# Patient Record
Sex: FEMALE | Race: BLACK OR AFRICAN AMERICAN | Employment: UNEMPLOYED | URBAN - METROPOLITAN AREA
[De-identification: names, ages, dates, MRNs, and addresses within clinical notes are randomized per-mention and may not be internally consistent; named-entity substitution may affect disease eponyms.]

---

## 2017-03-06 ENCOUNTER — HOSPITAL ENCOUNTER (OUTPATIENT)
Age: 67
Setting detail: OBSERVATION
Discharge: HOME | End: 2017-03-07
Attending: EMERGENCY MEDICINE | Admitting: INTERNAL MEDICINE
Payer: MEDICARE

## 2017-03-06 ENCOUNTER — APPOINTMENT (OUTPATIENT)
Dept: CT IMAGING | Age: 67
End: 2017-03-06
Attending: EMERGENCY MEDICINE
Payer: MEDICARE

## 2017-03-06 DIAGNOSIS — K92.2 GASTROINTESTINAL HEMORRHAGE, UNSPECIFIED GASTROINTESTINAL HEMORRHAGE TYPE: Primary | ICD-10-CM

## 2017-03-06 LAB
ALBUMIN SERPL BCP-MCNC: 2.9 G/DL (ref 3.5–5)
ALBUMIN/GLOB SERPL: 0.7 {RATIO} (ref 1.1–2.2)
ALP SERPL-CCNC: 95 U/L (ref 45–117)
ALT SERPL-CCNC: 28 U/L (ref 12–78)
ANION GAP BLD CALC-SCNC: 6 MMOL/L (ref 5–15)
APPEARANCE UR: CLEAR
AST SERPL W P-5'-P-CCNC: 32 U/L (ref 15–37)
ATRIAL RATE: 81 BPM
BACTERIA URNS QL MICRO: NEGATIVE /HPF
BASOPHILS # BLD AUTO: 0 K/UL (ref 0–0.1)
BASOPHILS # BLD: 1 % (ref 0–1)
BILIRUB SERPL-MCNC: 0.5 MG/DL (ref 0.2–1)
BILIRUB UR QL: NEGATIVE
BUN SERPL-MCNC: 9 MG/DL (ref 6–20)
BUN/CREAT SERPL: 15 (ref 12–20)
CALCIUM SERPL-MCNC: 8.5 MG/DL (ref 8.5–10.1)
CALCULATED P AXIS, ECG09: 41 DEGREES
CALCULATED R AXIS, ECG10: -12 DEGREES
CALCULATED T AXIS, ECG11: 84 DEGREES
CHLORIDE SERPL-SCNC: 100 MMOL/L (ref 97–108)
CO2 SERPL-SCNC: 30 MMOL/L (ref 21–32)
COLOR UR: ABNORMAL
CREAT SERPL-MCNC: 0.6 MG/DL (ref 0.55–1.02)
DIAGNOSIS, 93000: NORMAL
EOSINOPHIL # BLD: 0.1 K/UL (ref 0–0.4)
EOSINOPHIL NFR BLD: 1 % (ref 0–7)
EPITH CASTS URNS QL MICRO: ABNORMAL /LPF
ERYTHROCYTE [DISTWIDTH] IN BLOOD BY AUTOMATED COUNT: 12.5 % (ref 11.5–14.5)
EST. AVERAGE GLUCOSE BLD GHB EST-MCNC: 160 MG/DL
GLOBULIN SER CALC-MCNC: 4 G/DL (ref 2–4)
GLUCOSE BLD STRIP.AUTO-MCNC: 101 MG/DL (ref 65–100)
GLUCOSE BLD STRIP.AUTO-MCNC: 102 MG/DL (ref 65–100)
GLUCOSE BLD STRIP.AUTO-MCNC: 88 MG/DL (ref 65–100)
GLUCOSE SERPL-MCNC: 168 MG/DL (ref 65–100)
GLUCOSE UR STRIP.AUTO-MCNC: NEGATIVE MG/DL
HBA1C MFR BLD: 7.2 % (ref 4.2–6.3)
HCT VFR BLD AUTO: 36 % (ref 35–47)
HCT VFR BLD AUTO: 39.9 % (ref 35–47)
HEMOCCULT STL QL: POSITIVE
HGB BLD-MCNC: 11.8 G/DL (ref 11.5–16)
HGB BLD-MCNC: 13.2 G/DL (ref 11.5–16)
HGB UR QL STRIP: ABNORMAL
HYALINE CASTS URNS QL MICRO: ABNORMAL /LPF (ref 0–5)
KETONES UR QL STRIP.AUTO: NEGATIVE MG/DL
LEUKOCYTE ESTERASE UR QL STRIP.AUTO: NEGATIVE
LYMPHOCYTES # BLD AUTO: 13 % (ref 12–49)
LYMPHOCYTES # BLD: 0.9 K/UL (ref 0.8–3.5)
MCH RBC QN AUTO: 33 PG (ref 26–34)
MCHC RBC AUTO-ENTMCNC: 33.1 G/DL (ref 30–36.5)
MCV RBC AUTO: 99.8 FL (ref 80–99)
MONOCYTES # BLD: 0.4 K/UL (ref 0–1)
MONOCYTES NFR BLD AUTO: 7 % (ref 5–13)
NEUTS SEG # BLD: 5.1 K/UL (ref 1.8–8)
NEUTS SEG NFR BLD AUTO: 78 % (ref 32–75)
NITRITE UR QL STRIP.AUTO: NEGATIVE
P-R INTERVAL, ECG05: 180 MS
PH UR STRIP: 7 [PH] (ref 5–8)
PLATELET # BLD AUTO: 201 K/UL (ref 150–400)
POTASSIUM SERPL-SCNC: 3.5 MMOL/L (ref 3.5–5.1)
PROT SERPL-MCNC: 6.9 G/DL (ref 6.4–8.2)
PROT UR STRIP-MCNC: NEGATIVE MG/DL
Q-T INTERVAL, ECG07: 430 MS
QRS DURATION, ECG06: 78 MS
QTC CALCULATION (BEZET), ECG08: 499 MS
RBC # BLD AUTO: 4 M/UL (ref 3.8–5.2)
RBC #/AREA URNS HPF: ABNORMAL /HPF (ref 0–5)
SERVICE CMNT-IMP: ABNORMAL
SERVICE CMNT-IMP: ABNORMAL
SERVICE CMNT-IMP: NORMAL
SODIUM SERPL-SCNC: 136 MMOL/L (ref 136–145)
SP GR UR REFRACTOMETRY: 1.03 (ref 1–1.03)
UA: UC IF INDICATED,UAUC: ABNORMAL
UROBILINOGEN UR QL STRIP.AUTO: 0.2 EU/DL (ref 0.2–1)
VENTRICULAR RATE, ECG03: 81 BPM
WBC # BLD AUTO: 6.5 K/UL (ref 3.6–11)
WBC URNS QL MICRO: ABNORMAL /HPF (ref 0–4)

## 2017-03-06 PROCEDURE — 96375 TX/PRO/DX INJ NEW DRUG ADDON: CPT

## 2017-03-06 PROCEDURE — 74011250637 HC RX REV CODE- 250/637: Performed by: INTERNAL MEDICINE

## 2017-03-06 PROCEDURE — 96374 THER/PROPH/DIAG INJ IV PUSH: CPT

## 2017-03-06 PROCEDURE — 36415 COLL VENOUS BLD VENIPUNCTURE: CPT | Performed by: INTERNAL MEDICINE

## 2017-03-06 PROCEDURE — 82962 GLUCOSE BLOOD TEST: CPT

## 2017-03-06 PROCEDURE — 74011000250 HC RX REV CODE- 250: Performed by: FAMILY MEDICINE

## 2017-03-06 PROCEDURE — 74011000250 HC RX REV CODE- 250: Performed by: INTERNAL MEDICINE

## 2017-03-06 PROCEDURE — 65660000000 HC RM CCU STEPDOWN

## 2017-03-06 PROCEDURE — 74177 CT ABD & PELVIS W/CONTRAST: CPT

## 2017-03-06 PROCEDURE — 74011636320 HC RX REV CODE- 636/320: Performed by: EMERGENCY MEDICINE

## 2017-03-06 PROCEDURE — 74011250636 HC RX REV CODE- 250/636: Performed by: FAMILY MEDICINE

## 2017-03-06 PROCEDURE — 74011000258 HC RX REV CODE- 258: Performed by: EMERGENCY MEDICINE

## 2017-03-06 PROCEDURE — 80053 COMPREHEN METABOLIC PANEL: CPT | Performed by: EMERGENCY MEDICINE

## 2017-03-06 PROCEDURE — 94640 AIRWAY INHALATION TREATMENT: CPT

## 2017-03-06 PROCEDURE — C9113 INJ PANTOPRAZOLE SODIUM, VIA: HCPCS | Performed by: FAMILY MEDICINE

## 2017-03-06 PROCEDURE — 81001 URINALYSIS AUTO W/SCOPE: CPT | Performed by: INTERNAL MEDICINE

## 2017-03-06 PROCEDURE — 93041 RHYTHM ECG TRACING: CPT

## 2017-03-06 PROCEDURE — 96361 HYDRATE IV INFUSION ADD-ON: CPT

## 2017-03-06 PROCEDURE — 85025 COMPLETE CBC W/AUTO DIFF WBC: CPT | Performed by: EMERGENCY MEDICINE

## 2017-03-06 PROCEDURE — 85018 HEMOGLOBIN: CPT | Performed by: INTERNAL MEDICINE

## 2017-03-06 PROCEDURE — 77030029684 HC NEB SM VOL KT MONA -A

## 2017-03-06 PROCEDURE — 65390000012 HC CONDITION CODE 44 OBSERVATION

## 2017-03-06 PROCEDURE — 99285 EMERGENCY DEPT VISIT HI MDM: CPT

## 2017-03-06 PROCEDURE — 82272 OCCULT BLD FECES 1-3 TESTS: CPT | Performed by: EMERGENCY MEDICINE

## 2017-03-06 PROCEDURE — 74011250636 HC RX REV CODE- 250/636: Performed by: EMERGENCY MEDICINE

## 2017-03-06 PROCEDURE — 83036 HEMOGLOBIN GLYCOSYLATED A1C: CPT | Performed by: INTERNAL MEDICINE

## 2017-03-06 RX ORDER — MAGNESIUM SULFATE 100 %
4 CRYSTALS MISCELLANEOUS AS NEEDED
Status: DISCONTINUED | OUTPATIENT
Start: 2017-03-06 | End: 2017-03-07 | Stop reason: HOSPADM

## 2017-03-06 RX ORDER — SODIUM CHLORIDE 0.9 % (FLUSH) 0.9 %
5-10 SYRINGE (ML) INJECTION EVERY 8 HOURS
Status: DISCONTINUED | OUTPATIENT
Start: 2017-03-06 | End: 2017-03-07 | Stop reason: HOSPADM

## 2017-03-06 RX ORDER — ARFORMOTEROL TARTRATE 15 UG/2ML
15 SOLUTION RESPIRATORY (INHALATION)
Status: DISCONTINUED | OUTPATIENT
Start: 2017-03-06 | End: 2017-03-07 | Stop reason: HOSPADM

## 2017-03-06 RX ORDER — METFORMIN HYDROCHLORIDE 1000 MG/1
1000 TABLET ORAL 2 TIMES DAILY WITH MEALS
COMMUNITY

## 2017-03-06 RX ORDER — OMEPRAZOLE 20 MG/1
20 CAPSULE, DELAYED RELEASE ORAL DAILY
COMMUNITY
End: 2020-04-03

## 2017-03-06 RX ORDER — SODIUM CHLORIDE 9 MG/ML
125 INJECTION, SOLUTION INTRAVENOUS CONTINUOUS
Status: DISCONTINUED | OUTPATIENT
Start: 2017-03-06 | End: 2017-03-07

## 2017-03-06 RX ORDER — INSULIN LISPRO 100 [IU]/ML
INJECTION, SOLUTION INTRAVENOUS; SUBCUTANEOUS
Status: DISCONTINUED | OUTPATIENT
Start: 2017-03-06 | End: 2017-03-07 | Stop reason: HOSPADM

## 2017-03-06 RX ORDER — CETIRIZINE HCL 10 MG
10 TABLET ORAL
COMMUNITY
End: 2020-04-03

## 2017-03-06 RX ORDER — OMEPRAZOLE 20 MG/1
20 CAPSULE, DELAYED RELEASE ORAL DAILY
Status: DISCONTINUED | OUTPATIENT
Start: 2017-03-07 | End: 2017-03-06 | Stop reason: CLARIF

## 2017-03-06 RX ORDER — INSULIN LISPRO 100 [IU]/ML
INJECTION, SOLUTION INTRAVENOUS; SUBCUTANEOUS EVERY 6 HOURS
Status: DISCONTINUED | OUTPATIENT
Start: 2017-03-06 | End: 2017-03-06

## 2017-03-06 RX ORDER — OXYCODONE HYDROCHLORIDE 5 MG/1
5 TABLET ORAL
COMMUNITY
End: 2018-02-12

## 2017-03-06 RX ORDER — ASPIRIN 325 MG
1 TABLET, DELAYED RELEASE (ENTERIC COATED) ORAL
COMMUNITY
End: 2019-04-01 | Stop reason: SDUPTHER

## 2017-03-06 RX ORDER — ONDANSETRON 4 MG/1
4 TABLET, ORALLY DISINTEGRATING ORAL
COMMUNITY
End: 2018-06-29 | Stop reason: SDUPTHER

## 2017-03-06 RX ORDER — CETIRIZINE HCL 10 MG
10 TABLET ORAL
Status: DISCONTINUED | OUTPATIENT
Start: 2017-03-06 | End: 2017-03-07 | Stop reason: HOSPADM

## 2017-03-06 RX ORDER — ALBUTEROL SULFATE 90 UG/1
2 AEROSOL, METERED RESPIRATORY (INHALATION)
Status: DISCONTINUED | OUTPATIENT
Start: 2017-03-06 | End: 2017-03-06 | Stop reason: CLARIF

## 2017-03-06 RX ORDER — CALCIUM CARBONATE 200(500)MG
1 TABLET,CHEWABLE ORAL DAILY
COMMUNITY
End: 2018-02-16

## 2017-03-06 RX ORDER — LOSARTAN POTASSIUM 25 MG/1
25 TABLET ORAL DAILY
Status: DISCONTINUED | OUTPATIENT
Start: 2017-03-07 | End: 2017-03-07 | Stop reason: HOSPADM

## 2017-03-06 RX ORDER — LOSARTAN POTASSIUM 25 MG/1
25 TABLET ORAL DAILY
COMMUNITY
End: 2018-06-29 | Stop reason: SDUPTHER

## 2017-03-06 RX ORDER — MORPHINE SULFATE 2 MG/ML
4 INJECTION, SOLUTION INTRAMUSCULAR; INTRAVENOUS
Status: COMPLETED | OUTPATIENT
Start: 2017-03-06 | End: 2017-03-06

## 2017-03-06 RX ORDER — RISPERIDONE 1 MG/1
0.5 TABLET, FILM COATED ORAL DAILY
Status: DISCONTINUED | OUTPATIENT
Start: 2017-03-07 | End: 2017-03-07 | Stop reason: HOSPADM

## 2017-03-06 RX ORDER — METFORMIN HYDROCHLORIDE 500 MG/1
1000 TABLET ORAL 2 TIMES DAILY WITH MEALS
Status: DISCONTINUED | OUTPATIENT
Start: 2017-03-06 | End: 2017-03-06

## 2017-03-06 RX ORDER — ALBUTEROL SULFATE 0.83 MG/ML
2.5 SOLUTION RESPIRATORY (INHALATION)
Status: DISCONTINUED | OUTPATIENT
Start: 2017-03-06 | End: 2017-03-07 | Stop reason: HOSPADM

## 2017-03-06 RX ORDER — SODIUM CHLORIDE 0.9 % (FLUSH) 0.9 %
5-10 SYRINGE (ML) INJECTION AS NEEDED
Status: DISCONTINUED | OUTPATIENT
Start: 2017-03-06 | End: 2017-03-07 | Stop reason: HOSPADM

## 2017-03-06 RX ORDER — POLYETHYLENE GLYCOL 3350 17 G/17G
17 POWDER, FOR SOLUTION ORAL DAILY
Status: DISCONTINUED | OUTPATIENT
Start: 2017-03-06 | End: 2017-03-07 | Stop reason: HOSPADM

## 2017-03-06 RX ORDER — SODIUM CHLORIDE 0.9 % (FLUSH) 0.9 %
10 SYRINGE (ML) INJECTION
Status: COMPLETED | OUTPATIENT
Start: 2017-03-06 | End: 2017-03-06

## 2017-03-06 RX ORDER — METOPROLOL TARTRATE 25 MG/1
25 TABLET, FILM COATED ORAL DAILY
Status: DISCONTINUED | OUTPATIENT
Start: 2017-03-07 | End: 2017-03-07 | Stop reason: HOSPADM

## 2017-03-06 RX ORDER — ONDANSETRON 4 MG/1
4 TABLET, ORALLY DISINTEGRATING ORAL
Status: DISCONTINUED | OUTPATIENT
Start: 2017-03-06 | End: 2017-03-07 | Stop reason: HOSPADM

## 2017-03-06 RX ORDER — PANTOPRAZOLE SODIUM 40 MG/1
40 TABLET, DELAYED RELEASE ORAL
Status: DISCONTINUED | OUTPATIENT
Start: 2017-03-07 | End: 2017-03-07 | Stop reason: HOSPADM

## 2017-03-06 RX ORDER — DEXTROSE 50 % IN WATER (D50W) INTRAVENOUS SYRINGE
12.5-25 AS NEEDED
Status: DISCONTINUED | OUTPATIENT
Start: 2017-03-06 | End: 2017-03-07 | Stop reason: HOSPADM

## 2017-03-06 RX ORDER — OXYCODONE HYDROCHLORIDE 5 MG/1
5 TABLET ORAL
Status: DISCONTINUED | OUTPATIENT
Start: 2017-03-06 | End: 2017-03-07 | Stop reason: HOSPADM

## 2017-03-06 RX ORDER — BUDESONIDE 0.5 MG/2ML
500 INHALANT ORAL
Status: DISCONTINUED | OUTPATIENT
Start: 2017-03-06 | End: 2017-03-07 | Stop reason: HOSPADM

## 2017-03-06 RX ORDER — DEXTROMETHORPHAN POLISTIREX 30 MG/5 ML
SUSPENSION, EXTENDED RELEASE 12 HR ORAL AS NEEDED
Status: DISCONTINUED | OUTPATIENT
Start: 2017-03-06 | End: 2017-03-07 | Stop reason: HOSPADM

## 2017-03-06 RX ORDER — ONDANSETRON 2 MG/ML
8 INJECTION INTRAMUSCULAR; INTRAVENOUS
Status: COMPLETED | OUTPATIENT
Start: 2017-03-06 | End: 2017-03-06

## 2017-03-06 RX ORDER — NAPROXEN 500 MG/1
500 TABLET ORAL
COMMUNITY
End: 2018-02-12

## 2017-03-06 RX ADMIN — ARFORMOTEROL TARTRATE 15 MCG: 15 SOLUTION RESPIRATORY (INHALATION) at 19:28

## 2017-03-06 RX ADMIN — SODIUM CHLORIDE 125 ML/HR: 900 INJECTION, SOLUTION INTRAVENOUS at 16:55

## 2017-03-06 RX ADMIN — SODIUM CHLORIDE 125 ML/HR: 900 INJECTION, SOLUTION INTRAVENOUS at 08:48

## 2017-03-06 RX ADMIN — SODIUM CHLORIDE 40 MG: 9 INJECTION INTRAMUSCULAR; INTRAVENOUS; SUBCUTANEOUS at 09:16

## 2017-03-06 RX ADMIN — SODIUM CHLORIDE 100 ML: 900 INJECTION, SOLUTION INTRAVENOUS at 04:18

## 2017-03-06 RX ADMIN — ONDANSETRON 8 MG: 2 INJECTION INTRAMUSCULAR; INTRAVENOUS at 03:41

## 2017-03-06 RX ADMIN — IOPAMIDOL 100 ML: 755 INJECTION, SOLUTION INTRAVENOUS at 04:18

## 2017-03-06 RX ADMIN — SODIUM CHLORIDE 40 MG: 9 INJECTION INTRAMUSCULAR; INTRAVENOUS; SUBCUTANEOUS at 18:44

## 2017-03-06 RX ADMIN — BUDESONIDE 500 MCG: 0.5 INHALANT RESPIRATORY (INHALATION) at 19:28

## 2017-03-06 RX ADMIN — POLYETHYLENE GLYCOL 3350 17 G: 17 POWDER, FOR SOLUTION ORAL at 17:06

## 2017-03-06 RX ADMIN — OXYCODONE HYDROCHLORIDE 5 MG: 5 TABLET ORAL at 20:31

## 2017-03-06 RX ADMIN — Medication 10 ML: at 08:47

## 2017-03-06 RX ADMIN — Medication 4 MG: at 03:41

## 2017-03-06 RX ADMIN — SODIUM CHLORIDE 1000 ML: 900 INJECTION, SOLUTION INTRAVENOUS at 03:41

## 2017-03-06 RX ADMIN — Medication 10 ML: at 04:18

## 2017-03-06 RX ADMIN — Medication 10 ML: at 23:07

## 2017-03-06 RX ADMIN — CETIRIZINE HYDROCHLORIDE 10 MG: 10 TABLET, FILM COATED ORAL at 23:07

## 2017-03-06 NOTE — PROGRESS NOTES
Pt arrived to the unit. No signs of distress. Pt ambulatory to BR with steady gait. Pt placed on telemetry, IV fluids started, and VSS.    0915 - Admission database completed. Consult has been called into GI, Dr. Lizzette Cruz on call.

## 2017-03-06 NOTE — PROGRESS NOTES
Hospitalist Progress Note          Cassie Lara M.D. Josey: [phone]  Call physician on-call through the  7pm-7am        Date of visit:  3/6/2017    Admission Summary:   A 62-year-old    female with past medical history of anxiety, asthma, COPD, type 2   diabetes mellitus, dyspepsia, hemorrhoids and hypertension, who   presented to the emergency department via EMS from home with   multiple complaints, including lower back pain, pelvic pain, left   lower quadrant abdominal pain, and BRBPR. Patient was admitted for further work-up. PE:     03/06/17 1515 98.3 °F (36.8 °C) 77 122/80 -- At rest 16 100 %      Patient in no acute resp distress  AAOx3  Lungs CTA B  RRR, S1, S2, no tachycardia  Abdomen soft, non-tender, BS + hypoactive throughout  Extr: no edema    A/P:  LLQ abdominal pain with BRBPR:   - CT abdomen/ pelvis 3/6: There is a large amount of rectal stool. There is no bowel obstruction. There is sigmoid diverticulosis without diverticulitis. No other acute abnormality in the abdomen or pelvis. - suspect the BRBPR is hemorrhoidal, possibly due to diverticulosis;   - Patient had a large BM this afternoon, but still feels like she has not gone completely;  - add MiraLax and PRN enema;  - GI consult pending;  - will start a clear liquid diet;  - Discussed the results of CT with the patient;   - discussed the need to increase fiber intake and the importance to stay well hydrated, especially in the context of long term use of Oxycodone;   - patient reports poor appetite and poor PO Intake;   - monitor H&H;   - Patient likely needs a colonoscopy and possibly EGD, but if the H&H stays stable, this can possibly be done as outpatient;   - patient may also need a gastric emptying study since she reports a lot of vomiting after she eats, lack of appetite and progressive weight loss. Discussed with patient and nurse.       Cassie Lara MD

## 2017-03-06 NOTE — ROUTINE PROCESS
TRANSFER - OUT REPORT:    Verbal report given to obs rn(name) on Kristal Segundo  being transferred to OBS(unit) for routine progression of care       Report consisted of patients Situation, Background, Assessment and   Recommendations(SBAR). Information from the following report(s) SBAR, Kardex, ED Summary and MAR was reviewed with the receiving nurse. Lines:   Peripheral IV 03/06/17 Left Forearm (Active)   Site Assessment Clean, dry, & intact 3/6/2017  3:17 AM   Phlebitis Assessment 0 3/6/2017  3:17 AM   Infiltration Assessment 0 3/6/2017  3:17 AM   Dressing Status Clean, dry, & intact 3/6/2017  3:17 AM        Opportunity for questions and clarification was provided.       Patient transported with:

## 2017-03-06 NOTE — PROGRESS NOTES
Noted consult for possible placement at [facility]. Received call from nurse who has spoken with pt and the pt is independent in self care is just interested in potentially considering moving out of her home and into a senior community of some type. Will send referral to [facility] to determine if they can do home visit after discharge to assist.  Added information to AVS.    Will be available to assist as needed.     DILLAN Tabares

## 2017-03-06 NOTE — ED PROVIDER NOTES
HPI Comments: 61 y.o. female with past medical history significant for COPD, HTN, DM, Asthma, Hemorrhoids, Anxiety who presents from home via EMS with multiple complaints. Pt reports an acute onset of left low back pain x yesterday. Pt describes symptoms as radiating pain down left leg with intermittent \"giving out\" feeling. Pt denies falling. Pt also c/o anal bleeding with LLQ pain and urinary urgency with decreased urination. Pain is non-radiating and described as \"feels like there is pain pushing down into my vagina\". She notes hx of hemorrhoids but denies hx of similar symptoms. Pt denies acute symptoms of cough, congestion, fever, chills, chest pain, SOB, syncope, dizziness, lightheadedness, headache or dysuria. There are no other acute medical concerns at this time. Social hx: + Smoker. + Occasional EtOH use. PCP: No primary care provider on file. Note written by Dea Argueta, as dictated by Michael Vidal MD 3:35 AM    The history is provided by the patient. No  was used. Past Medical History:   Diagnosis Date    Anxiety     Asthma     COPD     Diabetes ([facility] [address]     Dyspepsia and other specified disorders of function of stomach     Hemorrhoids     Hypertension        Past Surgical History:   Procedure Laterality Date    HX GYN      Thermal Ablation    HX HEART CATHETERIZATION      exploration    HX LAPAROTOMY      HX ORTHOPAEDIC      right knee    HX OTHER SURGICAL      Colonoscopy    HX TUBAL LIGATION           Family History:   Problem Relation Age of Onset    Heart Disease Father     Cancer Sister        Social History     Social History    Marital status:      Spouse name: N/A    Number of children: N/A    Years of education: N/A     Occupational History    Not on file.      Social History Main Topics    Smoking status: Current Every Day Smoker     Years: 38.00    Smokeless tobacco: Never Used    Alcohol use 1.0 oz/week     2 Glasses of wine per week      Comment: occasionally    Drug use: No    Sexual activity: Not on file     Other Topics Concern    Not on file     Social History Narrative         ALLERGIES: Tylenol-codeine #3 [acetaminophen-codeine]    Review of Systems   Constitutional: Negative for fever. HENT: Negative for congestion. Respiratory: Negative for cough and shortness of breath. Cardiovascular: Negative for chest pain. Gastrointestinal: Positive for abdominal pain (LLQ) and anal bleeding. Negative for constipation, diarrhea, nausea and vomiting. Genitourinary: Positive for decreased urine volume and urgency. Negative for dysuria. Musculoskeletal: Positive for back pain (low left, radiating down left leg). Negative for neck pain. Neurological: Negative for dizziness, syncope, light-headedness and headaches. All other systems reviewed and are negative. Vitals:    03/06/17 0300   BP: (!) 146/94   Pulse: 92   Resp: 18   Temp: 99.4 °F (37.4 °C)   SpO2: 99%            Physical Exam   Constitutional: She is oriented to person, place, and time. She appears well-developed and well-nourished. HENT:   Head: Normocephalic and atraumatic. Nose: Nose normal.   Eyes: Conjunctivae and EOM are normal. Pupils are equal, round, and reactive to light. Neck: Normal range of motion. Neck supple. Cardiovascular: Normal rate, regular rhythm, normal heart sounds and intact distal pulses. Pulmonary/Chest: Effort normal and breath sounds normal.   Abdominal: Soft. Bowel sounds are normal. There is tenderness in the left lower quadrant. Genitourinary:   Genitourinary Comments: Brown to maroon stool on rectal exam   Musculoskeletal: Normal range of motion. Left lower lumbar paraspinal discomfort to palpation   Neurological: She is oriented to person, place, and time. She has normal reflexes. Skin: Skin is warm and dry. Psychiatric: She has a normal mood and affect.  Her behavior is normal.   Nursing note and vitals reviewed. Note written by Dea Ashton, as dictated by Roseann Bassett MD 3:49 AM    [facility]  ED Course       Procedures      Heme positive reddish stool. HBG ok. CT negative acute process. WIll admit for further work up.

## 2017-03-06 NOTE — ED TRIAGE NOTES
Patient reports \"blood from my rectum since this evening. \"  She also reports left back pain sine yesterday.

## 2017-03-06 NOTE — PROGRESS NOTES
Admission Medication Reconciliation:    Information obtained from: Patient and Rx Query    Significant PMH/Disease States:   Past Medical History:   Diagnosis Date    Anxiety     Asthma     COPD     Diabetes ([address]     Dyspepsia and other specified disorders of function of stomach     Hemorrhoids     Hypertension        Chief Complaint for this Admission:    Chief Complaint   Patient presents with    Rectal Bleeding       Allergies:  Tylenol-codeine #3 [acetaminophen-codeine]    Prior to Admission Medications:   Prior to Admission Medications   Prescriptions Last Dose Informant Patient Reported? Taking? ALPRAZolam (XANAX) 1 mg tablet   Yes Yes   Sig: Take 1 mg by mouth three (3) times daily as needed. Cholecalciferol, Vitamin D3, 50,000 unit cap 3/2/2017  Yes Yes   Sig: Take 1 Cap by mouth Every Thursday. PROAIR HFA 90 mcg/actuation inhaler   Yes Yes   Sig: Take 2 Puffs by inhalation every four (4) hours as needed for Shortness of Breath. Usually uses at least once daily. SYMBICORT 80-4.5 mcg/actuation HFAA inhaler 3/5/2017 at Unknown time  Yes Yes   Sig: Take 2 Puffs by inhalation two (2) times a day. calcium carbonate (TUMS) 200 mg calcium (500 mg) chew 3/4/2017  Yes Yes   Sig: Take 1 Tab by mouth daily. cetirizine (ZYRTEC) 10 mg tablet 3/4/2017  Yes Yes   Sig: Take 10 mg by mouth nightly. insulin NPH (NOVOLIN N) 100 unit/mL injection 3/5/2017 at Unknown time  Yes Yes   Si Units by SubCUTAneous route Before breakfast and dinner. losartan (COZAAR) 25 mg tablet   Yes No   Sig: Take 25 mg by mouth daily. Patient didn't recognize medication name - Rx Query states filled  #30 for 30 day supply   metFORMIN (GLUCOPHAGE) 1,000 mg tablet   Yes No   Sig: Take 1,000 mg by mouth two (2) times daily (with meals).  Patient unsure of dose or frequency - Rx Query states filled  #60 for 30 day supply   metoprolol (LOPRESSOR) 25 mg tablet 3/4/2017 at Unknown time  Yes Yes   Sig: Take 25 mg by mouth daily. naproxen (NAPROSYN) 500 mg tablet   Yes Yes   Sig: Take 500 mg by mouth two (2) times daily as needed. omeprazole (PRILOSEC) 20 mg capsule 3/4/2017  Yes Yes   Sig: Take 20 mg by mouth daily. ondansetron (ZOFRAN ODT) 4 mg disintegrating tablet   Yes Yes   Sig: Take 4 mg by mouth every six (6) hours as needed for Nausea. oxyCODONE IR (ROXICODONE) 5 mg immediate release tablet   Yes Yes   Sig: Take 5 mg by mouth every four (4) hours as needed for Pain. Patient usually takes at least twice daily. risperiDONE (RISPERDAL) 0.5 mg tablet 3/5/2017 at Unknown time  Yes Yes   Sig: Take 0.5 mg by mouth daily. Facility-Administered Medications: None         Comments/Recommendations:     Spoke with patient regarding allergies and PTA medications. Patient states she uses a pill box and does not remember all of her medications. Supplemented information from interview with information listed in Rx Query. 1) Reviewed and confirmed allergy. Patient states she cannot tolerate APAP. 2) Updated PTA medication list. Added cetirizine, oxycodone, naproxen, calcium, vitamin D, ondansetron, omeprazole, metformin (patient recognized drug name, but not strength/frequency), and losartan (patient did not recognize). Modified alprazolam (TID prn), metoprolol (daily), Novolin, albuterol (prn), risperidone (daily), and symbicort (BID). Removed ibuprofen x 2, Percocet x 2, and zolpidem (patient states she no longer takes). Regarding patient's antihistamine (cetirizine), Rx Query also shows a recent fill for loratadine 3/2 for 90 day supply. Patient did not recognize loratadine, but did recognize cetirizine. Regarding metformin, patient states she recognizes the drug name however is unsure of dose/frequency. Rx Query shows fill date of 2/18 for 1000 mg tablets #60 for 30 day supply. Regarding losartan, patient did not recognize drug name however she states she recently was prescribed some new medications. Rx Query shows fill date of 2/18 for 25 mg tablets #30 for 30 day supply. Patient also states she takes another \"yellow pill\" at bedtime, however she is unsure the name of the medication.       Mikael Chang, PamelaD

## 2017-03-06 NOTE — H&P
[address] [facility] [address]   HISTORY AND PHYSICAL       Name:  Kalpesh Mcallister   MR#:  [id number]   :  1950   Account #:  [de-identified]        Date of Adm:  2017       CHIEF COMPLAINT: Pelvic pressure and back pain. HISTORY OF PRESENT ILLNESS: A 30-year-old    female with past medical history of anxiety, asthma, COPD, type 2   diabetes mellitus, dyspepsia, hemorrhoids and hypertension, who   presented to the emergency department via EMS from home with   multiple complaints, including lower back pain, pelvic pain and left   lower quadrant abdominal pain. Symptoms notably have been chronic,   but notably have worsened, with pain in the left lower quadrant. She   also now complains of having rectal bleeding, with dark-colored stool. She also has some complaints of pain that was radiating into her left   leg. There are no complaints of recent trauma, injuries or falls. She   does not report any strenuous activity. Notably, the pain lower   quadrant has been radiating to the pelvis. She does not complain of   any shortness of breath, chest pain, dizziness, lightheadedness, focal   weakness, numbness, paresthesias, slurred speech, facial droop,   syncope or loss of consciousness. She does not complain of any   dysuria or hematuria. On arrival in the emergency department, initial   recorded vital signs were blood pressure 146/94, heart rate 92,   respiratory rate 18, O2 saturation 99% on room air. Workup included   CT abdomen and pelvis with IV contrast showed a large amount of   rectal stool, with diverticulosis without diverticulitis. The patient was   Hemoccult positive. The patient is now seen for admission to the   hospitalist service for noted diagnosis of GI bleed. PAST MEDICAL HISTORY   1. Anxiety. 2. Asthma. 3. COPD. 4. Type 2 diabetes mellitus. 5. Dyspepsia. 6. Hemorrhoids. 7. Hypertension.     PAST SURGICAL HISTORY    1. Cardiac catheterization. 2. Laparotomy. 3.  Right knee surgery (unspecified)    4. Colonoscopy. 5. Tubal ligation. 6. Endometrial thermal ablation. MEDICATIONS   1. Xanax 1 mg p.o. daily p.r.n.   2. Ibuprofen 600 mg p.o. q.8 h. p.r.n.   3. Ativan 0.5 mg p.o. p.r.n.   4. Metoprolol 25 mg p.o. b.i.d.   5. Percocet 5/325 mg 1 tablet p.o. q.4 h. p.r.n.   6. ProAir HFA 90 mcg per actuation inhaler p.r.n.   7. Risperdal 0.5 mg p.o. daily. 8. Symbicort 80/4.5 mcg per actuation HFA inhaler p.o. daily. 9. Ambien 10 mg p.o. daily p.r.n. ALLERGIES TO: TYLENOL #3. SOCIAL HISTORY: Positive for smoking cigarettes. Positive   occasional alcohol. Denies illicit drugs. FAMILY HISTORY: Heart disease, father. Unspecified cancer, sister. REVIEW OF SYSTEMS: Eleven systems reviewed, pertinent positives   as HPI, otherwise negative. PHYSICAL EXAMINATION   VITAL SIGNS: Temperature 99.4 degrees Fahrenheit, blood pressure   is 113/46, heart rate 87, respiratory rate 14, O2 saturation 98% on   room air. Recorded weight 132 pounds (59.9 kg), reported height of 5   feet 5 inches tall. GENERAL: The patient in no acute respiratory distress. PSYCHIATRIC: The patient is awake, alert, oriented x3, anxious. NEUROLOGIC: GCS of 15. Moves extremities x4. Sensation is grossly   intact, without slurred speech or facial droop. HENT: Normocephalic, atraumatic. PERRLA. EOMs intact. Sclerae   anicteric. Conjunctivae pink. Oropharynx clear. Tongue midline/ nonedematous. NECK: Supple without lymphadenopathy, carotid bruits or   thyromegaly. LYMPH: no palpable cervical or supraclavicular lymphadenopathy. RESPIRATORY: Lungs clear to auscultation bilaterally. HEART: Regular rate and rhythm, normal S1, S2, without murmurs,   rubs or gallops. GASTROINTESTINAL: Abdomen soft, nontender, nondistended. Normoactive bowel sounds. No rebound, guarding or rigidity.  No auscultated abdominal bruits or palpable abdominal mass. BACK: No CVA tenderness. No step-off deformity. MUSCULOSKELETAL: No acute palpable deformity. Negative for calf   tenderness. VASCULAR: 2+ radial, 1+ dorsalis pedis pulses, without cyanosis. Positive clubbing. Negative for edema. SKIN: Warm and dry. LABORATORY DATA: I reviewed as follows. Sodium 136, potassium   3.5, chloride 100, CO2 of 30, BUN of 9, creatinine 0.60, glucose 168,   anion gap of 6, calcium is 8.5, GFR greater than 60, total bilirubin 0.5,   total protein 6.9, albumin 3.9, ALT 28, AST of 32, alkaline phosphatase   is 95. WBC 6.5, hemoglobin 13.2, hematocrit 38.9, platelets 385,   neutrophils of 78%. Stool occult blood test positive. DIAGNOSTIC DATA: CT of the abdomen and pelvis with contrast,   results reviewed and noted in HPI. A 12-lead EKG, normal sinus   rhythm, nonspecific ST and T-wave changes at 81 beats per minute. IMPRESSION AND PLAN: A 62-year-old female with noted past   medical history, now admitted with gastrointestinal bleed. 1. Gastrointestinal bleed. Admit the patient to telemetry. The patient   will be n.p.o. on IV fluids. Consult with Gastroenterologist. Follow up   with the patient today. Place on Protonix. 2. Left lower quadrant abdominal/pelvic pain. Continue with pain   management and supportive care. 3. Lower back pain. May be associated with same. However, consider   also for possible radiculopathy or lumbar disk disease. 4. Anxiety. Continue to monitor. She notably has been on Ativan in the   past.   5. Difficulty walking, which the patient has been complaining of the   same, notably chronic. Placed on fall precautions. 6. Generalized weakness and debility. The patient notes she has not   been able to perform functions and activities of daily living as easily. She is interested in being seen by Case Management, considering for   possible assisted living. Consult with Case Management for possible   placement. 7. Type 2 diabetes mellitus. Placed on Humalog insulin correctional   coverage, scheduled Accu-Cheks. 8. Tobacco abuse. Strongly encouraged smoking cessation. Will order   nicotine patch. 9. Venous thromboembolism prophylaxis, sequential compression   devices to bilateral lower extremities. VANITA Varma MD MP / [facility]   D:  03/06/2017   08:34   T:  03/06/2017   13:18   Job #:  [id number]

## 2017-03-06 NOTE — PROGRESS NOTES
Pt c/o of mouth being dry and asking if she is going to have a procedure today. Re-educated that GI has to see her before a determination of a procedure. She is still to be NPO but spoke with Dr. Richi Christiansen and Gwendolyn Sifuentes for pt to have some ice chips. Called GI office to verify that consult was sent out and it was sent to Dr. Martha Franklin.

## 2017-03-06 NOTE — IP AVS SNAPSHOT
[address] 
[phone] Patient: Sofia Vergara MRN: [id number] OUR:[id number]/[id number] You are allergic to the following Allergen Reactions Tylenol-Codeine #3 (Acetaminophen-Codeine) Itching Patient states she cannot tolerate APAP Recent Documentation Height Weight BMI OB Status Smoking Status 1.651 m 59.9 kg 21.97 kg/m2 Postmenopausal Current Every Day Smoker Unresulted Labs Order Current Status SAMPLE TO BLOOD BANK In process Emergency Contacts Name Discharge Info Relation Home Work Mobile Shlomo Frank  Child [2]   [phone] Keli Spencer  Other Relative [6] [phone] About your hospitalization You were admitted on:  March 6, 2017 You last received care in the:  [facility] 5 OBSERVATION You were discharged on:  March 7, 2017 Unit phone number:  [phone] Why you were hospitalized Your primary diagnosis was:  Gi Bleed Providers Seen During Your Hospitalizations Provider Role Specialty Primary office phone Flex Glover MD Attending Provider Emergency Medicine [phone] Chelsey Osborne MD Attending Provider [facility] [phone] Barby Vance MD Attending Provider Internal Medicine [phone] Your Primary Care Physician (PCP) Primary Care Physician Office Phone Office Fax OTHER, PHYS ** None ** ** None ** Follow-up Information Follow up With Details Comments Contact Info [facility] On 3/6/2017 may be able to provide guideance regarding availability of senior living communities in and around [address] Rob Marroquin MD   Patient can only remember the practice name and not the physician 
  
 Nasir Henderson. Misael Tripathi MD Schedule an appointment as soon as possible for a visit in 2 weeks For colonoscopy  [address] [place] [address] [employer] [address] Gastrointestinal [address] [facility] [address] 
[phone] Current Discharge Medication List  
  
CONTINUE these medications which have NOT CHANGED Dose & Instructions Dispensing Information Comments Morning Noon Evening Bedtime ALPRAZolam 1 mg tablet Commonly known as:  Gurjit [place] Dose:  1 mg Take 1 mg by mouth three (3) times daily as needed. Refills:  2  
     
   
   
   
  
 calcium carbonate 200 mg calcium (500 mg) Chew Commonly known as:  TUMS Your next dose is:  Tomorrow Dose:  1 Tab Take 1 Tab by mouth daily. Refills:  0  
     
  
   
   
   
  
 cetirizine 10 mg tablet Commonly known as:  ZYRTEC Your next dose is: Today Dose:  10 mg Take 10 mg by mouth nightly. Refills:  0 Cholecalciferol (Vitamin D3) 50,000 unit Cap Dose:  1 Cap Take 1 Cap by mouth Every Thursday. Refills:  0  
     
   
   
   
  
 losartan 25 mg tablet Commonly known as:  COZAAR Your next dose is:  Tomorrow Dose:  25 mg Take 25 mg by mouth daily. Patient didn't recognize medication name - Rx Query states filled 2/18 #30 for 30 day supply Refills:  0  
     
  
   
   
   
  
 metFORMIN 1,000 mg tablet Commonly known as:  GLUCOPHAGE Your next dose is:  Tomorrow Dose:  1000 mg Take 1,000 mg by mouth two (2) times daily (with meals). Patient unsure of dose or frequency - Rx Query states filled 2/18 #60 for 30 day supply Refills:  0  
     
  
   
   
   
  
 metoprolol tartrate 25 mg tablet Commonly known as:  LOPRESSOR Your next dose is:  Tomorrow Dose:  25 mg Take 25 mg by mouth daily. Refills:  6  
     
  
   
   
   
  
 naproxen 500 mg tablet Commonly known as:  NAPROSYN Dose:  500 mg Take 500 mg by mouth two (2) times daily as needed. Refills:  0 NovoLIN N 100 unit/mL injection Generic drug:  insulin NPH Your next dose is: Today Dose:  30 Units 30 Units by SubCUTAneous route Before breakfast and dinner. Refills:  0  
     
   
   
  
   
  
 omeprazole 20 mg capsule Commonly known as:  PRILOSEC Your next dose is:  Tomorrow Dose:  20 mg Take 20 mg by mouth daily. Refills:  0  
     
  
   
   
   
  
 oxyCODONE IR 5 mg immediate release tablet Commonly known as:  [place] Dose:  5 mg Take 5 mg by mouth every four (4) hours as needed for Pain. Patient usually takes at least twice daily. Refills:  0 PROAIR HFA 90 mcg/actuation inhaler Generic drug:  albuterol Dose:  2 Puff Take 2 Puffs by inhalation every four (4) hours as needed for Shortness of Breath. Usually uses at least once daily. Refills:  0  
     
   
   
   
  
 risperiDONE 0.5 mg tablet Commonly known as:  RisperDAL Your next dose is:  Tomorrow Dose:  0.5 mg Take 0.5 mg by mouth daily. Refills:  2 SYMBICORT 80-4.5 mcg/actuation Hfaa inhaler Generic drug:  budesonide-formoterol Dose:  2 Puff Take 2 Puffs by inhalation two (2) times a day. Refills:  0 ZOFRAN ODT 4 mg disintegrating tablet Generic drug:  ondansetron Dose:  4 mg Take 4 mg by mouth every six (6) hours as needed for Nausea. Refills:  0 STOP taking these medications OTHER  
   
  
 oxyCODONE-acetaminophen 5-325 mg per tablet Commonly known as:  PERCOCET Discharge Instructions Discharge Instructions PATIENT ID: Andres Beaulieu MRN: [id number] YOB: 1950 DATE OF ADMISSION: 3/6/2017  2:53 AM   
DATE OF DISCHARGE: 3/7/2017 PRIMARY CARE PROVIDER: Rob Marroquin MD  
 
ATTENDING PHYSICIAN: Jair Esparza MD 
DISCHARGING PROVIDER: Jair Esparza MD   
 To contact this individual call [phone] and ask the  to page. If unavailable ask to be transferred the Adult Hospitalist Department. DISCHARGE DIAGNOSES Bright Red Blood Per Rectum suspect hemorrhoidal bleed CONSULTATIONS: IP CONSULT TO HOSPITALIST 
IP CONSULT TO GASTROENTEROLOGY PROCEDURES/SURGERIES: * No surgery found * PENDING TEST RESULTS:  
At the time of discharge the following test results are still pending: None FOLLOW UP APPOINTMENTS:  
Follow-up Information Follow up With Details Comments Contact [facility] [employer] [facility] Office on Aging On 3/6/2017 may be able to provide guideance regarding availability of senior living communities in and around [address] [place] Rob Marroquin MD   Patient can only remember the practice name and not the physician 
  
 Edita Deleon. Yolanda Ruiz MD Schedule an appointment as soon as possible for a visit in 2 weeks For colonoscopy  [address] [place] [address] [employer] [facility] Gastrointestinal [address] [place] [address] 
[phone] ADDITIONAL CARE RECOMMENDATIONS:  
DIVERTICULOSIS: PATIENT INSTRUCTIONS FOLLOW-UP:  Call your physician immediately if you have any fevers greater than 102.5,  increasing abdominal pain, or nausea/vomiting. CAUSE:  Some people may have congenital diverticulosis, but most people develop diverticulosis around or after age 36 due to a low-fiber, high-fat diet, along with inadequate fluid intake. This is very prevalent in the industrialized world where we eat a lot of processed, low fiber foods. Diverticuli develop due to firm stool and high pressures in the colon, along with secondary spasm, which causes outpouchings to occus where the small arteries penetrate the wall of the colon to feed the internal lining. These occur most commonly on the left side and lower portions of the colon.   [address] can then cause bleeding from the arteries at these sites of weakness when they rupture or the diveritculi can get blocked with stool and debris and become obstructed, causing diverticulosis, which is due to an infection. When these are infected, diverticulitis, it is often treated with antibiotics and bowel rest, but when severe, recurrent, or if rupture of the colon occurs, it may require surgery. Following appropriate dietary changes and taking the proper precautions is therefore very important. DIET:  You should increase your dietary fiber intake and take a fiber supplement twice a day. Make sure that you are taking a supplement that is just fiber and is not a laxative, which should be noted on the package. Starting a fiber supplement may cause increased gas, more frequent bowel movements, and distension at first but this should improve after a couple of weeks. Try to eat whole wheat breads and pasta, more fruits and vegetables, along with brown rice and plenty of fluids. Avoid small undigestible food items that could get stuck in these outpouchings, such as unpopped popcorn kernals, whole corn, small undigestible seeds, etc.. ACTIVITY:  Exercise is also a great way to prevent constipation and is encouraged. Always make sure you take in plenty of fluids when exercising. MEDICATIONS:  Take an over-the-counter fiber supplement as noted above twice daily. If your symptoms don't improve with fiber and dietary changes alone your physician may also recommend psyllium or methylcellulose as well. If your physician has placed you on an antibiotic it is critical that you take the full course of these, even if your symptoms have improved, and that you not miss any doses. QUESTIONS:  Please feel free to call your physician or the hospital  if you have any questions, and they will be glad to assist you. If you have further questions it may also be helpful to meet with a dietitician.  
 
 
 
DISCHARGE MEDICATIONS: 
 See Medication Reconciliation Form · It is important that you take the medication exactly as they are prescribed. · Keep your medication in the bottles provided by the pharmacist and keep a list of the medication names, dosages, and times to be taken in your wallet. · Do not take other medications without consulting your doctor. NOTIFY YOUR PHYSICIAN FOR ANY OF THE FOLLOWING:  
Fever over 101 degrees for 24 hours. Chest pain, shortness of breath, fever, chills, nausea, vomiting, diarrhea, change in mentation, falling, weakness, bleeding. Severe pain or pain not relieved by medications. Or, any other signs or symptoms that you may have questions about. DISPOSITION: 
 x Home With: 
 OT  PT  [facility]  RN  
  
 SNF/Inpatient Rehab/LTAC Independent/assisted living Hospice Other: CDMP Checked:  
Yes IK PROBLEM LIST Updated: 
Yes IK Signed:  
Vel Gonzalez MD 
3/7/2017 
2:10 PM 
 
 
 
 
  
Diverticulosis: Care Instructions Your Care Instructions In diverticulosis, pouches called diverticula form in the wall of the large intestine (colon). The pouches do not cause any pain or other symptoms. Most people who have diverticulosis do not know they have it. But the pouches sometimes bleed, and if they become infected, they can cause pain and other symptoms. When this happens, it is called diverticulitis. Diverticula form when pressure pushes the wall of the colon outward at certain weak points. A diet that is too low in fiber can cause diverticula. Follow-up care is a key part of your treatment and safety. Be sure to make and go to all appointments, and call your doctor if you are having problems. It's also a good idea to know your test results and keep a list of the medicines you take. How can you care for yourself at home? · Include fruits, leafy green vegetables, beans, and whole grains in your diet each day. These foods are high in fiber. · Take a fiber supplement, such as Citrucel or Metamucil, every day if needed. Read and follow all instructions on the label. · Drink plenty of fluids, enough so that your urine is light yellow or clear like water. If you have kidney, heart, or liver disease and have to limit fluids, talk with your doctor before you increase the amount of fluids you drink. · Get at least 30 minutes of exercise on most days of the week. Walking is a good choice. You also may want to do other activities, such as running, swimming, cycling, or playing tennis or team sports. · Cut out foods that cause gas, pain, or other symptoms. When should you call for help? Call your doctor now or seek immediate medical care if: 
· You have belly pain. · You pass maroon or very bloody stools. · You have a fever. · You have nausea and vomiting. · You have unusual changes in your bowel movements or abdominal swelling. · You have burning pain when you urinate. · You have abnormal vaginal discharge. · You have shoulder pain. · You have cramping pain that does not get better when you have a bowel movement or pass gas. · You pass gas or stool from your urethra while urinating. Watch closely for changes in your health, and be sure to contact your doctor if you have any problems. Where can you learn more? Go to http://shakir-ros.info/. Enter I036 in the search box to learn more about \"Diverticulosis: Care Instructions. \" Current as of: August 9, 2016 Content Version: 11.1 © 5598-8025 Healthwise, Incorporated. Care instructions adapted under license by [employer] (which disclaims liability or warranty for this information). If you have questions about a medical condition or this instruction, always ask your healthcare professional. [place] [address] any warranty or liability for your use of this information. Discharge Orders None [facility] Announcement We are excited to announce that we are making your provider's discharge notes available to you in [email or web address]. You will see these notes when they are completed and signed by the physician that discharged you from your recent hospital stay. If you have any questions or concerns about any information you see in [email or web address], please call the Health Information Department where you were seen or reach out to your Primary Care Provider for more information about your plan of care. Introducing [facility]! Aminah Land introduces [email or web address] patient portal. Now you can access parts of your medical record, email your doctor's office, and request medication refills online. 1. In your internet browser, go to [email or web address] 2. Click on the First Time User? Click Here link in the Sign In box. You will see the New Member Sign Up page. 3. Enter your [email or web address] Access Code exactly as it appears below. You will not need to use this code after youve completed the sign-up process. If you do not sign up before the expiration date, you must request a new code. · [email or web address] Access Code: [email or web address] Expires: 6/4/2017  3:52 AM 
 
4. Enter the last four digits of your Social Security Number (xxxx) and Date of Birth (mm/dd/yyyy) as indicated and click Submit. You will be taken to the next sign-up page. 5. Create a [email or web address] ID. This will be your [email or web address] login ID and cannot be changed, so think of one that is secure and easy to remember. 6. Create a [email or web address] password. You can change your password at any time. 7. Enter your Password Reset Question and Answer. This can be used at a later time if you forget your password. 8. Enter your e-mail address. You will receive e-mail notification when new information is available in [address] 9. Click Sign Up. You can now view and download portions of your medical record.  
10. Click the Download Summary menu link to download a portable copy of your medical information. If you have questions, please visit the Frequently Asked Questions section of the [employer] website. Remember, MyChart is NOT to be used for urgent needs. For medical emergencies, dial 911. Now available from your iPhone and Android! General Information Please provide this summary of care documentation to your next provider. Patient Signature:  ____________________________________________________________ Date:  ____________________________________________________________  
  
Suzon Mems Provider Signature:  ____________________________________________________________ Date:  ____________________________________________________________

## 2017-03-06 NOTE — PROGRESS NOTES
Pt up to BR with steady gait. Pt had a large BM. Pt states it was painful when she first went. There was a slight amount of blood with the stool, red. Pt also had some on the toilet paper, darker red. Pt states she does not feel as full but thinks there is more to come. Pt back to bed, no signs of distress. Will continue to monitor pt.

## 2017-03-06 NOTE — PROGRESS NOTES
TRANSFER - IN REPORT:    Verbal report [place] from [facility] [place] Chema Maradiaga  being received from ED (unit) for routine progression of care      Report consisted of patients Situation, Background, Assessment and   Recommendations(SBAR). Information from the following report(s) SBAR, Kardex, ED Summary, [facility] and Recent Results was reviewed with the receiving nurse. Opportunity for questions and clarification was provided. Assessment completed upon patients arrival to unit and care assumed.

## 2017-03-07 VITALS
HEIGHT: 65 IN | TEMPERATURE: 97.8 F | WEIGHT: 132 LBS | BODY MASS INDEX: 21.99 KG/M2 | HEART RATE: 60 BPM | DIASTOLIC BLOOD PRESSURE: 61 MMHG | RESPIRATION RATE: 16 BRPM | SYSTOLIC BLOOD PRESSURE: 97 MMHG | OXYGEN SATURATION: 96 %

## 2017-03-07 LAB
ANION GAP BLD CALC-SCNC: 8 MMOL/L (ref 5–15)
BASOPHILS # BLD AUTO: 0 K/UL (ref 0–0.1)
BASOPHILS # BLD: 1 % (ref 0–1)
BUN SERPL-MCNC: 3 MG/DL (ref 6–20)
BUN/CREAT SERPL: 6 (ref 12–20)
CALCIUM SERPL-MCNC: 7.3 MG/DL (ref 8.5–10.1)
CHLORIDE SERPL-SCNC: 110 MMOL/L (ref 97–108)
CO2 SERPL-SCNC: 24 MMOL/L (ref 21–32)
CREAT SERPL-MCNC: 0.47 MG/DL (ref 0.55–1.02)
EOSINOPHIL # BLD: 0.3 K/UL (ref 0–0.4)
EOSINOPHIL NFR BLD: 6 % (ref 0–7)
ERYTHROCYTE [DISTWIDTH] IN BLOOD BY AUTOMATED COUNT: 12.4 % (ref 11.5–14.5)
FOLATE SERPL-MCNC: 8.1 NG/ML (ref 5–21)
GLUCOSE BLD STRIP.AUTO-MCNC: 78 MG/DL (ref 65–100)
GLUCOSE BLD STRIP.AUTO-MCNC: 89 MG/DL (ref 65–100)
GLUCOSE BLD STRIP.AUTO-MCNC: 90 MG/DL (ref 65–100)
GLUCOSE SERPL-MCNC: 76 MG/DL (ref 65–100)
HCT VFR BLD AUTO: 31.3 % (ref 35–47)
HGB BLD-MCNC: 10.2 G/DL (ref 11.5–16)
INR PPP: 1.1 (ref 0.9–1.1)
IRON SATN MFR SERPL: 46 % (ref 20–50)
IRON SERPL-MCNC: 83 UG/DL (ref 35–150)
LYMPHOCYTES # BLD AUTO: 43 % (ref 12–49)
LYMPHOCYTES # BLD: 2 K/UL (ref 0.8–3.5)
MAGNESIUM SERPL-MCNC: 1.6 MG/DL (ref 1.6–2.4)
MCH RBC QN AUTO: 32.2 PG (ref 26–34)
MCHC RBC AUTO-ENTMCNC: 32.6 G/DL (ref 30–36.5)
MCV RBC AUTO: 98.7 FL (ref 80–99)
MONOCYTES # BLD: 0.4 K/UL (ref 0–1)
MONOCYTES NFR BLD AUTO: 10 % (ref 5–13)
NEUTS SEG # BLD: 1.8 K/UL (ref 1.8–8)
NEUTS SEG NFR BLD AUTO: 40 % (ref 32–75)
PHOSPHATE SERPL-MCNC: 2.6 MG/DL (ref 2.6–4.7)
PLATELET # BLD AUTO: 176 K/UL (ref 150–400)
POTASSIUM SERPL-SCNC: 3.1 MMOL/L (ref 3.5–5.1)
PROTHROMBIN TIME: 11.2 SEC (ref 9–11.1)
RBC # BLD AUTO: 3.17 M/UL (ref 3.8–5.2)
SERVICE CMNT-IMP: NORMAL
SODIUM SERPL-SCNC: 142 MMOL/L (ref 136–145)
TIBC SERPL-MCNC: 179 UG/DL (ref 250–450)
VIT B12 SERPL-MCNC: 1394 PG/ML (ref 211–911)
WBC # BLD AUTO: 4.4 K/UL (ref 3.6–11)

## 2017-03-07 PROCEDURE — 36415 COLL VENOUS BLD VENIPUNCTURE: CPT | Performed by: FAMILY MEDICINE

## 2017-03-07 PROCEDURE — 82746 ASSAY OF FOLIC ACID SERUM: CPT | Performed by: INTERNAL MEDICINE

## 2017-03-07 PROCEDURE — 85610 PROTHROMBIN TIME: CPT | Performed by: FAMILY MEDICINE

## 2017-03-07 PROCEDURE — 83540 ASSAY OF IRON: CPT | Performed by: INTERNAL MEDICINE

## 2017-03-07 PROCEDURE — 82607 VITAMIN B-12: CPT | Performed by: INTERNAL MEDICINE

## 2017-03-07 PROCEDURE — 83735 ASSAY OF MAGNESIUM: CPT | Performed by: INTERNAL MEDICINE

## 2017-03-07 PROCEDURE — 65390000012 HC CONDITION CODE 44 OBSERVATION

## 2017-03-07 PROCEDURE — 82962 GLUCOSE BLOOD TEST: CPT

## 2017-03-07 PROCEDURE — 84100 ASSAY OF PHOSPHORUS: CPT | Performed by: INTERNAL MEDICINE

## 2017-03-07 PROCEDURE — 99218 HC RM OBSERVATION: CPT

## 2017-03-07 PROCEDURE — 74011250636 HC RX REV CODE- 250/636: Performed by: FAMILY MEDICINE

## 2017-03-07 PROCEDURE — 74011250637 HC RX REV CODE- 250/637: Performed by: INTERNAL MEDICINE

## 2017-03-07 PROCEDURE — 85025 COMPLETE CBC W/AUTO DIFF WBC: CPT | Performed by: INTERNAL MEDICINE

## 2017-03-07 PROCEDURE — 80048 BASIC METABOLIC PNL TOTAL CA: CPT | Performed by: INTERNAL MEDICINE

## 2017-03-07 RX ADMIN — POLYETHYLENE GLYCOL 3350 17 G: 17 POWDER, FOR SOLUTION ORAL at 10:01

## 2017-03-07 RX ADMIN — LOSARTAN POTASSIUM 25 MG: 25 TABLET ORAL at 10:01

## 2017-03-07 RX ADMIN — METOPROLOL TARTRATE 25 MG: 25 TABLET ORAL at 10:01

## 2017-03-07 RX ADMIN — SODIUM CHLORIDE 125 ML/HR: 900 INJECTION, SOLUTION INTRAVENOUS at 01:28

## 2017-03-07 RX ADMIN — Medication 10 ML: at 07:19

## 2017-03-07 RX ADMIN — SODIUM CHLORIDE 125 ML/HR: 900 INJECTION, SOLUTION INTRAVENOUS at 08:46

## 2017-03-07 RX ADMIN — PANTOPRAZOLE SODIUM 40 MG: 40 TABLET, DELAYED RELEASE ORAL at 07:17

## 2017-03-07 RX ADMIN — RISPERIDONE 0.5 MG: 1 TABLET ORAL at 10:01

## 2017-03-07 NOTE — PROGRESS NOTES
Hospitalist Progress Note          Damaris Swan M.D. Josey: [phone]  Call physician on-call through the  7pm-7am        Date of visit:  3/7/2017    Date of Service:  3/7/2017  NAME:  Julia Mcneill  :  1950  MRN:  [id number]      Admission Summary:   A 42-year-old    female with past medical history of anxiety, asthma, COPD, type 2   diabetes mellitus, dyspepsia, hemorrhoids and hypertension, who   presented to the emergency department via EMS from home with   multiple complaints, including lower back pain, pelvic pain, left   lower quadrant abdominal pain, and BRBPR. Patient was admitted for further work-up. Interval history / Subjective:   Patient is upset because she feels like she should have had the colonoscopy already. However, she is still constipated and only having small BM's. I discussed with her that we might not be able to see much if the colonoscopy were to be performed right away. Assessment & Plan:     LLQ abdominal pain with BRBPR:   - CT abdomen/ pelvis 3/6: There is a large amount of rectal stool. There is no bowel obstruction. There is sigmoid diverticulosis without diverticulitis. No other acute abnormality in the abdomen or pelvis.   - suspect the BRBPR is hemorrhoidal, possibly due to diverticulosis;   - Patient had a large BM this afternoon, but still feels like she has not gone completely;  - add MiraLax and PRN enema;  - GI consult pending;  - will start a clear liquid diet;  - Discussed the results of CT with the patient;   - discussed the need to increase fiber intake and the importance to stay well hydrated, especially in the context of long term use of Oxycodone;   - patient reports poor appetite and poor PO Intake;   - H&H with slow decline over the past 24-48h, mostly due to dilution;   - Patient likely needs a colonoscopy and possibly EGD, but if the H&H stays stable, this can possibly be done as outpatient;   - patient may also need a gastric emptying study since she reports a lot of vomiting after she eats, lack of appetite and progressive weight loss. - 3/7: H&H slightly down from yesterday, patient continues to have small BM's with BRB on top;   - still constipated; Code status: full   DVT prophylaxis: B SCD's, ambulatory. Care Plan discussed with: Patient/Family and Nurse  Disposition: Home w/Family and TBD   Anticipate discharge today if ok with GI. Hospital Problems  Date Reviewed: 3/6/2017          Codes Class Noted POA    * (Principal)GI bleed ICD-10-CM: K92.2  ICD-9-CM: 578.9  3/6/2017 Unknown                Review of Systems:   Pertinent items are noted in HPI. Vital Signs:    Last 24hrs VS reviewed since prior progress note. Most recent are:  Visit Vitals    /71 (BP 1 Location: Right arm, BP Patient Position: At rest)    Pulse 68    Temp 97.6 °F (36.4 °C)    Resp 16    Ht 5' 5\" (1.651 m)    Wt 59.9 kg (132 lb)    SpO2 97%    BMI 21.97 kg/m2       No intake or output data in the 24 hours ending 03/07/17 1045     Physical Examination:             Constitutional:  No acute distress, cooperative, pleasant    ENT:  Oral mucous moist, oropharynx benign. Neck supple,    Resp:  CTA bilaterally. No wheezing/rhonchi/rales. No accessory muscle use   CV:  Regular rhythm, normal rate, no murmurs, gallops, rubs    GI:  Soft, non distended, non tender. normoactive bowel sounds;    Musculoskeletal:  No edema; Neurologic:  Moves all extremities.   AAOx3;            Data Review:    Review and/or order of clinical lab test  Review and/or order of tests in the radiology section of CPT  Review and/or order of tests in the medicine section of CPT      Labs:     Recent Labs      03/07/17   0435  03/06/17   1711  03/06/17   0310   WBC  4.4   --   6.5   HGB  10.2*  11.8  13.2   HCT  31.3*  36.0  39.9   PLT  176   --   201     Recent Labs      03/07/17   0435  03/06/17   0310   NA  142 136   K  3.1*  3.5   CL  110*  100   CO2  24  30   BUN  3*  9   CREA  0.47*  0.60   GLU  76  168*   CA  7.3*  8.5   MG  1.6   --    PHOS  2.6   --      Recent Labs      03/06/17   0310   SGOT  32   ALT  28   AP  95   TBILI  0.5   TP  6.9   ALB  2.9*   GLOB  4.0     Recent Labs      03/07/17   0435   INR  1.1   PTP  11.2*      No results for input(s): FE, TIBC, PSAT, FERR in the last 72 hours. No results found for: FOL, RBCF   No results for input(s): PH, PCO2, PO2 in the last 72 hours. No results for input(s): CPK, CKNDX, TROIQ in the last 72 hours.     No lab exists for component: CPKMB  No results found for: CHOL, CHOLX, CHLST, CHOLV, HDL, LDL, DLDL, LDLC, DLDLP, TGL, TGLX, TRIGL, TRIGP, CHHD, CHHDX  Lab Results   Component Value Date/Time    Glucose (POC) 90 03/07/2017 07:21 AM    Glucose (POC) 78 03/07/2017 07:10 AM    Glucose (POC) 88 03/06/2017 11:06 PM    Glucose (POC) 101 03/06/2017 03:12 PM    Glucose (POC) 102 03/06/2017 09:14 AM     Lab Results   Component Value Date/Time    Color YELLOW/STRAW 03/06/2017 03:10 AM    Appearance CLEAR 03/06/2017 03:10 AM    Specific gravity 1.028 03/06/2017 03:10 AM    pH (UA) 7.0 03/06/2017 03:10 AM    Protein NEGATIVE  03/06/2017 03:10 AM    Glucose NEGATIVE  03/06/2017 03:10 AM    Ketone NEGATIVE  03/06/2017 03:10 AM    Bilirubin NEGATIVE  03/06/2017 03:10 AM    Urobilinogen 0.2 03/06/2017 03:10 AM    Nitrites NEGATIVE  03/06/2017 03:10 AM    Leukocyte Esterase NEGATIVE  03/06/2017 03:10 AM    Epithelial cells FEW 03/06/2017 03:10 AM    Bacteria NEGATIVE  03/06/2017 03:10 AM    WBC 0-4 03/06/2017 03:10 AM    RBC 10-20 03/06/2017 03:10 AM         Medications Reviewed:     Current Facility-Administered Medications   Medication Dose Route Frequency    phenyleph-min oil-petrolatum (PREPARATION H) 0.25-14-74.9 % ointment   Rectal QID PRN    sodium chloride (NS) flush 5-10 mL  5-10 mL IntraVENous Q8H    sodium chloride (NS) flush 5-10 mL  5-10 mL IntraVENous PRN    pantoprazole (PROTONIX) 40 mg in sodium chloride 0.9 % 10 mL injection  40 mg IntraVENous BID    glucose chewable tablet 16 g  4 Tab Oral PRN    dextrose (D50W) injection syrg 12.5-25 g  12.5-25 g IntraVENous PRN    glucagon (GLUCAGEN) injection 1 mg  1 mg IntraMUSCular PRN    polyethylene glycol (MIRALAX) packet 17 g  17 g Oral DAILY    mineral oil (FLEET) enema   Rectal PRN    oxyCODONE IR (ROXICODONE) tablet 5 mg  5 mg Oral Q4H PRN    ondansetron (ZOFRAN ODT) tablet 4 mg  4 mg Oral Q6H PRN    cetirizine (ZYRTEC) tablet 10 mg  10 mg Oral QHS    insulin NPH (NOVOLIN N, HUMULIN N) injection 30 Units  30 Units SubCUTAneous ACB&D    losartan (COZAAR) tablet 25 mg  25 mg Oral DAILY    metoprolol tartrate (LOPRESSOR) tablet 25 mg  25 mg Oral DAILY    risperiDONE (RisperDAL) tablet 0.5 mg  0.5 mg Oral DAILY    pantoprazole (PROTONIX) tablet 40 mg  40 mg Oral ACB    albuterol (PROVENTIL VENTOLIN) nebulizer solution 2.5 mg  2.5 mg Nebulization Q4H PRN    arformoterol (BROVANA) neb solution 15 mcg  15 mcg Nebulization BID RT    And    budesonide (PULMICORT) 500 mcg/2 ml nebulizer suspension  500 mcg Nebulization BID RT    insulin lispro (HUMALOG) injection   SubCUTAneous AC&HS     ______________________________________________________________________  EXPECTED LENGTH OF STAY: 2d 4h  ACTUAL LENGTH OF STAY:          1                 Zoran Harper MD

## 2017-03-07 NOTE — DIABETES MGMT
NURSING: HYPOGLYCEMIC RISK ASSESSMENT    Tamika Do has an increased risk for hypoglycemia due to to the following conditions: inconsistent po intake    Noted glucose events: 78 on 3/7/2017    Please continue to monitor BG levels, document po intake and follow the hypoglycemia protocol for treatment. Please document treatment, rechecked value, and physician notified in progress notes. You can use the smart text \". hypoglyce\" to document each episode. Any questions please call [facility] at [phone] (pager). Thank you. Ila Vera.  Christopher Ham, RN, BSN, MPH  Diabetes [address] [facility]

## 2017-03-07 NOTE — DISCHARGE SUMMARY
Discharge Summary       PATIENT ID: Nick Danielle  MRN: [id number]   YOB: 1950    DATE OF ADMISSION: 3/6/2017  2:53 AM    DATE OF DISCHARGE: 3/7/2017   PRIMARY CARE PROVIDER: Colby Zendejas MD     ATTENDING PHYSICIAN: Ewa Lowery M.D.  DISCHARGING PROVIDER: Ewa Lowery MD    To contact this individual call [phone] and ask the  to page. If unavailable ask to be transferred the Adult Hospitalist Department. CONSULTATIONS: IP CONSULT TO HOSPITALIST  IP CONSULT TO GASTROENTEROLOGY    PROCEDURES/SURGERIES: * No surgery found *    ADMITTING DIAGNOSES & HOSPITAL COURSE:   A 63-year-old    female with past medical history of anxiety, asthma, COPD, type 2   diabetes mellitus, dyspepsia, hemorrhoids and hypertension, who   presented to the emergency department via EMS from home with   multiple complaints, including lower back pain, pelvic pain, left   lower quadrant abdominal pain, and BRBPR. Patient was admitted for further work-up. LLQ abdominal pain with BRBPR:   - CT abdomen/ pelvis 3/6: There is a large amount of rectal stool. There is no bowel obstruction. There is sigmoid diverticulosis without diverticulitis. No other acute abnormality in the abdomen or pelvis.   - suspect the BRBPR is hemorrhoidal, possibly due to diverticulosis;   - Patient had a large BM this afternoon, but still feels like she has not gone completely;  - add MiraLax and PRN enema;  - GI consult pending;  - will start a clear liquid diet;  - Discussed the results of CT with the patient;   - discussed the need to increase fiber intake and the importance to stay well hydrated, especially in the context of long term use of Oxycodone;   - patient reports poor appetite and poor PO Intake;   - H&H with slow decline over the past 24-48h, mostly due to dilution;   - Patient likely needs a colonoscopy and possibly EGD, but if the H&H stays stable, this can possibly be done as outpatient;   - patient may also need a gastric emptying study since she reports a lot of vomiting after she eats, lack of appetite and progressive weight loss. - 3/7: H&H slightly down from yesterday, patient continues to have small BM's with BRB on top;   - still constipated;      Patient was discharged to home in stable condition per her request.   She is to follow-up with GI as outpatient for a colonoscopy. DISCHARGE DIAGNOSES / PLAN:      LLQ abdominal pain with BRBPR: suspect due to constipation/hemorrhoids, possibly diverticulosis. CT abdomen/ pelvis 3/6: There is a large amount of rectal stool. There is no bowel obstruction. There is sigmoid diverticulosis without diverticulitis. No other acute abnormality in the abdomen or pelvis. Outpatient f/u with GI for a colonoscopy.             PENDING TEST RESULTS:   At the time of discharge the following test results are still pending: None    FOLLOW UP APPOINTMENTS:    Follow-up Information     Follow up With Details Comments Contact [facility]    [facility] Aging On 3/6/2017 may be able to provide guideance regarding availability of senior living communities in and around [address] [place] [id number] [phone]    Rob Marroquin MD   Patient can only remember the practice name and not the physician      Gaby Cabello. Edelmira Moy MD Schedule an appointment as soon as possible for a visit in 2 weeks For colonoscopy  [place] [address] [address]             ADDITIONAL CARE RECOMMENDATIONS:   DIVERTICULOSIS: PATIENT INSTRUCTIONS    FOLLOW-UP:  Call your physician immediately if you have any fevers greater than 102.5,  increasing abdominal pain, or nausea/vomiting. CAUSE:  Some people may have congenital diverticulosis, but most people develop diverticulosis around or after age 36 due to a low-fiber, high-fat diet, along with inadequate fluid intake.  This is very prevalent in the industrialized world where we eat a lot of processed, low fiber foods. Diverticuli develop due to firm stool and high pressures in the colon, along with secondary spasm, which causes outpouchings to occus where the small arteries penetrate the wall of the colon to feed the internal lining. These occur most commonly on the left side and lower portions of the colon. Diverticuli can then cause bleeding from the arteries at these sites of weakness when they rupture or the diveritculi can get blocked with stool and debris and become obstructed, causing diverticulosis, which is due to an infection. When these are infected, diverticulitis, it is often treated with antibiotics and bowel rest, but when severe, recurrent, or if rupture of the colon occurs, it may require surgery. Following appropriate dietary changes and taking the proper precautions is therefore very important. DIET:  You should increase your dietary fiber intake and take a fiber supplement twice a day. Make sure that you are taking a supplement that is just fiber and is not a laxative, which should be noted on the package. Starting a fiber supplement may cause increased gas, more frequent bowel movements, and distension at first but this should improve after a couple of weeks. Try to eat whole wheat breads and pasta, more fruits and vegetables, along with brown rice and plenty of fluids. Avoid small undigestible food items that could get stuck in these outpouchings, such as unpopped popcorn kernals, whole corn, small undigestible seeds, etc..    ACTIVITY:  Exercise is also a great way to prevent constipation and is encouraged. Always make sure you take in plenty of fluids when exercising. MEDICATIONS:  Take an over-the-counter fiber supplement as noted above twice daily. If your symptoms don't improve with fiber and dietary changes alone your physician may also recommend psyllium or methylcellulose as well.   If your physician has placed you on an antibiotic it is critical that you take the full course of these, even if your symptoms have improved, and that you not miss any doses. QUESTIONS:  Please feel free to call your physician or the hospital  if you have any questions, and they will be glad to assist you. If you have further questions it may also be helpful to meet with a dietitician. DISCHARGE MEDICATIONS:  Current Discharge Medication List      CONTINUE these medications which have NOT CHANGED    Details   naproxen (NAPROSYN) 500 mg tablet Take 500 mg by mouth two (2) times daily as needed. insulin NPH (NOVOLIN N) 100 unit/mL injection 30 Units by SubCUTAneous route Before breakfast and dinner. cetirizine (ZYRTEC) 10 mg tablet Take 10 mg by mouth nightly. oxyCODONE IR (ROXICODONE) 5 mg immediate release tablet Take 5 mg by mouth every four (4) hours as needed for Pain. Patient usually takes at least twice daily. ondansetron (ZOFRAN ODT) 4 mg disintegrating tablet Take 4 mg by mouth every six (6) hours as needed for Nausea. Cholecalciferol, Vitamin D3, 50,000 unit cap Take 1 Cap by mouth Every Thursday. omeprazole (PRILOSEC) 20 mg capsule Take 20 mg by mouth daily. calcium carbonate (TUMS) 200 mg calcium (500 mg) chew Take 1 Tab by mouth daily. PROAIR HFA 90 mcg/actuation inhaler Take 2 Puffs by inhalation every four (4) hours as needed for Shortness of Breath. Usually uses at least once daily. ALPRAZolam (XANAX) 1 mg tablet Take 1 mg by mouth three (3) times daily as needed. Refills: 2      SYMBICORT 80-4.5 mcg/actuation HFAA inhaler Take 2 Puffs by inhalation two (2) times a day. metoprolol (LOPRESSOR) 25 mg tablet Take 25 mg by mouth daily. Refills: 6      risperiDONE (RISPERDAL) 0.5 mg tablet Take 0.5 mg by mouth daily. Refills: 2      metFORMIN (GLUCOPHAGE) 1,000 mg tablet Take 1,000 mg by mouth two (2) times daily (with meals).  Patient unsure of dose or frequency - Rx Query states filled 2/18 #60 for 30 day supply      losartan (COZAAR) 25 mg tablet Take 25 mg by mouth daily. Patient didn't recognize medication name - Rx Query states filled 2/18 #30 for 30 day supply         STOP taking these medications       OTHER Comments:   Reason for Stopping:         oxyCODONE-acetaminophen (PERCOCET) 5-325 mg per tablet Comments:   Reason for Stopping:                 NOTIFY YOUR PHYSICIAN FOR ANY OF THE FOLLOWING:   Fever over 101 degrees for 24 hours. Chest pain, shortness of breath, fever, chills, nausea, vomiting, diarrhea, change in mentation, falling, weakness, bleeding. Severe pain or pain not relieved by medications. Or, any other signs or symptoms that you may have questions about.     DISPOSITION:  x  Home With:   OT  PT  HH  RN       Long term SNF/Inpatient Rehab    Independent/assisted living    Hospice    Other:       PATIENT CONDITION AT DISCHARGE:     Functional status    Poor     Deconditioned    x Independent      Cognition    x Lucid     Forgetful     Dementia      Catheters/lines (plus indication)    Johnson     PICC     PEG    x None      Code status    x Full code     DNR      PHYSICAL EXAMINATION AT DISCHARGE:   Refer to Progress Note 3/07/2017      CHRONIC MEDICAL DIAGNOSES:  Problem List as of 3/7/2017  Date Reviewed: 3/6/2017          Codes Class Noted - Resolved    * (Principal)GI bleed ICD-10-CM: K92.2  ICD-9-CM: 578.9  3/6/2017 - Present        Osteoarthritis of finger of right hand (Chronic) ICD-10-CM: M19.041  ICD-9-CM: 715.94  4/9/2015 - Present              Greater than 30 minutes were spent with the patient on counseling and coordination of care    Signed:   Lucas Sheridan MD  3/7/2017  2:18 PM

## 2017-03-07 NOTE — PROGRESS NOTES
Patient is resting in bed she is very upset with not having her colonoscopy today and not seeing the GI physician. She thinks she is getting one today or she is going home. She feel like she should have had one already. She has had several BM today and her rectum is sore. Will continue to monitor patient for changes in her condition. 2330 patient is resting in bed she is restless and agitated will continue to monitor patient for changes in her condition. 3488 patient is resting in bed no complaint at this time her pain is 5/10 which she considers to be good. Will continue to monitor patient for changes in her condition. Blood drawn and sent to lab.    0600 patient resting in bed    0710 BG 78 treated with OJ recheck in 15 minutes. 0725 BG recheck was 90    0730 Bedside and Verbal shift change report given to BRITTANY Haro  (oncoming nurse) by Chase James RN  (offgoing nurse). Report included the following information SBAR, MAR, Recent Results and Med Rec Status.

## 2017-03-07 NOTE — DISCHARGE INSTRUCTIONS
Discharge Instructions       PATIENT ID: Isom Hashimoto  MRN: [id number]   YOB: 1950    DATE OF ADMISSION: 3/6/2017  2:53 AM    DATE OF DISCHARGE: 3/7/2017    PRIMARY CARE PROVIDER: Ana Cristina Burden MD     ATTENDING PHYSICIAN: Rocio Márquez MD  DISCHARGING PROVIDER: Rocio Márquez MD    To contact this individual call [phone] and ask the  to page. If unavailable ask to be transferred the Adult Hospitalist Department. DISCHARGE DIAGNOSES   Bright Red Blood Per Rectum suspect hemorrhoidal bleed     CONSULTATIONS: IP CONSULT TO HOSPITALIST  IP CONSULT TO GASTROENTEROLOGY    PROCEDURES/SURGERIES: * No surgery found *    PENDING TEST RESULTS:   At the time of discharge the following test results are still pending: None    FOLLOW UP APPOINTMENTS:   Follow-up Information     Follow up With Details Comments Contact [facility]    [facility] on Aging On 3/6/2017 may be able to provide guideance regarding availability of senior living communities in and around [address] [place] [address]  [phone]    [facility] Other, MD   Patient can only remember the practice name and not the physician      Brannon Arellano. Daisy Carrion MD Schedule an appointment as soon as possible for a visit in 2 weeks For colonoscopy  [facility] [address]             ADDITIONAL CARE RECOMMENDATIONS:   DIVERTICULOSIS: PATIENT INSTRUCTIONS    FOLLOW-UP:  Call your physician immediately if you have any fevers greater than 102.5,  increasing abdominal pain, or nausea/vomiting. CAUSE:  Some people may have congenital diverticulosis, but most people develop diverticulosis around or after age 36 due to a low-fiber, high-fat diet, along with inadequate fluid intake. This is very prevalent in the industrialized world where we eat a lot of processed, low fiber foods.   Diverticuli develop due to firm stool and high pressures in the colon, along with secondary spasm, which causes outpouchings to occus where the small arteries penetrate the wall of the colon to feed the internal lining. These occur most commonly on the left side and lower portions of the colon. Diverticuli can then cause bleeding from the arteries at these sites of weakness when they rupture or the diveritculi can get blocked with stool and debris and become obstructed, causing diverticulosis, which is due to an infection. When these are infected, diverticulitis, it is often treated with antibiotics and bowel rest, but when severe, recurrent, or if rupture of the colon occurs, it may require surgery. Following appropriate dietary changes and taking the proper precautions is therefore very important. DIET:  You should increase your dietary fiber intake and take a fiber supplement twice a day. Make sure that you are taking a supplement that is just fiber and is not a laxative, which should be noted on the package. Starting a fiber supplement may cause increased gas, more frequent bowel movements, and distension at first but this should improve after a couple of weeks. Try to eat whole wheat breads and pasta, more fruits and vegetables, along with brown rice and plenty of fluids. Avoid small undigestible food items that could get stuck in these outpouchings, such as unpopped popcorn kernals, whole corn, small undigestible seeds, etc..    ACTIVITY:  Exercise is also a great way to prevent constipation and is encouraged. Always make sure you take in plenty of fluids when exercising. MEDICATIONS:  Take an over-the-counter fiber supplement as noted above twice daily. If your symptoms don't improve with fiber and dietary changes alone your physician may also recommend psyllium or methylcellulose as well.   If your physician has placed you on an antibiotic it is critical that you take the full course of these, even if your symptoms have improved, and that you not miss any doses. QUESTIONS:  Please feel free to call your physician or the hospital  if you have any questions, and they will be glad to assist you. If you have further questions it may also be helpful to meet with a dietitician. DISCHARGE MEDICATIONS:   See Medication Reconciliation Form    · It is important that you take the medication exactly as they are prescribed. · Keep your medication in the bottles provided by the pharmacist and keep a list of the medication names, dosages, and times to be taken in your wallet. · Do not take other medications without consulting your doctor. NOTIFY YOUR PHYSICIAN FOR ANY OF THE FOLLOWING:   Fever over 101 degrees for 24 hours. Chest pain, shortness of breath, fever, chills, nausea, vomiting, diarrhea, change in mentation, falling, weakness, bleeding. Severe pain or pain not relieved by medications. Or, any other signs or symptoms that you may have questions about. DISPOSITION:   x Home With:   OT  PT  HH  RN       SNF/Inpatient Rehab/LTAC    Independent/assisted living    Hospice    Other:     CDMP Checked:   Yes IK     PROBLEM LIST Updated:  Yes IK       Signed:   Alma Duncan MD  3/7/2017  2:10 PM             Diverticulosis: Care Instructions  Your Care Instructions  In diverticulosis, pouches called diverticula form in the wall of the large intestine (colon). The pouches do not cause any pain or other symptoms. Most people who have diverticulosis do not know they have it. But the pouches sometimes bleed, and if they become infected, they can cause pain and other symptoms. When this happens, it is called diverticulitis. Diverticula form when pressure pushes the wall of the colon outward at certain weak points. A diet that is too low in fiber can cause diverticula. Follow-up care is a key part of your treatment and safety. Be sure to make and go to all appointments, and call your doctor if you are having problems.  It's also a good idea to know your test results and keep a list of the medicines you take. How can you care for yourself at home? · Include fruits, leafy green vegetables, beans, and whole grains in your diet each day. These foods are high in fiber. · Take a fiber supplement, such as Citrucel or Metamucil, every day if needed. Read and follow all instructions on the label. · Drink plenty of fluids, enough so that your urine is light yellow or clear like water. If you have kidney, heart, or liver disease and have to limit fluids, talk with your doctor before you increase the amount of fluids you drink. · Get at least 30 minutes of exercise on most days of the week. Walking is a good choice. You also may want to do other activities, such as running, swimming, cycling, or playing tennis or team sports. · Cut out foods that cause gas, pain, or other symptoms. When should you call for help? Call your doctor now or seek immediate medical care if:  · You have belly pain. · You pass maroon or very bloody stools. · You have a fever. · You have nausea and vomiting. · You have unusual changes in your bowel movements or abdominal swelling. · You have burning pain when you urinate. · You have abnormal vaginal discharge. · You have shoulder pain. · You have cramping pain that does not get better when you have a bowel movement or pass gas. · You pass gas or stool from your urethra while urinating. Watch closely for changes in your health, and be sure to contact your doctor if you have any problems. Where can you learn more? Go to http://shakir-ros.info/. Enter S089 in the search box to learn more about \"Diverticulosis: Care Instructions. \"  Current as of: August 9, 2016  Content Version: 11.1  © 4515-4599 [employer]. Care instructions adapted under license by [employer] (which disclaims liability or warranty for this information).  If you have questions about a medical condition or this instruction, always ask your healthcare professional. [place] [address] any warranty or liability for your use of this information.

## 2018-02-12 ENCOUNTER — HOSPITAL ENCOUNTER (EMERGENCY)
Age: 68
Discharge: HOME | End: 2018-02-12
Attending: EMERGENCY MEDICINE
Payer: MEDICARE

## 2018-02-12 VITALS
DIASTOLIC BLOOD PRESSURE: 88 MMHG | RESPIRATION RATE: 18 BRPM | SYSTOLIC BLOOD PRESSURE: 129 MMHG | HEIGHT: 65 IN | WEIGHT: 135 LBS | BODY MASS INDEX: 22.49 KG/M2 | HEART RATE: 111 BPM | TEMPERATURE: 98 F | OXYGEN SATURATION: 99 %

## 2018-02-12 DIAGNOSIS — J21.9 ACUTE BRONCHIOLITIS DUE TO UNSPECIFIED ORGANISM: ICD-10-CM

## 2018-02-12 DIAGNOSIS — R07.89 CHEST WALL PAIN: Primary | ICD-10-CM

## 2018-02-12 DIAGNOSIS — M54.12 CERVICAL RADICULOPATHY: ICD-10-CM

## 2018-02-12 DIAGNOSIS — Z72.0 TOBACCO ABUSE: ICD-10-CM

## 2018-02-12 PROCEDURE — 93005 ELECTROCARDIOGRAM TRACING: CPT

## 2018-02-12 PROCEDURE — 99284 EMERGENCY DEPT VISIT MOD MDM: CPT

## 2018-02-12 RX ORDER — NAPROXEN 500 MG/1
500 TABLET ORAL
Qty: 20 TAB | Refills: 0 | Status: SHIPPED | OUTPATIENT
Start: 2018-02-12 | End: 2018-10-29

## 2018-02-12 RX ORDER — HYDROCODONE BITARTRATE AND HOMATROPINE METHYLBROMIDE 1.5; 5 MG/5ML; MG/5ML
5 SYRUP ORAL 4 TIMES DAILY
Qty: 100 ML | Refills: 0 | Status: SHIPPED | OUTPATIENT
Start: 2018-02-12 | End: 2018-02-16

## 2018-02-12 NOTE — DISCHARGE INSTRUCTIONS
Bronchitis: Care Instructions  Your Care Instructions    Bronchitis is inflammation of the bronchial tubes, which carry air to the lungs. The tubes swell and produce mucus, or phlegm. The mucus and inflamed bronchial tubes make you cough. You may have trouble breathing. Most cases of bronchitis are caused by viruses like those that cause colds. Antibiotics usually do not help and they may be harmful. Bronchitis usually develops rapidly and lasts about 2 to 3 weeks in otherwise healthy people. Follow-up care is a key part of your treatment and safety. Be sure to make and go to all appointments, and call your doctor if you are having problems. It's also a good idea to know your test results and keep a list of the medicines you take. How can you care for yourself at home? · Take all medicines exactly as prescribed. Call your doctor if you think you are having a problem with your medicine. · Get some extra rest.  · Take an over-the-counter pain medicine, such as acetaminophen (Tylenol), ibuprofen (Advil, Motrin), or naproxen (Aleve) to reduce fever and relieve body aches. Read and follow all instructions on the label. · Do not take two or more pain medicines at the same time unless the doctor told you to. Many pain medicines have acetaminophen, which is Tylenol. Too much acetaminophen (Tylenol) can be harmful. · Take an over-the-counter cough medicine that contains dextromethorphan to help quiet a dry, hacking cough so that you can sleep. Avoid cough medicines that have more than one active ingredient. Read and follow all instructions on the label. · Breathe moist air from a humidifier, hot shower, or sink filled with hot water. The heat and moisture will thin mucus so you can cough it out. · Do not smoke. Smoking can make bronchitis worse. If you need help quitting, talk to your doctor about stop-smoking programs and medicines. These can increase your chances of quitting for good.   When should you call for help? Call 911 anytime you think you may need emergency care. For example, call if:  ? · You have severe trouble breathing. ?Call your doctor now or seek immediate medical care if:  ? · You have new or worse trouble breathing. ? · You cough up dark brown or bloody mucus (sputum). ? · You have a new or higher fever. ? · You have a new rash. ? Watch closely for changes in your health, and be sure to contact your doctor if:  ? · You cough more deeply or more often, especially if you notice more mucus or a change in the color of your mucus. ? · You are not getting better as expected. Where can you learn more? Go to http://shakir-ros.info/. Enter H333 in the search box to learn more about \"Bronchitis: Care Instructions. \"  Current as of: May 12, 2017  Content Version: 11.4  © 0999-8795 [employer]. Care instructions adapted under license by [employer] (which disclaims liability or warranty for this information). If you have questions about a medical condition or this instruction, always ask your healthcare professional. [place] [address] any warranty or liability for your use of this information. Pinched Nerve in the Neck: Care Instructions  Your Care Instructions  A pinched nerve in the neck happens when a vertebra or disc in the upper part of your spine is damaged. This damage can happen because of an injury. Or it can just happen with age. The changes caused by the damage may put pressure on a nearby nerve root, pinching it. This causes symptoms such as sharp pain in your neck, shoulder, arm, hand, or back. You may also have tingling or numbness. Sometimes it makes your arm weaker. The symptoms are usually worse when you turn your head or strain your neck. For many people, the symptoms get better over time and finally go away. Early treatment usually includes medicines for pain and swelling.  Sometimes physical therapy and special exercises may help. Follow-up care is a key part of your treatment and safety. Be sure to make and go to all appointments, and call your doctor if you are having problems. It's also a good idea to know your test results and keep a list of the medicines you take. How can you care for yourself at home? · Be safe with medicines. Read and follow all instructions on the label. ¨ If the doctor gave you a prescription medicine for pain, take it as prescribed. ¨ If you are not taking a prescription pain medicine, ask your doctor if you can take an over-the-counter medicine. · Try using a heating pad on a low or medium setting for 15 to 20 minutes every 2 or 3 hours. Try a warm shower in place of one session with the heating pad. You can also buy single-use heat wraps that last up to 8 hours. · You can also try an ice pack for 10 to 15 minutes every 2 to 3 hours. There isn't strong evidence that either heat or ice will help. But you can try them to see if they help you. · Don't spend too long in one position. Take short breaks to move around and change positions. · Wear a seat belt and shoulder harness when you are in a car. · Sleep with a pillow under your head and neck that keeps your neck straight. · If you were given a neck brace (cervical collar) to limit neck motion, wear it as instructed for as many days as your doctor tells you to. Do not wear it longer than you were told to. Wearing a brace for too long can lead to neck stiffness and can weaken the neck muscles. · Follow your doctor's instructions for gentle neck-stretching exercises. · Do not smoke. Smoking can slow healing of your discs. If you need help quitting, talk to your doctor about stop-smoking programs and medicines. These can increase your chances of quitting for good. · Avoid strenuous work or exercise until your doctor says it is okay. When should you call for help? Call 911 anytime you think you may need emergency care.  For example, call if:  ? · You are unable to move an arm or a leg at all. ?Call your doctor now or seek immediate medical care if:  ? · You have new or worse symptoms in your arms, legs, chest, belly, or buttocks. Symptoms may include:  ¨ Numbness or tingling. ¨ Weakness. ¨ Pain. ? · You lose bladder or bowel control. ? Watch closely for changes in your health, and be sure to contact your doctor if:  ? · You are not getting better as expected. Where can you learn more? Go to http://shakir-ros.info/. Enter N136 in the search box to learn more about \"Pinched Nerve in the Neck: Care Instructions. \"  Current as of: March 21, 2017  Content Version: 11.4  © 4728-7133 [employer]. Care instructions adapted under license by [employer] (which disclaims liability or warranty for this information). If you have questions about a medical condition or this instruction, always ask your healthcare professional. [place] [address] any warranty or liability for your use of this information. Numbness and Tingling: Care Instructions  Your Care Instructions    Many things can cause numbness or tingling. Swelling may put pressure on a nerve. This could cause you to lose feeling or have a pins-and-needles sensation on part of your body. Nerves may be damaged from trauma, toxins, or diseases, such as diabetes or multiple sclerosis (MS). Sometimes, though, the cause is not clear. If there is no clear reason for your symptoms, and you are not having any other symptoms, your doctor may suggest watching and waiting for a while to see if the numbness or tingling goes away on its own. Your doctor may want you to have blood or nerve tests to find the cause of your symptoms. Follow-up care is a key part of your treatment and safety. Be sure to make and go to all appointments, and call your doctor if you are having problems.  It's also a good idea to know your test results and keep a list of the medicines you take. How can you care for yourself at home? · If your doctor prescribes medicine, take it exactly as directed. Call your doctor if you think you are having a problem with your medicine. · If you have any swelling, put ice or a cold pack on the area for 10 to 20 minutes at a time. Put a thin cloth between the ice and your skin. When should you call for help? Call 911 anytime you think you may need emergency care. For example, call if:  ? · You have weakness, numbness, or tingling in both legs. ? · You lose bowel or bladder control. ? · You have symptoms of a stroke. These may include:  ¨ Sudden numbness, tingling, weakness, or loss of movement in your face, arm, or leg, especially on only one side of your body. ¨ Sudden vision changes. ¨ Sudden trouble speaking. ¨ Sudden confusion or trouble understanding simple statements. ¨ Sudden problems with walking or balance. ¨ A sudden, severe headache that is different from past headaches. ? Watch closely for changes in your health, and be sure to contact your doctor if you have any problems, or if:  ? · You do not get better as expected. Where can you learn more? Go to http://shakir-ros.info/. Enter O890 in the search box to learn more about \"Numbness and Tingling: Care Instructions. \"  Current as of: October 14, 2016  Content Version: 11.4  © 7281-3076 [employer]. Care instructions adapted under license by [employer] (which disclaims liability or warranty for this information). If you have questions about a medical condition or this instruction, always ask your healthcare professional. [place] [address] any warranty or liability for your use of this information. Stopping Smoking: Care Instructions  Your Care Instructions    Cigarette smokers crave the nicotine in cigarettes. Giving it up is much harder than simply changing a habit.  Your body has to stop craving the nicotine. It is hard to quit, but you can do it. There are many tools that people use to quit smoking. You may find that combining tools works best for you. There are several steps to quitting. First you get ready to quit. Then you get support to help you. After that, you learn new skills and behaviors to become a nonsmoker. For many people, a necessary step is getting and using medicine. Your doctor will help you set up the plan that best meets your needs. You may want to attend a smoking cessation program to help you quit smoking. When you choose a program, look for one that has proven success. Ask your doctor for ideas. You will greatly increase your chances of success if you take medicine as well as get counseling or join a cessation program.  Some of the changes you feel when you first quit tobacco are uncomfortable. Your body will miss the nicotine at first, and you may feel short-tempered and grumpy. You may have trouble sleeping or concentrating. Medicine can help you deal with these symptoms. You may struggle with changing your smoking habits and rituals. The last step is the tricky one: Be prepared for the smoking urge to continue for a time. This is a lot to deal with, but keep at it. You will feel better. Follow-up care is a key part of your treatment and safety. Be sure to make and go to all appointments, and call your doctor if you are having problems. It's also a good idea to know your test results and keep a list of the medicines you take. How can you care for yourself at home? · Ask your family, friends, and coworkers for support. You have a better chance of quitting if you have help and support. · Join a support group, such as Nicotine Anonymous, for people who are trying to quit smoking. · Consider signing up for a smoking cessation program, such as the American Lung Association's Freedom from Smoking program.  · Set a quit date.  Pick your date carefully so that it is not right in the middle of a big deadline or stressful time. Once you quit, do not even take a puff. Get rid of all ashtrays and lighters after your last cigarette. Clean your house and your clothes so that they do not smell of smoke. · Learn how to be a nonsmoker. Think about ways you can avoid those things that make you reach for a cigarette. ¨ Avoid situations that put you at greatest risk for smoking. For some people, it is hard to have a drink with friends without smoking. For others, they might skip a coffee break with coworkers who smoke. ¨ Change your daily routine. Take a different route to work or eat a meal in a different place. · Cut down on stress. Calm yourself or release tension by doing an activity you enjoy, such as reading a book, taking a hot bath, or gardening. · Talk to your doctor or pharmacist about nicotine replacement therapy, which replaces the nicotine in your body. You still get nicotine but you do not use tobacco. Nicotine replacement products help you slowly reduce the amount of nicotine you need. These products come in several forms, many of them available over-the-counter:  ¨ Nicotine patches  ¨ Nicotine gum and lozenges  ¨ Nicotine inhaler  · Ask your doctor about bupropion (Wellbutrin) or varenicline (Chantix), which are prescription medicines. They do not contain nicotine. They help you by reducing withdrawal symptoms, such as stress and anxiety. · Some people find hypnosis, acupuncture, and massage helpful for ending the smoking habit. · Eat a healthy diet and get regular exercise. Having healthy habits will help your body move past its craving for nicotine. · Be prepared to keep trying. Most people are not successful the first few times they try to quit. Do not get mad at yourself if you smoke again. Make a list of things you learned and think about when you want to try again, such as next week, next month, or next year. Where can you learn more?   Go to http://shakir-ros.info/. Enter L826 in the search box to learn more about \"Stopping Smoking: Care Instructions. \"  Current as of: March 20, 2017  Content Version: 11.4  © 4334-4611 [employer]. Care instructions adapted under license by [employer] (which disclaims liability or warranty for this information). If you have questions about a medical condition or this instruction, always ask your healthcare professional. [place] [address] any warranty or liability for your use of this information. Musculoskeletal Chest Pain: Care Instructions  Your Care Instructions    Chest pain is not always a sign that something is wrong with your heart or that you have another serious problem. The doctor thinks your chest pain is caused by strained muscles or ligaments, inflamed chest cartilage, or another problem in your chest, rather than by your heart. You may need more tests to find the cause of your chest pain. Follow-up care is a key part of your treatment and safety. Be sure to make and go to all appointments, and call your doctor if you are having problems. It's also a good idea to know your test results and keep a list of the medicines you take. How can you care for yourself at home? · Take pain medicines exactly as directed. ¨ If the doctor gave you a prescription medicine for pain, take it as prescribed. ¨ If you are not taking a prescription pain medicine, ask your doctor if you can take an over-the-counter medicine. · Rest and protect the sore area. · Stop, change, or take a break from any activity that may be causing your pain or soreness. · Put ice or a cold pack on the sore area for 10 to 20 minutes at a time. Try to do this every 1 to 2 hours for the next 3 days (when you are awake) or until the swelling goes down. Put a thin cloth between the ice and your skin.   · After 2 or 3 days, apply a heating pad set on low or a warm cloth to the area that hurts. Some doctors suggest that you go back and forth between hot and cold. · Do not wrap or tape your ribs for support. This may cause you to take smaller breaths, which could increase your risk of lung problems. · Mentholated creams such as Bengay or Icy Hot may soothe sore muscles. Follow the instructions on the package. · Follow your doctor's instructions for exercising. · Gentle stretching and massage may help you get better faster. Stretch slowly to the point just before pain begins, and hold the stretch for at least 15 to 30 seconds. Do this 3 or 4 times a day. Stretch just after you have applied heat. · As your pain gets better, slowly return to your normal activities. Any increased pain may be a sign that you need to rest a while longer. When should you call for help? Call 911 anytime you think you may need emergency care. For example, call if:  ? · You have chest pain or pressure. This may occur with:  ¨ Sweating. ¨ Shortness of breath. ¨ Nausea or vomiting. ¨ Pain that spreads from the chest to the neck, jaw, or one or both shoulders or arms. ¨ Dizziness or lightheadedness. ¨ A fast or uneven pulse. After calling 911, chew 1 adult-strength aspirin. Wait for an ambulance. Do not try to drive yourself. ? · You have sudden chest pain and shortness of breath, or you cough up blood. ?Call your doctor now or seek immediate medical care if:  ? · You have any trouble breathing. ? · Your chest pain gets worse. ? · Your chest pain occurs consistently with exercise and is relieved by rest.   ? Watch closely for changes in your health, and be sure to contact your doctor if:  ? · Your chest pain does not get better after 1 week. Where can you learn more? Go to http://shakir-ros.info/. Enter V293 in the search box to learn more about \"Musculoskeletal Chest Pain: Care Instructions. \"  Current as of: March 20, 2017  Content Version: 11.4  © 3020-8217 [employer]. Care instructions adapted under license by [employer] (which disclaims liability or warranty for this information). If you have questions about a medical condition or this instruction, always ask your healthcare professional. [address] any warranty or liability for your use of this information.

## 2018-02-12 NOTE — ED PROVIDER NOTES
EMERGENCY DEPARTMENT HISTORY AND PHYSICAL EXAM      Date: 2/12/2018  Patient Name: Adam Farrar    History of Presenting Illness     Chief Complaint   Patient presents with    Chest Pain    Numbness       History Provided By: Patient    HPI: Adam Farrar, 74 y.o. female with PMHx significant for COPD, HTN, DM and Asthma, presents ambulatory to the ED with cc of constant left sided chest wall pain since 2/3/2018 with intermittent RUE tingling/ right fingers numbness for the last week. Pt reports that her chest pain began after she picking up an object. She states when she picked up the object she felt a pop in her chest. She states that this pain is exacerbated when she coughs and twists her body. She notes additional sx of cough at baseline. She reports taking some OTC medication that gave her no relief. She denies recent fall. She reports that she is currently looking for a new PCP, noting [facility]. She denies sx of neck pain. SHx: +tobacco use     PCP: Rob Marroquin MD    There are no other complaints, changes, or physical findings at this time. Current Outpatient Prescriptions   Medication Sig Dispense Refill    naproxen (NAPROSYN) 500 mg tablet Take 1 Tab by mouth every twelve (12) hours as needed for Pain. 20 Tab 0    HYDROcodone-homatropine (HYCODAN) 5-1.5 mg/5 mL (5 mL) syrup Take 5 mL by mouth four (4) times daily. Max Daily Amount: 20 mL. 100 mL 0    insulin NPH (NOVOLIN N) 100 unit/mL injection 30 Units by SubCUTAneous route Before breakfast and dinner.  cetirizine (ZYRTEC) 10 mg tablet Take 10 mg by mouth nightly.  ondansetron (ZOFRAN ODT) 4 mg disintegrating tablet Take 4 mg by mouth every six (6) hours as needed for Nausea.  Cholecalciferol, Vitamin D3, 50,000 unit cap Take 1 Cap by mouth Every Thursday.  omeprazole (PRILOSEC) 20 mg capsule Take 20 mg by mouth daily.       metFORMIN (GLUCOPHAGE) 1,000 mg tablet Take 1,000 mg by mouth two (2) times daily (with meals). Patient unsure of dose or frequency - Rx Query states filled 2/18 #60 for 30 day supply      losartan (COZAAR) 25 mg tablet Take 25 mg by mouth daily. Patient didn't recognize medication name - Rx Query states filled 2/18 #30 for 30 day supply      calcium carbonate (TUMS) 200 mg calcium (500 mg) chew Take 1 Tab by mouth daily.  PROAIR HFA 90 mcg/actuation inhaler Take 2 Puffs by inhalation every four (4) hours as needed for Shortness of Breath. Usually uses at least once daily.  ALPRAZolam (XANAX) 1 mg tablet Take 1 mg by mouth three (3) times daily as needed. 2    SYMBICORT 80-4.5 mcg/actuation HFAA inhaler Take 2 Puffs by inhalation two (2) times a day.  metoprolol (LOPRESSOR) 25 mg tablet Take 25 mg by mouth daily. 6    risperiDONE (RISPERDAL) 0.5 mg tablet Take 0.5 mg by mouth daily. 2       Past History     Past Medical History:  Past Medical History:   Diagnosis Date    Anxiety     Asthma     COPD     Diabetes ([address]     Dyspepsia and other specified disorders of function of stomach     Hemorrhoids     Hypertension        Past Surgical History:  Past Surgical History:   Procedure Laterality Date    HX GYN      Thermal Ablation    HX HEART CATHETERIZATION      exploration    HX LAPAROTOMY      HX ORTHOPAEDIC      right knee    HX OTHER SURGICAL      Colonoscopy    HX TUBAL LIGATION         Family History:  Family History   Problem Relation Age of Onset    Heart Disease Father     Cancer Sister        Social History:  Social History   Substance Use Topics    Smoking status: Current Every Day Smoker     Years: 38.00    Smokeless tobacco: Never Used    Alcohol use 1.0 oz/week     2 Glasses of wine per week      Comment: occasionally       Allergies:   Allergies   Allergen Reactions    Tylenol-Codeine #3 [Acetaminophen-Codeine] Itching     Patient states she cannot tolerate APAP         Review of Systems   Review of Systems   Constitutional: Negative for chills and fever. HENT: Negative for congestion, rhinorrhea, sneezing and sore throat. Eyes: Negative for redness and visual disturbance. Respiratory: Positive for cough. Negative for shortness of breath. Cardiovascular: Positive for chest pain. Negative for leg swelling. Gastrointestinal: Negative for abdominal pain, nausea and vomiting. Genitourinary: Negative for difficulty urinating and frequency. Musculoskeletal: Negative for back pain, myalgias, neck pain and neck stiffness. Skin: Negative for rash. Neurological: Positive for numbness. Negative for dizziness, syncope, weakness and headaches. Hematological: Negative for adenopathy. All other systems reviewed and are negative. Physical Exam   Physical Exam   Constitutional: She is oriented to person, place, and time. She appears well-developed and well-nourished. HENT:   Head: Normocephalic and atraumatic. Mouth/Throat: Oropharynx is clear and moist.   Eyes: Conjunctivae and EOM are normal.   Neck: Normal range of motion and full passive range of motion without pain. Neck supple. Cardiovascular: Regular rhythm, S1 normal, S2 normal, normal heart sounds, intact distal pulses and normal pulses. Tachycardia present. No murmur heard. Pulmonary/Chest: Effort normal. No respiratory distress. She has no wheezes. Dry cough  Left chest wall reproducible tenderness. Abdominal: Soft. Normal appearance and bowel sounds are normal. She exhibits no distension. There is no tenderness. There is no rebound. Musculoskeletal: Normal range of motion. Neurological: She is alert and oriented to person, place, and time. She has normal strength. Skin: Skin is warm, dry and intact. No rash noted. Psychiatric: She has a normal mood and affect. Her speech is normal and behavior is normal. Judgment and thought content normal.   Nursing note and vitals reviewed.         Diagnostic Study Results     Labs -     Recent Results (from the past 12 hour(s))   EKG, 12 LEAD, INITIAL    Collection Time: 02/12/18  1:20 PM   Result Value Ref Range    Ventricular Rate 100 BPM    Atrial Rate 100 BPM    P-R Interval 180 ms    QRS Duration 84 ms    Q-T Interval 376 ms    QTC Calculation (Bezet) 485 ms    Calculated P Axis 52 degrees    Calculated R Axis 52 degrees    Calculated T Axis 81 degrees    Diagnosis       Normal sinus rhythm  Normal ECG  When compared with ECG of 06-MAR-2017 07:25,  Questionable change in QRS axis         Medical Decision Making   I am the first provider for this patient. I reviewed the vital signs, available nursing notes, past medical history, past surgical history, family history and social history. Vital Signs-Reviewed the patient's vital signs. Patient Vitals for the past 12 hrs:   Temp Pulse Resp BP SpO2   02/12/18 1309 98 °F (36.7 °C) (!) 111 18 129/88 99 %     EKG interpretation: (Preliminary) 1320  Rhythm: normal sinus rhythm; and regular . Rate (approx.): 101; Axis: normal; PA interval: 178; QRS interval: 84; ST/T wave: normal  Written by Ulysses Bookbinder, ED Scribe, as dictated by Melody Kline MD    Records Reviewed: Nursing Notes and Old Medical Records    Provider Notes (Medical Decision Making):   DDx: bronchitis, cervical radiculopathy     ED Course:   Initial assessment performed. The patients presenting problems have been discussed, and they are in agreement with the care plan formulated and outlined with them. I have encouraged them to ask questions as they arise throughout their visit. Disposition:  Discharge Note:  1:52 PM  The patient is ready for discharge. The patient's signs, symptoms, diagnosis, and discharge instruction have been discussed and the patient has conveyed their understanding. The patient is to follow up as recommended or return to the ER should their symptoms worsen. Plan has been discussed and the patient is in agreement.   Written by Ulysses Bookbinder, ED Scribe, as dictated by Esther Wen MD.    PLAN:  1. Current Discharge Medication List      START taking these medications    Details   HYDROcodone-homatropine (HYCODAN) 5-1.5 mg/5 mL (5 mL) syrup Take 5 mL by mouth four (4) times daily. Max Daily Amount: 20 mL. Qty: 100 mL, Refills: 0    Associated Diagnoses: Acute bronchiolitis due to unspecified organism         CONTINUE these medications which have CHANGED    Details   naproxen (NAPROSYN) 500 mg tablet Take 1 Tab by mouth every twelve (12) hours as needed for Pain. Qty: 20 Tab, Refills: 0    Associated Diagnoses: Chest wall pain         STOP taking these medications       oxyCODONE IR (ROXICODONE) 5 mg immediate release tablet Comments:   Reason for Stoppin.   Follow-up Information     Follow up With Details Comments Postbox Marlys MD Schedule an appointment as soon as possible for a visit or one of the PCP's from the list provided [address] [address] [address]      [facility] EMERGENCY DEPT  As needed, If symptoms worsen [address] [facility]  [phone]        Return to ED if worse     Diagnosis     Clinical Impression:   1. Chest wall pain    2. Cervical radiculopathy    3. Acute bronchiolitis due to unspecified organism    4. Tobacco abuse        Attestations: This is note is prepared by Chelsie Dean, acting as Scribe for Esther Wen MD.    Esther Wen MD The scribe's documentation has been prepared under my direction and personally reviewed by me in its entirety. I confirm that the note above accurately reflects all work, treatment, procedures, and medical decision making performed by me.

## 2018-02-12 NOTE — ED NOTES
Pt arrived in ER with c/o left side chest pain,rt arm tingling x 2-3 weeks,sts\"I am so nervous,I have court tomorrow,I don't want to admit,I really need to go court. \"Denies any sob,dizziness. Emergency Department Nursing Plan of Care       The Nursing Plan of Care is developed from the Nursing assessment and Emergency Department Attending provider initial evaluation. The plan of care may be reviewed in the ED Provider note.     The Plan of Care was developed with the following considerations:   Patient / Family readiness to learn indicated by:verbalized understanding  Persons(s) to be included in education: patient  Barriers to Learning/Limitations:No    Signed     Sharda Luna RN    2/12/2018   1:31 PM

## 2018-02-12 NOTE — ED TRIAGE NOTES
Complains of left sided chest pain times one week and RUE numbness times two weeks: per pt she is concerned because she has to be in court tomorrow

## 2018-02-14 LAB
ATRIAL RATE: 101 BPM
CALCULATED P AXIS, ECG09: 51 DEGREES
CALCULATED R AXIS, ECG10: 53 DEGREES
CALCULATED T AXIS, ECG11: 83 DEGREES
DIAGNOSIS, 93000: NORMAL
P-R INTERVAL, ECG05: 178 MS
Q-T INTERVAL, ECG07: 374 MS
QRS DURATION, ECG06: 84 MS
QTC CALCULATION (BEZET), ECG08: 484 MS
VENTRICULAR RATE, ECG03: 101 BPM

## 2018-02-16 ENCOUNTER — OFFICE VISIT (OUTPATIENT)
Dept: INTERNAL MEDICINE CLINIC | Age: 68
End: 2018-02-16

## 2018-02-16 VITALS
DIASTOLIC BLOOD PRESSURE: 77 MMHG | RESPIRATION RATE: 16 BRPM | BODY MASS INDEX: 22.13 KG/M2 | WEIGHT: 132.8 LBS | OXYGEN SATURATION: 98 % | HEART RATE: 82 BPM | TEMPERATURE: 98.7 F | SYSTOLIC BLOOD PRESSURE: 113 MMHG | HEIGHT: 65 IN

## 2018-02-16 DIAGNOSIS — R07.9 CHEST PAIN, UNSPECIFIED TYPE: Primary | ICD-10-CM

## 2018-02-16 DIAGNOSIS — R05.9 COUGH: ICD-10-CM

## 2018-02-16 DIAGNOSIS — E11.9 CONTROLLED TYPE 2 DIABETES MELLITUS WITHOUT COMPLICATION, WITH LONG-TERM CURRENT USE OF INSULIN (HCC): ICD-10-CM

## 2018-02-16 DIAGNOSIS — J44.9 CHRONIC OBSTRUCTIVE PULMONARY DISEASE, UNSPECIFIED COPD TYPE (HCC): ICD-10-CM

## 2018-02-16 DIAGNOSIS — M06.9 RHEUMATOID ARTHRITIS, INVOLVING UNSPECIFIED SITE, UNSPECIFIED RHEUMATOID FACTOR PRESENCE: ICD-10-CM

## 2018-02-16 DIAGNOSIS — Z79.4 CONTROLLED TYPE 2 DIABETES MELLITUS WITHOUT COMPLICATION, WITH LONG-TERM CURRENT USE OF INSULIN (HCC): ICD-10-CM

## 2018-02-16 DIAGNOSIS — I10 HYPERTENSION, UNSPECIFIED TYPE: ICD-10-CM

## 2018-02-16 RX ORDER — OXYCODONE HYDROCHLORIDE 5 MG/1
5 CAPSULE ORAL
Qty: 20 CAP | Refills: 0 | Status: SHIPPED | OUTPATIENT
Start: 2018-02-16 | End: 2018-03-16 | Stop reason: SDUPTHER

## 2018-02-16 NOTE — MR AVS SNAPSHOT
[address] 
[phone] Patient: Pelon Guerrero MRN: [id number] ARV:[birth year] Visit Information Date & Time Provider Department Dept. Phone Encounter #  
 2/16/2018 10:00 AM Zoya Benton, [address] [place] [address],[address] [phone] [id number] Follow-up Instructions Return in about 4 months (around 6/16/2018) for DM, COPD. Upcoming Health Maintenance Date Due Hepatitis C Screening 1950 ZOSTER VACCINE AGE 60> 5/16/2012 DTaP/Tdap/Td series (1 - Tdap) 7/7/2013 BREAST CANCER SCRN MAMMOGRAM 5/9/2015 GLAUCOMA SCREENING Q2Y 7/16/2017 OSTEOPOROSIS SCREENING (DEXA) 7/16/2017 Pneumococcal 65+ Low/Medium Risk (1 of 2 - PCV13) 7/16/2017 MEDICARE YEARLY EXAM 7/16/2017 Influenza Age 5 to Adult 8/1/2017 FOBT Q 1 YEAR AGE 50-75 3/6/2018 Allergies as of 2/16/2018  Review Complete On: 2/16/2018 By: Cory Mccarthy Severity Noted Reaction Type Reactions Tylenol-codeine #3 [Acetaminophen-codeine]  07/06/2013    Itching Patient states she cannot tolerate APAP Current Immunizations  Never Reviewed Name Date Td, Adsorbed PF 7/6/2013 12:09 PM  
  
 Not reviewed this visit You Were Diagnosed With   
  
 Codes Comments Chest pain, unspecified type    -  Primary ICD-10-CM: R07.9 ICD-9-CM: 786.50 Cough     ICD-10-CM: R05 ICD-9-CM: 786.2 Hypertension, unspecified type     ICD-10-CM: I10 
ICD-9-CM: 401.9 Chronic obstructive pulmonary disease, unspecified COPD type ([facility]     ICD-10-CM: J44.9 ICD-9-CM: 031 Controlled type 2 diabetes mellitus without complication, with long-term current use of insulin (HCC)     ICD-10-CM: E11.9, Z79.4 ICD-9-CM: 250.00, V58.67 Rheumatoid arthritis, involving unspecified site, unspecified rheumatoid factor presence ([facility]     ICD-10-CM: M06.9 ICD-9-CM: 714.0 Vitals BP Pulse Temp Resp Height(growth percentile) Weight(growth percentile) 113/77 (BP 1 Location: Left arm, BP Patient Position: Sitting) 82 98.7 °F (37.1 °C) (Oral) 16 5' 5\" (1.651 m) 132 lb 12.8 oz (60.2 kg) SpO2 BMI OB Status Smoking Status 98% 22.1 kg/m2 Postmenopausal Current Every Day Smoker Vitals History BMI and BSA Data Body Mass Index Body Surface Area  
 22.1 kg/m 2 1.66 m 2 Preferred Pharmacy Pharmacy Name Phone [id number] [place], [place] - [address] [facility] [address] [place] [address] [phone] Your Updated Medication List  
  
   
This list is accurate as of: 2/16/18 11:46 AM.  Always use your most recent med list.  
  
  
  
  
 ALPRAZolam 1 mg tablet Commonly known as:  Olene Shoe Take 1 mg by mouth three (3) times daily as needed. calcium carbonate 200 mg calcium (500 mg) Chew Commonly known as:  TUMS Take 1 Tab by mouth daily. cetirizine 10 mg tablet Commonly known as:  ZYRTEC Take 10 mg by mouth nightly. Cholecalciferol (Vitamin D3) 50,000 unit Cap Take 1 Cap by mouth Every Thursday. losartan 25 mg tablet Commonly known as:  COZAAR Take 25 mg by mouth daily. Patient didn't recognize medication name - Rx Query states filled 2/18 #30 for 30 day supply  
  
 metFORMIN 1,000 mg tablet Commonly known as:  GLUCOPHAGE Take 1,000 mg by mouth two (2) times daily (with meals). Patient unsure of dose or frequency - Rx Query states filled 2/18 #60 for 30 day supply  
  
 metoprolol tartrate 25 mg tablet Commonly known as:  LOPRESSOR Take 25 mg by mouth daily. naproxen 500 mg tablet Commonly known as:  NAPROSYN Take 1 Tab by mouth every twelve (12) hours as needed for Pain. NovoLIN N NPH U-100 Insulin 100 unit/mL injection Generic drug:  insulin NPH  
30 Units by SubCUTAneous route Before breakfast and dinner. omeprazole 20 mg capsule Commonly known as:  PRILOSEC  
 Take 20 mg by mouth daily. PROAIR HFA 90 mcg/actuation inhaler Generic drug:  albuterol Take 2 Puffs by inhalation every four (4) hours as needed for Shortness of Breath. Usually uses at least once daily. risperiDONE 0.5 mg tablet Commonly known as:  RisperDAL Take 0.5 mg by mouth daily. SYMBICORT 80-4.5 mcg/actuation Hfaa Generic drug:  budesonide-formoterol Take 2 Puffs by inhalation two (2) times a day. ZOFRAN ODT 4 mg disintegrating tablet Generic drug:  ondansetron Take 4 mg by mouth every six (6) hours as needed for Nausea. We Performed the Following CBC WITH AUTOMATED DIFF [80031 CPT(R)] HEMOGLOBIN A1C WITH EAG [96188 CPT(R)] LIPID PANEL [27980 CPT(R)] METABOLIC PANEL, COMPREHENSIVE [21583 CPT(R)] MICROALBUMIN, UR, RAND [id number] CPT(R)] REFERRAL TO PULMONARY DISEASE [ZMI79 Custom] REFERRAL TO RHEUMATOLOGY [EZZ41 Custom] RHEUMATOID FACTOR, QL [id number] CPT(R)] SED RATE (ESR) [id number] CPT(R)] Follow-up Instructions Return in about 4 months (around 6/16/2018) for DM, COPD. To-Do List   
 02/16/2018 PFT:  PULMONARY FUNCTION TEST   
  
 02/16/2018 Imaging:  XR CHEST PA LAT   
  
 03/15/2018 2:20 PM  
  Appointment with [facility] DWAINE 1 at [facility] ([phone]) Shower or bathe using soap and water. Do not use deodorant, powder, perfumes, or lotion the day of your exam.  If your prior mammograms were not performed at [facility] please bring films with you or forward prior images 2 days before your procedure. Check in at registration 15min before your appointment time unless you were instructed to do otherwise. A script is not necessary, but if you have one, please bring it on the day of the mammogram or have it faxed to the department. [facility] [phone] [facility]  [phone] [facility] [address]  [phone] [facility] [phone] [address] [place] [phone] 03/15/2018 3:00 PM  
  Appointment with [facility] DEXA 1 at [address] [facility] ([phone]) Please, no calcium supplements or antacids that coat the stomach (ex: Tums, Mylanta) 24 hours prior to procedure. Maintain normal diet and medications. Dairy products are allowed. Wear an outfit with an elastic waistband (no zipper or metal snaps). Check in at registration 15min before your appointment time unless you were instructed to do otherwise. Referral Information Referral ID Referred By Referred To  
  
 [id number] Neela HUGGINS MD   
   [address] [facility] [address] [employer] [address] [place] Phone: [phone] Fax: [phone] Visits Status Start Date End Date 1 New Request 2/16/18 2/16/19 If your referral has a status of pending review or denied, additional information will be sent to support the outcome of this decision. Referral ID Referred By Referred To  
 [id number] Belen Soares [facility] [address] [facility] [address] [place] [address] [place], [address] [facility] Visits Status Start Date End Date 1 New Request 2/16/18 2/16/19 If your referral has a status of pending review or denied, additional information will be sent to support the outcome of this decision. Introducing [facility]! [employer] introduces [employer] patient portal. Now you can access parts of your medical record, email your doctor's office, and request medication refills online. 1. In your internet browser, go to [email or web address] 2. Click on the First Time User? Click Here link in the Sign In box. You will see the New Member Sign Up page. 3. Enter your [facility] Access Code exactly as it appears below. You will not need to use this code after youve completed the sign-up process. If you do not sign up before the expiration date, you must request a new code. · One[employer] Access Code: [facility] Expires: 4/28/2018  4:42 PM 
 
4. Enter the last four digits of your Social Security Number (xxxx) and Date of Birth (mm/dd/yyyy) as indicated and click Submit. You will be taken to the next sign-up page. 5. Create a One[employer] ID. This will be your One[employer] login ID and cannot be changed, so think of one that is secure and easy to remember. 6. Create a One[employer] password. You can change your password at any time. 7. Enter your Password Reset Question and Answer. This can be used at a later time if you forget your password. 8. Enter your e-mail address. You will receive e-mail notification when new information is available in [address] 9. Click Sign Up. You can now view and download portions of your medical record. 10. Click the Download Summary menu link to download a portable copy of your medical information. If you have questions, please visit the Frequently Asked Questions section of the [employer] website. Remember, One[employer] is NOT to be used for urgent needs. For medical emergencies, dial 911. Now available from your iPhone and Android! Please provide this summary of care documentation to your next provider. Your primary care clinician is listed as [place]. If you have any questions after today's visit, please call [phone].

## 2018-02-16 NOTE — PROGRESS NOTES
1. Have you been to the ER, urgent care clinic since your last visit? Hospitalized since your last visit?no    2. Have you seen or consulted any other health care providers outside of the [facility] since your last visit? Include any pap smears or colon screening.  no

## 2018-02-16 NOTE — PROGRESS NOTES
SUBJECTIVE:   Ms. Amy Monae is a 74 y.o. female who is here for follow up of routine medical issues. She was a longterm patient of Dr. Rambo Urban, who retired. Chief Complaint   Patient presents with   [address] [place] [address]       DM: On insulin. \"I think that's doing fine. \"   Has some chest pressure with deep breathing. About 2 weeks. Seen in ER on 2/12:   Amy Monae, 74 y.o. female with PMHx significant for COPD, HTN, DM and Asthma, presents ambulatory to the ED with cc of constant left sided chest wall pain since 2/3/2018 with intermittent RUE tingling/ right fingers numbness for the last week. Pt reports that her chest pain began after she picking up an object. She states when she picked up the object she felt a pop in her chest. She states that this pain is exacerbated when she coughs and twists her body. She notes additional sx of cough at baseline. She reports taking some OTC medication that gave her no relief. She denies recent fall. She reports that she is currently looking for a new PCP, noting she used to be followed by [facility]. She denies sx of neck pain.      \"I've had this cold for awhile. \"  Given steroids and cough syrup. Has a chronic cough and \"a lot of mucus. \" \"Sometimes I hack and hack. \"  It appears she was admitted a year ago for LLQ abdom pain and BRBPR; CT showed diverticulosis with no diverticulitis. At this time, she is otherwise doing well and has brought no other complaints to my attention today. For a list of the medical issues addressed today, see the assessment and plan below.     PMH:   Past Medical History:   Diagnosis Date    Anxiety     Asthma     COPD     Diabetes ([address]     Dyspepsia and other specified disorders of function of stomach     Hemorrhoids     Hypertension        Past Surgical History:   Procedure Laterality Date    HX GYN      Thermal Ablation    HX HEART CATHETERIZATION      exploration    HX LAPAROTOMY      HX ORTHOPAEDIC      right knee    HX OTHER SURGICAL      Colonoscopy    HX TUBAL LIGATION         All: is allergic to tylenol-codeine #3 [acetaminophen-codeine]. Current Outpatient Prescriptions   Medication Sig    naproxen (NAPROSYN) 500 mg tablet Take 1 Tab by mouth every twelve (12) hours as needed for Pain.  HYDROcodone-homatropine (HYCODAN) 5-1.5 mg/5 mL (5 mL) syrup Take 5 mL by mouth four (4) times daily. Max Daily Amount: 20 mL.  insulin NPH (NOVOLIN N) 100 unit/mL injection 30 Units by SubCUTAneous route Before breakfast and dinner.  cetirizine (ZYRTEC) 10 mg tablet Take 10 mg by mouth nightly.  ondansetron (ZOFRAN ODT) 4 mg disintegrating tablet Take 4 mg by mouth every six (6) hours as needed for Nausea.  Cholecalciferol, Vitamin D3, 50,000 unit cap Take 1 Cap by mouth Every Thursday.  omeprazole (PRILOSEC) 20 mg capsule Take 20 mg by mouth daily.  metFORMIN (GLUCOPHAGE) 1,000 mg tablet Take 1,000 mg by mouth two (2) times daily (with meals). Patient unsure of dose or frequency - Rx Query states filled 2/18 #60 for 30 day supply    losartan (COZAAR) 25 mg tablet Take 25 mg by mouth daily. Patient didn't recognize medication name - Rx Query states filled 2/18 #30 for 30 day supply    calcium carbonate (TUMS) 200 mg calcium (500 mg) chew Take 1 Tab by mouth daily.  PROAIR HFA 90 mcg/actuation inhaler Take 2 Puffs by inhalation every four (4) hours as needed for Shortness of Breath. Usually uses at least once daily.  ALPRAZolam (XANAX) 1 mg tablet Take 1 mg by mouth three (3) times daily as needed.  SYMBICORT 80-4.5 mcg/actuation HFAA inhaler Take 2 Puffs by inhalation two (2) times a day.  metoprolol (LOPRESSOR) 25 mg tablet Take 25 mg by mouth daily.  risperiDONE (RISPERDAL) 0.5 mg tablet Take 0.5 mg by mouth daily. No current facility-administered medications for this visit. FH: family history includes Cancer in her sister; Heart Disease in her father.    SH:  reports that she has been smoking. She has smoked for the past 38.00 years. She has never used smokeless tobacco. She reports that she drinks about 1.0 oz of alcohol per week  She reports that she does not use illicit drugs. ROS: See above; Complete ROS otherwise negative. OBJECTIVE:   Vitals:   Visit Vitals    /77 (BP 1 Location: Left arm, BP Patient Position: Sitting)    Pulse 82    Temp 98.7 °F (37.1 °C) (Oral)    Resp 16    Ht 5' 5\" (1.651 m)    Wt 132 lb 12.8 oz (60.2 kg)    SpO2 98%    BMI 22.1 kg/m2      Gen: Pleasant 74 y.o.  female in NAD. HEENT: PERRLA. EOMI. OP moist and pink. Neck: Supple. No LAD. HEART: RRR, No M/G/R.    LUNGS: CTAB No W/R. ABDOMEN: S, NT, ND, BS+. EXTREMITIES: Warm. No C/C/E.  MUSCULOSKELETAL: Normal ROM, muscle strength 5/5 all groups. NEURO: Alert and oriented x 3. Cranial nerves grossly intact. No focal sensory or motor deficits noted. SKIN: Warm. Dry. No rashes or other lesions noted. Lab Results   Component Value Date/Time    Sodium 142 03/07/2017 04:35 AM    Potassium 3.1 (L) 03/07/2017 04:35 AM    Chloride 110 (H) 03/07/2017 04:35 AM    CO2 24 03/07/2017 04:35 AM    Anion gap 8 03/07/2017 04:35 AM    Glucose 76 03/07/2017 04:35 AM    BUN 3 (L) 03/07/2017 04:35 AM    Creatinine 0.47 (L) 03/07/2017 04:35 AM    BUN/Creatinine ratio 6 (L) 03/07/2017 04:35 AM    GFR est AA >60 03/07/2017 04:35 AM    GFR est non-AA >60 03/07/2017 04:35 AM    Calcium 7.3 (L) 03/07/2017 04:35 AM    Bilirubin, total 0.5 03/06/2017 03:10 AM    ALT (SGPT) 28 03/06/2017 03:10 AM    AST (SGOT) 32 03/06/2017 03:10 AM    Alk.  phosphatase 95 03/06/2017 03:10 AM    Protein, total 6.9 03/06/2017 03:10 AM    Albumin 2.9 (L) 03/06/2017 03:10 AM    Globulin 4.0 03/06/2017 03:10 AM    A-G Ratio 0.7 (L) 03/06/2017 03:10 AM        Lab Results   Component Value Date/Time    Hemoglobin A1c 7.2 (H) 03/06/2017 03:10 AM       Lab Results   Component Value Date/Time    WBC 4.4 03/07/2017 04:35 AM    HGB 10.2 (L) 03/07/2017 04:35 AM    HCT 31.3 (L) 03/07/2017 04:35 AM    PLATELET 437 02/42/9802 04:35 AM    MCV 98.7 03/07/2017 04:35 AM         ASSESSMENT/ PLAN: Diagnoses and all orders for this visit:    1. Chest pain, pleuritic + Cough: Rule out pneumonia. -     XR CHEST PA LAT; Future  2. Hypertension, unspecified type  -     METABOLIC PANEL, COMPREHENSIVE  -     LIPID PANEL  -     CBC WITH AUTOMATED DIFF  3. Chronic obstructive pulmonary disease, unspecified COPD type ([facility] [address]  -     PULMONARY FUNCTION TEST; Future  -     [facility]  4. Controlled type 2 diabetes mellitus without complication, with long-term current use of insulin (HCC)  -     METABOLIC PANEL, COMPREHENSIVE  -     LIPID PANEL  -     HEMOGLOBIN A1C WITH EAG  -     MICROALBUMIN, UR, RAND  5. Rheumatoid arthritis, involving unspecified site, unspecified rheumatoid factor presence (HCC)  -     SED RATE (ESR)  -     RHEUMATOID FACTOR, QL  -     REFERRAL TO RHEUMATOLOGY  -     oxyCODONE (OXYIR) 5 mg capsule; Take 1 Cap by mouth every four (4) hours as needed. Max Daily Amount: 30 mg. Follow-up Disposition:  Return in about 4 months (around 6/16/2018) for DM, COPD. I have reviewed the patient's medications and risks/side effects/benefits were discussed. Diagnosis(-es) explained to patient and questions answered. Literature provided where appropriate.

## 2018-03-09 RX ORDER — HUMAN INSULIN 100 [IU]/ML
INJECTION, SUSPENSION SUBCUTANEOUS
Qty: 40 ML | Refills: 2 | Status: SHIPPED | OUTPATIENT
Start: 2018-03-09 | End: 2019-04-01 | Stop reason: SDUPTHER

## 2018-03-15 ENCOUNTER — HOSPITAL ENCOUNTER (OUTPATIENT)
Dept: MAMMOGRAPHY | Age: 68
Discharge: HOME | End: 2018-03-15
Attending: FAMILY MEDICINE
Payer: MEDICARE

## 2018-03-15 ENCOUNTER — HOSPITAL ENCOUNTER (OUTPATIENT)
Dept: BONE DENSITY | Age: 68
Discharge: HOME | End: 2018-03-15
Attending: FAMILY MEDICINE
Payer: MEDICARE

## 2018-03-15 DIAGNOSIS — Z78.0 POST-MENOPAUSE: ICD-10-CM

## 2018-03-15 DIAGNOSIS — Z12.31 VISIT FOR SCREENING MAMMOGRAM: ICD-10-CM

## 2018-03-15 PROCEDURE — 77067 SCR MAMMO BI INCL CAD: CPT

## 2018-03-15 PROCEDURE — 77080 DXA BONE DENSITY AXIAL: CPT

## 2018-03-15 NOTE — LETTER
3/20/2018 9:22 AM 
 
Ms. Fenton Schaumann [address] [facility] [address] Dear Fenton Schaumann: 
 
Please find your most recent results below. Resulted Orders DEXA BONE DENSITY STUDY AXIAL Narrative Bone Mineral Density Indication:  post menopause Age: 74 Sex: Female. Menopause status: Postmenopausal. 
Hormone replacement therapy: No  
 
Number of falls in the past year:   None. Risk factors for osteoporosis:  None. Current medication for osteoporosis: None. Comparison: None. Technique: Imaging was performed on the Pledge51 QDR [address] [place] [address] meta-analysis fracture risk calculator (FRAX) analysis was 
performed for 10 year fracture risk probability assessment Excluded sites: L4 due to spondylosis Findings: 
  
 
Femoral Neck:  Left Bone mineral density (gm/cm2):? 0.713 
% of peak bone mass: 75 
% for age matched controls:? 93 
T-score: -1.7 Z-score: -0.4 Total Hip: Left Bone mineral density (gm/cm2):  0.700 
% of peak bone mass:   68 
% for age matched controls:  80 
T-score:   -2.1 Z-score:  -1.1 Lumbar Spine:  L1-L3 Bone mineral density (gm/cm2):  1.078 
% of peak bone mass:  96  
% for age matched controls:  80 T-score:  -0.4 Z-score:  1.6 The T score for the right distal third radius is -1.6. Impression Impression: This patient is osteopenic using the World Health Organization criteria 10 year probability of major osteoporotic fracture:  3.6% 10 year probability of hip fracture:  0.6% Recommendations: 
Therapy recommendations need to be tailored to each individual patient. Using 
the [facility] FRAX absolute fracture algorithm, the 
[address] [place] [address] recommends beginning pharmacological therapy in 
postmenopausal women and men over the age of 48 with a 8 year probability of a 
hip fracture of >3% OR with the 10 year probability of a major osteoporotic fracture of >20%. Please reconsider testing based on risk factors. Currently, Medicare will only 
reimburse for a central DXA examination every two years, unless the patient is 
on chronic glucocorticoid therapy. Note: Please note that reliable, valid comparisons cannot be made between 
studies which have been performed on machines from different manufacturers. If 
clinically warranted, a follow up study performed at this site, on the same 
unit, would allow the most sensitive assessment of change in bone mineral 
density. Shows ostopenia but not osteoporosis.  BJF  
RECOMMENDATIONS: 
 
 
Please call me if you have any questions: [phone] Sincerely, 
 
Dr. Lissette Torres

## 2018-03-16 DIAGNOSIS — M06.9 RHEUMATOID ARTHRITIS, INVOLVING UNSPECIFIED SITE, UNSPECIFIED RHEUMATOID FACTOR PRESENCE: ICD-10-CM

## 2018-03-16 RX ORDER — OXYCODONE HYDROCHLORIDE 5 MG/1
5 CAPSULE ORAL
Qty: 20 CAP | Refills: 0 | Status: SHIPPED | OUTPATIENT
Start: 2018-03-16 | End: 2018-05-08 | Stop reason: SDUPTHER

## 2018-03-16 NOTE — TELEPHONE ENCOUNTER
----- Message from Valerie Martinez sent at 3/16/2018  1:46 PM EDT -----  Regarding: Dr. Nicolas Penn Refill  Contact: [phone]  Pt is requesting a refill on Med Oxycodone / Hcl 5 mg.  Best contact [phone]        Message copied/pasted from [facility]

## 2018-03-20 ENCOUNTER — TELEPHONE (OUTPATIENT)
Dept: INTERNAL MEDICINE CLINIC | Age: 68
End: 2018-03-20

## 2018-03-20 NOTE — PROGRESS NOTES
Identified patient 2 identifiers verified. spoke to patient she wants to know should she be taking any medication?

## 2018-03-20 NOTE — TELEPHONE ENCOUNTER
----- Message from Christi Nagy MD sent at 3/16/2018  5:48 PM EDT -----  May inform patient: Shows ostopenia but not osteoporosis.   BJF

## 2018-03-29 DIAGNOSIS — R05.9 COUGH: Primary | ICD-10-CM

## 2018-03-29 NOTE — TELEPHONE ENCOUNTER
#[phone] pt states she needs this syrup as all she does is cough. Pt is asking if you can also call her in an antibiotic? Please call pt. She needs to see if this will work with the syrup. Pt would also like to discuss changing insulin to the pen.

## 2018-03-30 ENCOUNTER — TELEPHONE (OUTPATIENT)
Dept: INTERNAL MEDICINE CLINIC | Age: 68
End: 2018-03-30

## 2018-03-30 RX ORDER — HYDROCODONE BITARTRATE AND HOMATROPINE METHYLBROMIDE 1.5; 5 MG/5ML; MG/5ML
5 SYRUP ORAL 4 TIMES DAILY
Qty: 50 ML | Refills: 0 | Status: SHIPPED | OUTPATIENT
Start: 2018-03-30 | End: 2018-05-08

## 2018-04-09 RX ORDER — LORATADINE 10 MG/1
TABLET ORAL
Qty: 90 TAB | Refills: 3 | Status: SHIPPED | OUTPATIENT
Start: 2018-04-09 | End: 2020-04-03

## 2018-04-09 RX ORDER — PEN NEEDLE, DIABETIC 29 G X1/2"
NEEDLE, DISPOSABLE MISCELLANEOUS
Qty: 200 SYRINGE | Refills: 0 | Status: SHIPPED | OUTPATIENT
Start: 2018-04-09 | End: 2020-04-03 | Stop reason: SDUPTHER

## 2018-04-09 RX ORDER — IPRATROPIUM BROMIDE AND ALBUTEROL SULFATE 2.5; .5 MG/3ML; MG/3ML
SOLUTION RESPIRATORY (INHALATION)
Qty: 90 ML | Refills: 1 | Status: SHIPPED | OUTPATIENT
Start: 2018-04-09 | End: 2018-04-11 | Stop reason: SDUPTHER

## 2018-04-11 DIAGNOSIS — J45.909 ASTHMA, UNSPECIFIED ASTHMA SEVERITY, UNSPECIFIED WHETHER COMPLICATED, UNSPECIFIED WHETHER PERSISTENT: Primary | ICD-10-CM

## 2018-04-11 DIAGNOSIS — J44.9 CHRONIC OBSTRUCTIVE PULMONARY DISEASE, UNSPECIFIED COPD TYPE (HCC): ICD-10-CM

## 2018-04-12 RX ORDER — IPRATROPIUM BROMIDE AND ALBUTEROL SULFATE 2.5; .5 MG/3ML; MG/3ML
SOLUTION RESPIRATORY (INHALATION)
Qty: 90 ML | Refills: 1 | Status: SHIPPED | OUTPATIENT
Start: 2018-04-12 | End: 2018-06-29 | Stop reason: SDUPTHER

## 2018-04-13 ENCOUNTER — TELEPHONE (OUTPATIENT)
Dept: INTERNAL MEDICINE CLINIC | Age: 68
End: 2018-04-13

## 2018-04-13 NOTE — TELEPHONE ENCOUNTER
#[phone] pt states she would like a call today about appts that she couldn't make it to that Dr. Trinity Chapman wanted her to.

## 2018-04-20 DIAGNOSIS — R05.9 COUGH: Primary | ICD-10-CM

## 2018-04-20 RX ORDER — BENZONATATE 100 MG/1
100 CAPSULE ORAL
Qty: 30 CAP | Refills: 1 | Status: SHIPPED | OUTPATIENT
Start: 2018-04-20 | End: 2018-04-27

## 2018-04-20 NOTE — TELEPHONE ENCOUNTER
Patient states she needs a call back in reference to getting something different than prescription for HYDROcodone-homatropine (HYCODAN) 5-1.5 mg/5 mL (5 mL) syrup that is not covered. Patient states she needs this taken care of today as her cough has made her chest hurt & patient reports history of COPD. Patient requests to Please call today.  Thank you

## 2018-04-20 NOTE — TELEPHONE ENCOUNTER
Insurance will not cover the Hydromet  Syrup. Need an alternative. Pharmacy said that patient will have to pay out of pocket for this.

## 2018-04-24 RX ORDER — HYDROCODONE POLISTIREX AND CHLORPHENIRAMINE POLISTIREX 10; 8 MG/5ML; MG/5ML
5 SUSPENSION, EXTENDED RELEASE ORAL
Qty: 115 ML | Refills: 0 | Status: SHIPPED | OUTPATIENT
Start: 2018-04-24 | End: 2018-05-09

## 2018-05-08 ENCOUNTER — TELEPHONE (OUTPATIENT)
Dept: INTERNAL MEDICINE CLINIC | Age: 68
End: 2018-05-08

## 2018-05-08 ENCOUNTER — HOME HEALTH ADMISSION (OUTPATIENT)
Dept: HOME HEALTH SERVICES | Facility: HOME HEALTH | Age: 68
End: 2018-05-08

## 2018-05-08 ENCOUNTER — OFFICE VISIT (OUTPATIENT)
Dept: INTERNAL MEDICINE CLINIC | Age: 68
End: 2018-05-08

## 2018-05-08 VITALS
WEIGHT: 125 LBS | DIASTOLIC BLOOD PRESSURE: 93 MMHG | HEART RATE: 96 BPM | RESPIRATION RATE: 18 BRPM | TEMPERATURE: 98 F | OXYGEN SATURATION: 98 % | SYSTOLIC BLOOD PRESSURE: 144 MMHG | BODY MASS INDEX: 20.83 KG/M2 | HEIGHT: 65 IN

## 2018-05-08 DIAGNOSIS — H10.9 CONJUNCTIVITIS OF LEFT EYE, UNSPECIFIED CONJUNCTIVITIS TYPE: ICD-10-CM

## 2018-05-08 DIAGNOSIS — G89.29 OTHER CHRONIC PAIN: ICD-10-CM

## 2018-05-08 DIAGNOSIS — F41.9 ANXIETY: ICD-10-CM

## 2018-05-08 DIAGNOSIS — S81.802A WOUND OF LEFT LOWER EXTREMITY, INITIAL ENCOUNTER: ICD-10-CM

## 2018-05-08 DIAGNOSIS — R63.0 POOR APPETITE: ICD-10-CM

## 2018-05-08 DIAGNOSIS — R05.9 COUGH: Primary | ICD-10-CM

## 2018-05-08 DIAGNOSIS — F32.A DEPRESSION, UNSPECIFIED DEPRESSION TYPE: ICD-10-CM

## 2018-05-08 DIAGNOSIS — R05.9 COUGH: ICD-10-CM

## 2018-05-08 DIAGNOSIS — M06.9 RHEUMATOID ARTHRITIS, INVOLVING UNSPECIFIED SITE, UNSPECIFIED RHEUMATOID FACTOR PRESENCE: ICD-10-CM

## 2018-05-08 RX ORDER — BUSPIRONE HYDROCHLORIDE 10 MG/1
10 TABLET ORAL 3 TIMES DAILY
Qty: 90 TAB | Refills: 3 | Status: SHIPPED | OUTPATIENT
Start: 2018-05-08 | End: 2018-10-29

## 2018-05-08 RX ORDER — MIRTAZAPINE 30 MG/1
30 TABLET, FILM COATED ORAL
Qty: 30 TAB | Refills: 6 | Status: SHIPPED | OUTPATIENT
Start: 2018-05-08 | End: 2018-05-17 | Stop reason: ALTCHOICE

## 2018-05-08 RX ORDER — AMOXICILLIN AND CLAVULANATE POTASSIUM 875; 125 MG/1; MG/1
1 TABLET, FILM COATED ORAL EVERY 12 HOURS
Qty: 10 TAB | Refills: 0 | Status: SHIPPED | OUTPATIENT
Start: 2018-05-08 | End: 2018-05-13

## 2018-05-08 RX ORDER — OXYCODONE HYDROCHLORIDE 5 MG/1
5 CAPSULE ORAL
Qty: 20 CAP | Refills: 0 | Status: SHIPPED | OUTPATIENT
Start: 2018-05-08 | End: 2018-06-25

## 2018-05-08 RX ORDER — SULFACETAMIDE SODIUM 100 MG/ML
1 SOLUTION/ DROPS OPHTHALMIC
Qty: 1 BOTTLE | Refills: 1 | Status: SHIPPED | OUTPATIENT
Start: 2018-05-08 | End: 2018-06-29 | Stop reason: SDUPTHER

## 2018-05-08 NOTE — PROGRESS NOTES
1. Have you been to the ER, urgent care clinic since your last visit? Hospitalized since your last visit?no    2. Have you seen or consulted any other health care providers outside of the [facility] since your last visit? Include any pap smears or colon screening.  no

## 2018-05-08 NOTE — TELEPHONE ENCOUNTER
Call completed to patient, two identifiers verified. Patient reports that the Rx chlorpheniramine-HYDROcodone (TUSSIONEX) 10-8 mg/5 mL suspension costs $95.00. Patient is requesting an alternative. Patient thinks HYDROcodone-homatropine (HYCODAN) 5-1.5 mg/5 mL (5 mL) syrup is covered.

## 2018-05-08 NOTE — MR AVS SNAPSHOT
[address] 
[phone] Patient: Mary Anne Reyes MRN: [id number] Visit Information Date & Time Provider Department Dept. Phone Encounter #  
 5/8/2018  9:45 AM Rl Saez [address] [facility] [address] [phone] [id number] Your Appointments 6/14/2018 10:00 AM  
ROUTINE CARE with Rl Saez MD  
[facility] Appt Note: 4 month f/u for copd, dm  
 [facility] [address] [address]  
[address] [place] [address] [address] Upcoming Health Maintenance Date Due Hepatitis C Screening 1950 ZOSTER VACCINE AGE 60> 5/16/2012 DTaP/Tdap/Td series (1 - Tdap) 7/7/2013 GLAUCOMA SCREENING Q2Y 7/16/2017 Pneumococcal 65+ Low/Medium Risk (1 of 2 - PCV13) 7/16/2017 FOBT Q 1 YEAR AGE 50-75 3/6/2018 MEDICARE YEARLY EXAM 3/20/2018 Influenza Age 5 to Adult 8/1/2018 BREAST CANCER SCRN MAMMOGRAM 3/15/2020 Allergies as of 5/8/2018  Review Complete On: 5/8/2018 By: Wolfgang Juarez Severity Noted Reaction Type Reactions Tylenol-codeine #3 [Acetaminophen-codeine]  07/06/2013    Itching Patient states she cannot tolerate APAP Current Immunizations  Never Reviewed Name Date Td, Adsorbed PF 7/6/2013 12:09 PM  
  
 Not reviewed this visit You Were Diagnosed With   
  
 Codes Comments Cough    -  Primary ICD-10-CM: R59 ICD-9-CM: 786.2 Conjunctivitis of left eye, unspecified conjunctivitis type     ICD-10-CM: H10.9 ICD-9-CM: 372.30 Depression, unspecified depression type     ICD-10-CM: F32.9 ICD-9-CM: 235 Poor appetite     ICD-10-CM: R63.0 ICD-9-CM: 783.0 Other chronic pain     ICD-10-CM: G89.29 ICD-9-CM: 338.29 Vitals BP Pulse Temp Resp Height(growth percentile) Weight(growth percentile) (!) 144/93 (BP 1 Location: Left arm, BP Patient Position: Sitting) 96 98 °F (36.7 °C) (Oral) 18 5' 5\" (1.651 m) 125 lb (56.7 kg) SpO2 BMI OB Status Smoking Status 98% 20.8 kg/m2 Postmenopausal Current Every Day Smoker Vitals History BMI and BSA Data Body Mass Index Body Surface Area  
 20.8 kg/m 2 1.61 m 2 Preferred Pharmacy Pharmacy Name Phone [facility] [address] [place] [address]  [place] [phone] Your Updated Medication List  
  
   
This list is accurate as of 5/8/18 10:52 AM.  Always use your most recent med list.  
  
  
  
  
 albuterol-ipratropium 2.5 mg-0.5 mg/3 ml Nebu Commonly known as:  DUO-NEB  
USE 1 VIAL VIA NEBULIZER EVERY 4 HOURS AS NEEDED  
  
 amoxicillin-clavulanate 875-125 mg per tablet Commonly known as:  AUGMENTIN Take 1 Tab by mouth every twelve (12) hours for 5 days. BD INSULIN SYRINGE ULTRA-FINE 1 mL 31 gauge x 5/16 Syrg Generic drug:  Insulin Syringe-Needle U-100  
USE AS DIRECTED TWICE A DAY  
  
 busPIRone 10 mg tablet Commonly known as:  BUSPAR Take 1 Tab by mouth three (3) times daily. For anxiety  
  
 cetirizine 10 mg tablet Commonly known as:  ZYRTEC Take 10 mg by mouth nightly. chlorpheniramine-HYDROcodone 10-8 mg/5 mL suspension Commonly known as:  Florestine Bae Take 5 mL by mouth every twelve (12) hours as needed for Cough. Max Daily Amount: 10 mL. cholecalciferol 50,000 unit capsule Commonly known as:  VITAMIN D3 Take 1 Cap by mouth Every Thursday. loratadine 10 mg tablet Commonly known as:  CLARITIN  
TAKE 1 TABLET BY MOUTH EVERY DAY  
  
 losartan 25 mg tablet Commonly known as:  COZAAR Take 25 mg by mouth daily. Patient didn't recognize medication name - Rx Query states filled 2/18 #30 for 30 day supply  
  
 metFORMIN 1,000 mg tablet Commonly known as:  GLUCOPHAGE  
 Take 1,000 mg by mouth two (2) times daily (with meals). Patient unsure of dose or frequency - Rx Query states filled 2/18 #60 for 30 day supply  
  
 metoprolol tartrate 25 mg tablet Commonly known as:  LOPRESSOR Take 25 mg by mouth daily. mirtazapine 30 mg tablet Commonly known as:  Shraddha Zoran Take 1 Tab by mouth nightly. For appetite, sleep, and anxiety  
  
 naproxen 500 mg tablet Commonly known as:  NAPROSYN Take 1 Tab by mouth every twelve (12) hours as needed for Pain. NovoLIN N NPH U-100 Insulin 100 unit/mL injection Generic drug:  insulin NPH INJECT 20 UNITS SUBCUTANEOUSLY TWICE A DAY  
  
 omeprazole 20 mg capsule Commonly known as:  PRILOSEC Take 20 mg by mouth daily. oxyCODONE 5 mg capsule Commonly known as:  OXYIR Take 1 Cap by mouth every four (4) hours as needed. Max Daily Amount: 30 mg. PROAIR HFA 90 mcg/actuation inhaler Generic drug:  albuterol Take 2 Puffs by inhalation every four (4) hours as needed for Shortness of Breath. Usually uses at least once daily. sulfacetamide 10 % ophthalmic solution Commonly known as:  BLEPH-10 Administer 1 Drop to left eye every three (3) hours. SYMBICORT 80-4.5 mcg/actuation Hfaa Generic drug:  budesonide-formoterol Take 2 Puffs by inhalation two (2) times a day. ZOFRAN ODT 4 mg disintegrating tablet Generic drug:  ondansetron Take 4 mg by mouth every six (6) hours as needed for Nausea. Prescriptions Sent to Pharmacy Refills  
 sulfacetamide (BLEPH-10) 10 % ophthalmic solution 1 Sig: Administer 1 Drop to left eye every three (3) hours. Class: Normal  
 [facility] [address] #: [phone] Route: Left Eye  
 amoxicillin-clavulanate (AUGMENTIN) 875-125 mg per tablet 0 Sig: Take 1 Tab by mouth every twelve (12) hours for 5 days.   
 Class: Normal  
 Pharmacy: [facility], [address] [place] [address] Ph #: [phone] Route: Oral  
 mirtazapine (REMERON) 30 mg tablet 6 Sig: Take 1 Tab by mouth nightly. For appetite, sleep, and anxiety Class: Normal  
 Pharmacy: [address] Ph #: [phone] Route: Oral  
 busPIRone (BUSPAR) 10 mg tablet 3 Sig: Take 1 Tab by mouth three (3) times daily. For anxiety Class: Normal  
 Pharmacy: [address] Ph #: [phone] Route: Oral  
  
We Performed the Following REFERRAL TO PAIN CLINIC [HNX75 Custom] To-Do List   
 05/08/2018 Imaging:  XR CHEST PA LAT Referral Information Referral ID Referred By Referred To  
  
 [id number] JOSE HUGGINS MD   
   [address] Phone: [phone] Fax: [phone] Visits Status Start Date End Date 1 New Request 5/8/18 5/8/19 If your referral has a status of pending review or denied, additional information will be sent to support the outcome of this decision. Introducing [facility] & HEALTH SERVICES! [employer] introduces [employer] patient portal. Now you can access parts of your medical record, email your doctor's office, and request medication refills online. 1. In your internet browser, go to https://[employer]. [employer] 2. Click on the First Time User? Click Here link in the Sign In box. You will see the New Member Sign Up page. 3. Enter your [employer] Access Code exactly as it appears below. You will not need to use this code after youve completed the sign-up process. If you do not sign up before the expiration date, you must request a new code. · [employer] Access Code: [email or web address] Expires: 8/6/2018 10:52 AM 
 
4. Enter the last four digits of your Social Security Number (xxxx) and Date of Birth (mm/dd/yyyy) as indicated and click Submit. You will be taken to the next sign-up page. 5. Create a [employer] ID. This will be your [employer] login ID and cannot be changed, so think of one that is secure and easy to remember. 6. Create a [employer] password. You can change your password at any time. 7. Enter your Password Reset Question and Answer. This can be used at a later time if you forget your password. 8. Enter your e-mail address. You will receive e-mail notification when new information is available in [address] 9. Click Sign Up. You can now view and download portions of your medical record. 10. Click the Download Summary menu link to download a portable copy of your medical information. If you have questions, please visit the Frequently Asked Questions section of the [employer] website. Remember, [employer] is NOT to be used for urgent needs. For medical emergencies, dial 911. Now available from your iPhone and Android! Please provide this summary of care documentation to your next provider. Your primary care clinician is listed as [place]. If you have any questions after today's visit, please call [phone].

## 2018-05-08 NOTE — PROGRESS NOTES
THE FACE  [facility] [place], operated [facility]    Name and Identification: Terri Rivas     The date of the in-person visit with physician: 5/8/2018     The visit was related to (completely or in part) the medical condition for which the patient needs home health services:   Wound L leg      The services requested:   Nursing--wound care      The services requested are medically necessary and support the need for the requested home health services. My clinical findings support the need for skilled nursing and physical therapy services, which are medically necessary, because: The patient has a new or changed wound requiring wound care. The patient is homebound. she requires considerable and taxing effort to leave her residence for medical reasons or [employer] services or infrequently or of short duration for other reasons). Specifically:     Needs help of another person to leave home. Needs special transport.       The patient is under a physicians care and the in-person visit was conducted by the certifying physician or a NPP (nurse practitioner or physician assistant) and meets the requirements for a face to face encounter      The physicians signature  Date the narrative is signed: 5/8/2018

## 2018-05-08 NOTE — PROGRESS NOTES
SUBJECTIVE  Ms. Sandro Chiang presents today acutely for     Chief Complaint   Patient presents with    Allergies     pt c/o coughing up mucous, congestion (pt c/o symptom over a year); pt wants to get a chest x ray    Decreased Appetite     pt states that she dont have much of an appetiate and that she is loosing weight     Insect Bite     pt concerned of a bug bite on her L leg        She is having runny nose, watery eyes. L eye is red and draining \"white stuff\". Crusted shut in the morning. \"That just started. \"     Hacking and coughing. \"At night it is choking me. \"  Thick and clear. Uses nebulizer and symbicort. Has mucinex. No appetite. \"I have lost 10 lb. \"      \"I be depressed. I don't feel well. I have to make myself do things. \"  She is anxious \"all the time. \"  Trouble sleeping at night. She used to be on benzodiazepines, when she was seeing a psychiatrist \"at the [facility]\"; office closed down. Dr. Flores Figures showed up for an appointment and they was gone. \"  Now not seeing a psychiatrist.     \"I need my pain medicine. \"  Has chronic pain and has been on oxycodone. Hasn't yet done labs. Does have a history of rheumatoid arthritis. Past Medical History:   Diagnosis Date    Anxiety     Asthma     COPD     Diabetes ([facility] [address]     Dyspepsia and other specified disorders of function of stomach     Hemorrhoids     Hypertension     Rheumatoid arthritis ([facility] [address] 5/8/2018       SH: She reports that she has been smoking. She has smoked for the past 38.00 years. She has never used smokeless tobacco. She reports that she drinks about 1.0 oz of alcohol per week  She reports that she does not use illicit drugs. ROS: Complete review of systems was performed and is otherwise unremarkable except as noted elsewhere.      OBJECTIVE  Visit Vitals    BP (!) 144/93 (BP 1 Location: Left arm, BP Patient Position: Sitting)    Pulse 96    Temp 98 °F (36.7 °C) (Oral)    Resp 18    Ht 5' 5\" (1.651 m)    Wt 125 lb (56.7 kg)    SpO2 98%    BMI 20.8 kg/m2     Gen: Pleasant 74 y.o.  female in NAD.   HEENT: PERRLA. EOMI. OP moist and pink.  Neck: Supple.  No LAD.  HEART: RRR, No M/G/R.   LUNGS: Coarse upper airway sounds. No W/R.   ABDOMEN: S, NT, ND, BS+.   EXTREMITIES: Warm. No C/C/E. MUSCULOSKELETAL: Normal ROM, muscle strength 5/5 all groups. NEURO: Alert and oriented x 3.  Cranial nerves grossly intact.  No focal sensory or motor deficits noted. SKIN: Warm. Dry. She has a round 1 cm scabbed lesion L lateral shin, with redness and tenderness surrounding it for another 1 cm. ASSESSMENT / PLAN    ICD-10-CM ICD-9-CM    1. Cough: Ongoing, worsening. Productive. R05 786.2 XR CHEST PA LAT      amoxicillin-clavulanate (AUGMENTIN) 875-125 mg per tablet   2. Conjunctivitis of left eye, unspecified conjunctivitis type H10.9 372.30 sulfacetamide (BLEPH-10) 10 % ophthalmic solution   3. Depression, unspecified depression type F32.9 311 mirtazapine (REMERON) 30 mg tablet   4. Poor appetite R63.0 783.0 mirtazapine (REMERON) 30 mg tablet   5. Other chronic pain G89.29 338.29 REFERRAL TO PAIN CLINIC      oxyCODONE (OXYIR) 5 mg capsule   6. Rheumatoid arthritis, involving unspecified site, unspecified rheumatoid factor presence ([facility]) M06.9 714.0 REFERRAL TO PAIN CLINIC      REFERRAL TO RHEUMATOLOGY      oxyCODONE (OXYIR) 5 mg capsule   7. Wound of left lower extremity, initial encounter S81.802A 894.0 [facility]--wound care. 8. Anxiety F41.9 300.00 mirtazapine (REMERON) 30 mg tablet      busPIRone (BUSPAR) 10 mg tablet     I have reviewed with the patient details of the assessment and plan and all questions were answered. Relevant patient education was performed. Follow-up Disposition: Keep Heather appt.

## 2018-05-08 NOTE — TELEPHONE ENCOUNTER
#[phone] Sylvia [facility] needs clarification on wound care orders.  Please call back today

## 2018-05-09 ENCOUNTER — TELEPHONE (OUTPATIENT)
Dept: SCHEDULING | Facility: HOME HEALTH | Age: 68
End: 2018-05-09

## 2018-05-09 ENCOUNTER — HOME CARE VISIT (OUTPATIENT)
Dept: HOME HEALTH SERVICES | Facility: HOME HEALTH | Age: 68
End: 2018-05-09

## 2018-05-09 RX ORDER — HYDROCODONE BITARTRATE AND HOMATROPINE METHYLBROMIDE 1.5; 5 MG/5ML; MG/5ML
5 SYRUP ORAL 4 TIMES DAILY
Qty: 50 ML | Refills: 0 | Status: SHIPPED | OUTPATIENT
Start: 2018-05-09 | End: 2018-06-25

## 2018-05-09 NOTE — TELEPHONE ENCOUNTER
Spoke with patient stating sister has three surgeries in [place] and is not doing well. Pt is waiting on her daughter to drive her to [place] to be there with her sister as is the only sister she had left as a family. Pt states she will give us a call to see if we can arrange time. She is taking ABX for her wound and it has scabs around it but it opens all the time because it's locations.

## 2018-05-10 ENCOUNTER — TELEPHONE (OUTPATIENT)
Dept: INTERNAL MEDICINE CLINIC | Age: 68
End: 2018-05-10

## 2018-05-10 NOTE — TELEPHONE ENCOUNTER
Pt is requesting a call back in reference to a \"cough syrup\" that was prescribed. Name of Rx is unknown.  P [phone]       Message received & copied from [facility]

## 2018-05-10 NOTE — TELEPHONE ENCOUNTER
Spoke to pt on yesterday regarding script. Pt stated that she will call our office back regarding script.

## 2018-05-11 NOTE — TELEPHONE ENCOUNTER
Identified patient 2 identifiers verified. Patient informed per Dr. Norma Rao to bring in the Tussinex prescription that she was given her last  Visit.

## 2018-05-17 RX ORDER — TRAZODONE HYDROCHLORIDE 50 MG/1
TABLET ORAL
Qty: 90 TAB | Refills: 11 | Status: SHIPPED | OUTPATIENT
Start: 2018-05-17 | End: 2019-03-18 | Stop reason: SDUPTHER

## 2018-06-10 DIAGNOSIS — F41.9 ANXIETY: ICD-10-CM

## 2018-06-10 DIAGNOSIS — R63.0 POOR APPETITE: ICD-10-CM

## 2018-06-10 DIAGNOSIS — F32.A DEPRESSION, UNSPECIFIED DEPRESSION TYPE: ICD-10-CM

## 2018-06-11 RX ORDER — MIRTAZAPINE 30 MG/1
TABLET, FILM COATED ORAL
Qty: 90 TAB | Refills: 6 | Status: SHIPPED | OUTPATIENT
Start: 2018-06-11 | End: 2020-04-03

## 2018-06-25 ENCOUNTER — TELEPHONE (OUTPATIENT)
Dept: INTERNAL MEDICINE CLINIC | Age: 68
End: 2018-06-25

## 2018-06-25 ENCOUNTER — HOSPITAL ENCOUNTER (EMERGENCY)
Age: 68
Discharge: HOME | End: 2018-06-25
Attending: EMERGENCY MEDICINE
Payer: MEDICARE

## 2018-06-25 ENCOUNTER — APPOINTMENT (OUTPATIENT)
Dept: GENERAL RADIOLOGY | Age: 68
End: 2018-06-25
Attending: PHYSICIAN ASSISTANT
Payer: MEDICARE

## 2018-06-25 VITALS
HEIGHT: 65 IN | BODY MASS INDEX: 20.94 KG/M2 | OXYGEN SATURATION: 99 % | RESPIRATION RATE: 16 BRPM | HEART RATE: 85 BPM | WEIGHT: 125.66 LBS | DIASTOLIC BLOOD PRESSURE: 92 MMHG | SYSTOLIC BLOOD PRESSURE: 129 MMHG | TEMPERATURE: 98.3 F

## 2018-06-25 DIAGNOSIS — J42 CHRONIC BRONCHITIS, UNSPECIFIED CHRONIC BRONCHITIS TYPE (HCC): Primary | ICD-10-CM

## 2018-06-25 DIAGNOSIS — M54.10 RADICULOPATHY OF ARM: ICD-10-CM

## 2018-06-25 DIAGNOSIS — R05.9 COUGH: ICD-10-CM

## 2018-06-25 LAB
ALBUMIN SERPL-MCNC: 2.7 G/DL (ref 3.5–5)
ALBUMIN/GLOB SERPL: 0.7 {RATIO} (ref 1.1–2.2)
ALP SERPL-CCNC: 99 U/L (ref 45–117)
ALT SERPL-CCNC: 18 U/L (ref 12–78)
ANION GAP SERPL CALC-SCNC: 9 MMOL/L (ref 5–15)
AST SERPL-CCNC: 17 U/L (ref 15–37)
BASOPHILS # BLD: 0.1 K/UL (ref 0–0.1)
BASOPHILS NFR BLD: 2 % (ref 0–1)
BILIRUB SERPL-MCNC: 0.4 MG/DL (ref 0.2–1)
BUN SERPL-MCNC: 6 MG/DL (ref 6–20)
BUN/CREAT SERPL: 8 (ref 12–20)
CALCIUM SERPL-MCNC: 8.7 MG/DL (ref 8.5–10.1)
CHLORIDE SERPL-SCNC: 108 MMOL/L (ref 97–108)
CO2 SERPL-SCNC: 28 MMOL/L (ref 21–32)
CREAT SERPL-MCNC: 0.8 MG/DL (ref 0.55–1.02)
DIFFERENTIAL METHOD BLD: ABNORMAL
EOSINOPHIL # BLD: 0.2 K/UL (ref 0–0.4)
EOSINOPHIL NFR BLD: 4 % (ref 0–7)
ERYTHROCYTE [DISTWIDTH] IN BLOOD BY AUTOMATED COUNT: 11.9 % (ref 11.5–14.5)
GLOBULIN SER CALC-MCNC: 3.8 G/DL (ref 2–4)
GLUCOSE SERPL-MCNC: 145 MG/DL (ref 65–100)
HCT VFR BLD AUTO: 43.2 % (ref 35–47)
HGB BLD-MCNC: 14.7 G/DL (ref 11.5–16)
IMM GRANULOCYTES # BLD: 0 K/UL (ref 0–0.04)
IMM GRANULOCYTES NFR BLD AUTO: 1 % (ref 0–0.5)
LYMPHOCYTES # BLD: 1.9 K/UL (ref 0.8–3.5)
LYMPHOCYTES NFR BLD: 37 % (ref 12–49)
MCH RBC QN AUTO: 32.5 PG (ref 26–34)
MCHC RBC AUTO-ENTMCNC: 34 G/DL (ref 30–36.5)
MCV RBC AUTO: 95.6 FL (ref 80–99)
MONOCYTES # BLD: 0.5 K/UL (ref 0–1)
MONOCYTES NFR BLD: 10 % (ref 5–13)
NEUTS SEG # BLD: 2.5 K/UL (ref 1.8–8)
NEUTS SEG NFR BLD: 47 % (ref 32–75)
NRBC # BLD: 0 K/UL (ref 0–0.01)
NRBC BLD-RTO: 0 PER 100 WBC
PLATELET # BLD AUTO: 252 K/UL (ref 150–400)
PMV BLD AUTO: 9.9 FL (ref 8.9–12.9)
POTASSIUM SERPL-SCNC: 3.7 MMOL/L (ref 3.5–5.1)
PROT SERPL-MCNC: 6.5 G/DL (ref 6.4–8.2)
RBC # BLD AUTO: 4.52 M/UL (ref 3.8–5.2)
SODIUM SERPL-SCNC: 145 MMOL/L (ref 136–145)
WBC # BLD AUTO: 5.3 K/UL (ref 3.6–11)

## 2018-06-25 PROCEDURE — 36415 COLL VENOUS BLD VENIPUNCTURE: CPT | Performed by: PHYSICIAN ASSISTANT

## 2018-06-25 PROCEDURE — 96372 THER/PROPH/DIAG INJ SC/IM: CPT

## 2018-06-25 PROCEDURE — 74011250636 HC RX REV CODE- 250/636: Performed by: PHYSICIAN ASSISTANT

## 2018-06-25 PROCEDURE — 80053 COMPREHEN METABOLIC PANEL: CPT | Performed by: PHYSICIAN ASSISTANT

## 2018-06-25 PROCEDURE — 99282 EMERGENCY DEPT VISIT SF MDM: CPT

## 2018-06-25 PROCEDURE — 85025 COMPLETE CBC W/AUTO DIFF WBC: CPT | Performed by: PHYSICIAN ASSISTANT

## 2018-06-25 PROCEDURE — 71046 X-RAY EXAM CHEST 2 VIEWS: CPT

## 2018-06-25 RX ORDER — DEXAMETHASONE SODIUM PHOSPHATE 100 MG/10ML
10 INJECTION INTRAMUSCULAR; INTRAVENOUS
Status: COMPLETED | OUTPATIENT
Start: 2018-06-25 | End: 2018-06-25

## 2018-06-25 RX ORDER — METHYLPREDNISOLONE 4 MG/1
TABLET ORAL
Qty: 1 DOSE PACK | Refills: 0 | Status: SHIPPED | OUTPATIENT
Start: 2018-06-25 | End: 2018-10-29

## 2018-06-25 RX ORDER — HYDROCODONE BITARTRATE AND HOMATROPINE METHYLBROMIDE 1.5; 5 MG/5ML; MG/5ML
5 SYRUP ORAL
Qty: 25 ML | Refills: 0 | Status: SHIPPED | OUTPATIENT
Start: 2018-06-25 | End: 2018-06-29

## 2018-06-25 RX ADMIN — DEXAMETHASONE SODIUM PHOSPHATE 10 MG: 10 INJECTION INTRAMUSCULAR; INTRAVENOUS at 16:03

## 2018-06-25 NOTE — ED NOTES
Pt presents to the ED with c/o cough x6 months. Pt reports feeling weak and tired a lot. Pt reports losing weight over the past year but has been seeing her primary care. Pt reports intermittent tingling and numbness in her right arm for 2 months. Pt reports pain in her neck and upper back. Pt reports she has been diagnosed with arthritis. Pt denies SOB and chest pain. Pt reports numbness in her feet. Pt reports hx of COPD. Pt is alert and oriented. Pt skin is warm and dry. Pt is ambulatory independently. Emergency Department Nursing Plan of Care       The Nursing Plan of Care is developed from the Nursing assessment and Emergency Department Attending provider initial evaluation. The plan of care may be reviewed in the ED Provider note.     The Plan of Care was developed with the following considerations:   Patient / Family readiness to learn indicated by:verbalized understanding  Persons(s) to be included in education: patient  Barriers to Learning/Limitations:No    Signed     Ronnie Collazo    6/25/2018   2:33 PM

## 2018-06-25 NOTE — ED PROVIDER NOTES
EMERGENCY DEPARTMENT HISTORY AND PHYSICAL EXAM    Date: 6/25/2018  Patient Name: Tremayne Christianson    History of Presenting Illness     Chief Complaint   Patient presents with    Numbness    Cough         History Provided By: Patient    HPI: Tremayne Christianson is a 74 y.o. female with a PMH of COPD, HTN, DM, asthma, RA, anxiety who presents with chronic cough for at least 6mo. Pt states she has seen her PCP for it and is supposed to see him Friday for them to set up f/u with pulmonary, however, pt states she came today because \"I just don't feel good, I'm feeling weak and tired. \"  Pt also c/o intermittent tingling to the R arm and sometimes feet x 2mo. Pt denies any injury or trauma, no CP, no SOB. Pt states she has been using inhalers as needed. PCP: Gloria Joe MD    Current Outpatient Prescriptions   Medication Sig Dispense Refill    HYDROcodone-homatropine (HYCODAN) 5-1.5 mg/5 mL (5 mL) syrup Take 5 mL by mouth nightly as needed. Max Daily Amount: 5 mL. 25 mL 0    methylPREDNISolone (MEDROL, STERLING,) 4 mg tablet Take as instructed 1 Dose Pack 0    sulfacetamide (BLEPH-10) 10 % ophthalmic solution Administer 1 Drop to left eye every three (3) hours. 1 Bottle 1    albuterol-ipratropium (DUO-NEB) 2.5 mg-0.5 mg/3 ml nebu USE 1 VIAL VIA NEBULIZER EVERY 4 HOURS AS NEEDED 90 mL 1    loratadine (CLARITIN) 10 mg tablet TAKE 1 TABLET BY MOUTH EVERY DAY 90 Tab 3    BD INSULIN SYRINGE ULTRA-FINE 1 mL 31 gauge x 5/16 syrg USE AS DIRECTED TWICE A  Syringe 0    NOVOLIN N NPH U-100 INSULIN 100 unit/mL injection INJECT 20 UNITS SUBCUTANEOUSLY TWICE A DAY 40 mL 2    ondansetron (ZOFRAN ODT) 4 mg disintegrating tablet Take 4 mg by mouth every six (6) hours as needed for Nausea.  Cholecalciferol, Vitamin D3, 50,000 unit cap Take 1 Cap by mouth Every Thursday.  omeprazole (PRILOSEC) 20 mg capsule Take 20 mg by mouth daily.       metFORMIN (GLUCOPHAGE) 1,000 mg tablet Take 1,000 mg by mouth two (2) times daily (with meals). Patient unsure of dose or frequency - Rx Query states filled 2/18 #60 for 30 day supply      PROAIR HFA 90 mcg/actuation inhaler Take 2 Puffs by inhalation every four (4) hours as needed for Shortness of Breath. Usually uses at least once daily.  SYMBICORT 80-4.5 mcg/actuation HFAA inhaler Take 2 Puffs by inhalation two (2) times a day.  metoprolol (LOPRESSOR) 25 mg tablet Take 25 mg by mouth daily. 6    mirtazapine (REMERON) 30 mg tablet TAKE 1 TABLET BY MOUTH EVERY DAY AT BEDTIME FOR APPETITE SLEEP AND ANXIETY 90 Tab 6    traZODone (DESYREL) 50 mg tablet TAKE 1 TABLET BY MOUTH EVERY NIGHT AT BEDTIME 90 Tab 11    busPIRone (BUSPAR) 10 mg tablet Take 1 Tab by mouth three (3) times daily. For anxiety 90 Tab 3    naproxen (NAPROSYN) 500 mg tablet Take 1 Tab by mouth every twelve (12) hours as needed for Pain. 20 Tab 0    cetirizine (ZYRTEC) 10 mg tablet Take 10 mg by mouth nightly.  losartan (COZAAR) 25 mg tablet Take 25 mg by mouth daily.  Patient didn't recognize medication name - Rx Query states filled 2/18 #30 for 30 day supply         Past History     Past Medical History:  Past Medical History:   Diagnosis Date    Anxiety     Asthma     COPD     Diabetes ([facility] [address]     Dyspepsia and other specified disorders of function of stomach     Hemorrhoids     Hypertension     Rheumatoid arthritis ([facility] [address] 5/8/2018       Past Surgical History:  Past Surgical History:   Procedure Laterality Date    HX BREAST BIOPSY Left     benign    HX GYN      Thermal Ablation    HX HEART CATHETERIZATION      exploration    HX LAPAROTOMY      HX ORTHOPAEDIC      right knee    HX OTHER SURGICAL      Colonoscopy    HX TUBAL LIGATION         Family History:  Family History   Problem Relation Age of Onset    Heart Disease Father     Cancer Sister        Social History:  Social History   Substance Use Topics    Smoking status: Current Every Day Smoker     Packs/day: 0.25     Years: 38.00    Smokeless tobacco: Never Used    Alcohol use 1.0 oz/week     2 Glasses of wine per week      Comment: occasionally       Allergies: Allergies   Allergen Reactions    Tylenol-Codeine #3 [Acetaminophen-Codeine] Itching     Patient states she cannot tolerate APAP         Review of Systems   Review of Systems   Constitutional: Negative for fever. Respiratory: Positive for cough. Negative for shortness of breath, wheezing and stridor. Cardiovascular: Negative for chest pain. Gastrointestinal: Negative for nausea and vomiting. Neurological: Positive for numbness. Negative for seizures and speech difficulty. All other systems reviewed and are negative. Physical Exam     Vitals:    06/25/18 1415   BP: (!) 129/92   Pulse: 85   Resp: 16   Temp: 98.3 °F (36.8 °C)   SpO2: 99%   Weight: 57 kg (125 lb 10.6 oz)   Height: 5' 5\" (1.651 m)     Physical Exam   Constitutional: She is oriented to person, place, and time. She appears well-developed and well-nourished. No distress. HENT:   Head: Normocephalic and atraumatic. Eyes: Conjunctivae are normal.   Neck: Normal range of motion. Cardiovascular: Normal rate, regular rhythm, normal heart sounds, intact distal pulses and normal pulses. Pulses:       Radial pulses are 2+ on the right side, and 2+ on the left side. Pulmonary/Chest: Effort normal. No respiratory distress. She has no wheezes. She has rhonchi in the right upper field, the right middle field, the left upper field and the left middle field. She has no rales. Neurological: She is alert and oriented to person, place, and time. Skin: Skin is warm and dry. Psychiatric: She has a normal mood and affect. Her behavior is normal. Judgment and thought content normal.   Nursing note and vitals reviewed.   at 4:49 PM    Diagnostic Study Results     Labs -     Recent Results (from the past 12 hour(s))   METABOLIC PANEL, COMPREHENSIVE    Collection Time: 06/25/18  3:00 PM   Result Value Ref Range Sodium 145 136 - 145 mmol/L    Potassium 3.7 3.5 - 5.1 mmol/L    Chloride 108 97 - 108 mmol/L    CO2 28 21 - 32 mmol/L    Anion gap 9 5 - 15 mmol/L    Glucose 145 (H) 65 - 100 mg/dL    BUN 6 6 - 20 MG/DL    Creatinine 0.80 0.55 - 1.02 MG/DL    BUN/Creatinine ratio 8 (L) 12 - 20      GFR est AA >60 >60 ml/min/1.73m2    GFR est non-AA >60 >60 ml/min/1.73m2    Calcium 8.7 8.5 - 10.1 MG/DL    Bilirubin, total 0.4 0.2 - 1.0 MG/DL    ALT (SGPT) 18 12 - 78 U/L    AST (SGOT) 17 15 - 37 U/L    Alk. phosphatase 99 45 - 117 U/L    Protein, total 6.5 6.4 - 8.2 g/dL    Albumin 2.7 (L) 3.5 - 5.0 g/dL    Globulin 3.8 2.0 - 4.0 g/dL    A-G Ratio 0.7 (L) 1.1 - 2.2     CBC WITH AUTOMATED DIFF    Collection Time: 06/25/18  3:00 PM   Result Value Ref Range    WBC 5.3 3.6 - 11.0 K/uL    RBC 4.52 3.80 - 5.20 M/uL    HGB 14.7 11.5 - 16.0 g/dL    HCT 43.2 35.0 - 47.0 %    MCV 95.6 80.0 - 99.0 FL    MCH 32.5 26.0 - 34.0 PG    MCHC 34.0 30.0 - 36.5 g/dL    RDW 11.9 11.5 - 14.5 %    PLATELET 913 997 - 370 K/uL    MPV 9.9 8.9 - 12.9 FL    NRBC 0.0 0  WBC    ABSOLUTE NRBC 0.00 0.00 - 0.01 K/uL    NEUTROPHILS 47 32 - 75 %    LYMPHOCYTES 37 12 - 49 %    MONOCYTES 10 5 - 13 %    EOSINOPHILS 4 0 - 7 %    BASOPHILS 2 (H) 0 - 1 %    IMMATURE GRANULOCYTES 1 (H) 0.0 - 0.5 %    ABS. NEUTROPHILS 2.5 1.8 - 8.0 K/UL    ABS. LYMPHOCYTES 1.9 0.8 - 3.5 K/UL    ABS. MONOCYTES 0.5 0.0 - 1.0 K/UL    ABS. EOSINOPHILS 0.2 0.0 - 0.4 K/UL    ABS. BASOPHILS 0.1 0.0 - 0.1 K/UL    ABS. IMM. GRANS. 0.0 0.00 - 0.04 K/UL    DF AUTOMATED         Radiologic Studies -   XR CHEST PA LAT   Final Result        CT Results  (Last 48 hours)    None        CXR Results  (Last 48 hours)               06/25/18 1515  XR CHEST PA LAT Final result    Impression:  IMPRESSION: No acute findings. Narrative:  EXAM:  XR CHEST PA LAT       INDICATION:   cough worse x 6mo       COMPARISON: Chest x-ray 5/8/2018. Rakel Crews        FINDINGS: PA and lateral radiographs of the chest demonstrate clear lungs. The   cardiac and mediastinal contours and pulmonary vascularity are normal.    Atherosclerotic calcifications affect the aortic arch and the thoracic aorta is   tortuous. The chest wall structures and visualized upper abdomen show no acute   findings with incidental note of degenerative spine and shoulder changes. Medical Decision Making   I am the first provider for this patient. I reviewed the vital signs, available nursing notes, past medical history, past surgical history, family history and social history. Vital Signs-Reviewed the patient's vital signs. Records Reviewed: Old Medical Records    ED Course:   Advised pt that it appears she is being worked up for these symptoms currently, but we can do CXR and labs today to see if anything abnormal.  Pt agreeable to plan. Disposition:  Discharged    DISCHARGE NOTE:   353  PM      Care plan outlined and precautions discussed. Pt requesting cough meds she got last time she was here because she states pharmacy won't fill tussionex written for her by her PCP and she needs something to help at night with the cough. Advised pt she will only get a small amount and she needs to discuss with her PCP at her appt on Friday. Patient has no new complaints, changes, or physical findings. Results of labs and CXR were reviewed with the patient. All medications were reviewed with the patient; will d/c home. All of pt's questions and concerns were addressed. Patient was instructed and agrees to follow up with PCP, as well as to return to the ED upon further deterioration. Patient is ready to go home.     Follow-up Information     Follow up With Details Comments Contact Info    Shahida Murillo MD Go on 6/29/2018 as scheduled [address] [facility]  [phone]            Discharge Medication List as of 6/25/2018  3:53 PM      START taking these medications    Details methylPREDNISolone (MEDROL, STERLING,) 4 mg tablet Take as instructed, Print, Disp-1 Dose Pack, R-0         CONTINUE these medications which have CHANGED    Details   HYDROcodone-homatropine (HYCODAN) 5-1.5 mg/5 mL (5 mL) syrup Take 5 mL by mouth nightly as needed. Max Daily Amount: 5 mL. , Print, Disp-25 mL, R-0         CONTINUE these medications which have NOT CHANGED    Details   sulfacetamide (BLEPH-10) 10 % ophthalmic solution Administer 1 Drop to left eye every three (3) hours. , Normal, Disp-1 Bottle, R-1      albuterol-ipratropium (DUO-NEB) 2.5 mg-0.5 mg/3 ml nebu USE 1 VIAL VIA NEBULIZER EVERY 4 HOURS AS NEEDED, Normal, Disp-90 mL, R-1      loratadine (CLARITIN) 10 mg tablet TAKE 1 TABLET BY MOUTH EVERY DAY, Normal, Disp-90 Tab, R-3      BD INSULIN SYRINGE ULTRA-FINE 1 mL 31 gauge x 5/16 syrg USE AS DIRECTED TWICE A DAY, Normal, Disp-200 Syringe, R-0      NOVOLIN N NPH U-100 INSULIN 100 unit/mL injection INJECT 20 UNITS SUBCUTANEOUSLY TWICE A DAY, Normal, Disp-40 mL, R-2      ondansetron (ZOFRAN ODT) 4 mg disintegrating tablet Take 4 mg by mouth every six (6) hours as needed for Nausea., Historical Med      Cholecalciferol, Vitamin D3, 50,000 unit cap Take 1 Cap by mouth Every Thursday., Historical Med      omeprazole (PRILOSEC) 20 mg capsule Take 20 mg by mouth daily. , Historical Med      metFORMIN (GLUCOPHAGE) 1,000 mg tablet Take 1,000 mg by mouth two (2) times daily (with meals). Patient unsure of dose or frequency - Rx Query states filled 2/18 #60 for 30 day supply, Historical Med      PROAIR HFA 90 mcg/actuation inhaler Take 2 Puffs by inhalation every four (4) hours as needed for Shortness of Breath. Usually uses at least once daily. , Historical Med      SYMBICORT 80-4.5 mcg/actuation HFAA inhaler Take 2 Puffs by inhalation two (2) times a day., Historical Med      metoprolol (LOPRESSOR) 25 mg tablet Take 25 mg by mouth daily. , Historical Med, R-6      mirtazapine (REMERON) 30 mg tablet TAKE 1 TABLET BY MOUTH EVERY DAY AT BEDTIME FOR APPETITE SLEEP AND ANXIETY, Normal**Patient requests 90 days supply**Disp-90 Tab, R-6      traZODone (DESYREL) 50 mg tablet TAKE 1 TABLET BY MOUTH EVERY NIGHT AT BEDTIME, Normal**Patient requests 90 days supply**Disp-90 Tab, R-11      busPIRone (BUSPAR) 10 mg tablet Take 1 Tab by mouth three (3) times daily. For anxiety, Normal, Disp-90 Tab, R-3      naproxen (NAPROSYN) 500 mg tablet Take 1 Tab by mouth every twelve (12) hours as needed for Pain., Normal, Disp-20 Tab, R-0      cetirizine (ZYRTEC) 10 mg tablet Take 10 mg by mouth nightly., Historical Med      losartan (COZAAR) 25 mg tablet Take 25 mg by mouth daily. Patient didn't recognize medication name - Rx Query states filled 2/18 #30 for 30 day supply, Historical Med         STOP taking these medications       oxyCODONE (OXYIR) 5 mg capsule Comments:   Reason for Stopping:               Provider Notes (Medical Decision Making):   DDX: cervical radiculopathy, chronic bronchitis, URI, PNA    Procedures        Diagnosis     Clinical Impression:   1. Chronic bronchitis, unspecified chronic bronchitis type (HCC)    2. Radiculopathy of arm    3.  Cough

## 2018-06-25 NOTE — ED TRIAGE NOTES
C/o RUE numbness/tingling since fall 2 years ago,worsening in frequency in recent months  C/o also prod cough x 6 months, current smoker

## 2018-06-25 NOTE — ED NOTES
Reviewed discharge instructions, follow up information and prescriptions with patient. Patient ambulatory independently out of ED. Discharged home with family. Declined work excuse.

## 2018-06-25 NOTE — DISCHARGE INSTRUCTIONS
Bronchitis: Care Instructions  Your Care Instructions    Bronchitis is inflammation of the bronchial tubes, which carry air to the lungs. The tubes swell and produce mucus, or phlegm. The mucus and inflamed bronchial tubes make you cough. You may have trouble breathing. Most cases of bronchitis are caused by viruses like those that cause colds. Antibiotics usually do not help and they may be harmful. Bronchitis usually develops rapidly and lasts about 2 to 3 weeks in otherwise healthy people. Follow-up care is a key part of your treatment and safety. Be sure to make and go to all appointments, and call your doctor if you are having problems. It's also a good idea to know your test results and keep a list of the medicines you take. How can you care for yourself at home? · Take all medicines exactly as prescribed. Call your doctor if you think you are having a problem with your medicine. · Get some extra rest.  · Take an over-the-counter pain medicine, such as acetaminophen (Tylenol), ibuprofen (Advil, Motrin), or naproxen (Aleve) to reduce fever and relieve body aches. Read and follow all instructions on the label. · Do not take two or more pain medicines at the same time unless the doctor told you to. Many pain medicines have acetaminophen, which is Tylenol. Too much acetaminophen (Tylenol) can be harmful. · Take an over-the-counter cough medicine that contains dextromethorphan to help quiet a dry, hacking cough so that you can sleep. Avoid cough medicines that have more than one active ingredient. Read and follow all instructions on the label. · Breathe moist air from a humidifier, hot shower, or sink filled with hot water. The heat and moisture will thin mucus so you can cough it out. · Do not smoke. Smoking can make bronchitis worse. If you need help quitting, talk to your doctor about stop-smoking programs and medicines. These can increase your chances of quitting for good.   When should you call for help? Call 911 anytime you think you may need emergency care. For example, call if:  ? · You have severe trouble breathing. ?Call your doctor now or seek immediate medical care if:  ? · You have new or worse trouble breathing. ? · You cough up dark brown or bloody mucus (sputum). ? · You have a new or higher fever. ? · You have a new rash. ? Watch closely for changes in your health, and be sure to contact your doctor if:  ? · You cough more deeply or more often, especially if you notice more mucus or a change in the color of your mucus. ? · You are not getting better as expected. Where can you learn more? Go to http://shakir-ros.info/. Enter H333 in the search box to learn more about \"Bronchitis: Care Instructions. \"  Current as of: May 12, 2017  Content Version: 11.4  © 6569-3644 [employer]. Care instructions adapted under license by [employer] (which disclaims liability or warranty for this information). If you have questions about a medical condition or this instruction, always ask your healthcare professional. [place] [address] any warranty or liability for your use of this information. Pinched Nerve in the Neck: Care Instructions  Your Care Instructions  A pinched nerve in the neck happens when a vertebra or disc in the upper part of your spine is damaged. This damage can happen because of an injury. Or it can just happen with age. The changes caused by the damage may put pressure on a nearby nerve root, pinching it. This causes symptoms such as sharp pain in your neck, shoulder, arm, hand, or back. You may also have tingling or numbness. Sometimes it makes your arm weaker. The symptoms are usually worse when you turn your head or strain your neck. For many people, the symptoms get better over time and finally go away. Early treatment usually includes medicines for pain and swelling.  Sometimes physical therapy and special exercises may help. Follow-up care is a key part of your treatment and safety. Be sure to make and go to all appointments, and call your doctor if you are having problems. It's also a good idea to know your test results and keep a list of the medicines you take. How can you care for yourself at home? · Be safe with medicines. Read and follow all instructions on the label. ¨ If the doctor gave you a prescription medicine for pain, take it as prescribed. ¨ If you are not taking a prescription pain medicine, ask your doctor if you can take an over-the-counter medicine. · Try using a heating pad on a low or medium setting for 15 to 20 minutes every 2 or 3 hours. Try a warm shower in place of one session with the heating pad. You can also buy single-use heat wraps that last up to 8 hours. · You can also try an ice pack for 10 to 15 minutes every 2 to 3 hours. There isn't strong evidence that either heat or ice will help. But you can try them to see if they help you. · Don't spend too long in one position. Take short breaks to move around and change positions. · Wear a seat belt and shoulder harness when you are in a car. · Sleep with a pillow under your head and neck that keeps your neck straight. · If you were given a neck brace (cervical collar) to limit neck motion, wear it as instructed for as many days as your doctor tells you to. Do not wear it longer than you were told to. Wearing a brace for too long can lead to neck stiffness and can weaken the neck muscles. · Follow your doctor's instructions for gentle neck-stretching exercises. · Do not smoke. Smoking can slow healing of your discs. If you need help quitting, talk to your doctor about stop-smoking programs and medicines. These can increase your chances of quitting for good. · Avoid strenuous work or exercise until your doctor says it is okay. When should you call for help? Call 911 anytime you think you may need emergency care.  For example, call if:  ? · You are unable to move an arm or a leg at all. ?Call your doctor now or seek immediate medical care if:  ? · You have new or worse symptoms in your arms, legs, chest, belly, or buttocks. Symptoms may include:  ¨ Numbness or tingling. ¨ Weakness. ¨ Pain. ? · You lose bladder or bowel control. ? Watch closely for changes in your health, and be sure to contact your doctor if:  ? · You are not getting better as expected. Where can you learn more? Go to http://shakir-ros.info/. Enter K655 in the search box to learn more about \"Pinched Nerve in the Neck: Care Instructions. \"  Current as of: March 21, 2017  Content Version: 11.4  © 9011-8112 [employer]. Care instructions adapted under license by [employer] (which disclaims liability or warranty for this information). If you have questions about a medical condition or this instruction, always ask your healthcare professional. [address] any warranty or liability for your use of this information.

## 2018-06-29 ENCOUNTER — OFFICE VISIT (OUTPATIENT)
Dept: INTERNAL MEDICINE CLINIC | Age: 68
End: 2018-06-29

## 2018-06-29 VITALS
HEART RATE: 71 BPM | DIASTOLIC BLOOD PRESSURE: 92 MMHG | HEIGHT: 65 IN | WEIGHT: 129 LBS | TEMPERATURE: 98.3 F | RESPIRATION RATE: 16 BRPM | BODY MASS INDEX: 21.49 KG/M2 | OXYGEN SATURATION: 97 % | SYSTOLIC BLOOD PRESSURE: 156 MMHG

## 2018-06-29 DIAGNOSIS — J45.909 ASTHMA, UNSPECIFIED ASTHMA SEVERITY, UNSPECIFIED WHETHER COMPLICATED, UNSPECIFIED WHETHER PERSISTENT: ICD-10-CM

## 2018-06-29 DIAGNOSIS — R05.9 COUGH: ICD-10-CM

## 2018-06-29 DIAGNOSIS — I10 ESSENTIAL HYPERTENSION: ICD-10-CM

## 2018-06-29 DIAGNOSIS — Z79.4 TYPE 2 DIABETES MELLITUS WITHOUT COMPLICATION, WITH LONG-TERM CURRENT USE OF INSULIN (HCC): ICD-10-CM

## 2018-06-29 DIAGNOSIS — G89.29 OTHER CHRONIC PAIN: ICD-10-CM

## 2018-06-29 DIAGNOSIS — H10.9 CONJUNCTIVITIS OF LEFT EYE, UNSPECIFIED CONJUNCTIVITIS TYPE: ICD-10-CM

## 2018-06-29 DIAGNOSIS — F41.9 ANXIETY: ICD-10-CM

## 2018-06-29 DIAGNOSIS — J44.9 CHRONIC OBSTRUCTIVE PULMONARY DISEASE, UNSPECIFIED COPD TYPE (HCC): ICD-10-CM

## 2018-06-29 DIAGNOSIS — M06.9 RHEUMATOID ARTHRITIS, INVOLVING UNSPECIFIED SITE, UNSPECIFIED RHEUMATOID FACTOR PRESENCE: Primary | ICD-10-CM

## 2018-06-29 DIAGNOSIS — E11.9 TYPE 2 DIABETES MELLITUS WITHOUT COMPLICATION, WITH LONG-TERM CURRENT USE OF INSULIN (HCC): ICD-10-CM

## 2018-06-29 RX ORDER — OXYCODONE HYDROCHLORIDE 5 MG/1
5 CAPSULE ORAL
Qty: 20 CAP | Refills: 0 | Status: SHIPPED | OUTPATIENT
Start: 2018-06-29 | End: 2018-08-09 | Stop reason: SDUPTHER

## 2018-06-29 RX ORDER — ONDANSETRON 4 MG/1
4 TABLET, ORALLY DISINTEGRATING ORAL
Qty: 30 TAB | Refills: 1 | Status: SHIPPED | OUTPATIENT
Start: 2018-06-29 | End: 2018-10-29

## 2018-06-29 RX ORDER — DULOXETIN HYDROCHLORIDE 30 MG/1
CAPSULE, DELAYED RELEASE ORAL
Qty: 90 CAP | Refills: 11 | Status: SHIPPED | OUTPATIENT
Start: 2018-06-29 | End: 2019-03-18 | Stop reason: SDUPTHER

## 2018-06-29 RX ORDER — SULFACETAMIDE SODIUM 100 MG/ML
1 SOLUTION/ DROPS OPHTHALMIC
Qty: 1 BOTTLE | Refills: 1 | Status: SHIPPED | OUTPATIENT
Start: 2018-06-29 | End: 2020-04-03

## 2018-06-29 RX ORDER — LOSARTAN POTASSIUM 50 MG/1
TABLET ORAL
Qty: 90 TAB | Refills: 11 | Status: SHIPPED | OUTPATIENT
Start: 2018-06-29 | End: 2020-04-03

## 2018-06-29 RX ORDER — HYDROCODONE POLISTIREX AND CHLORPHENIRAMINE POLISTIREX 10; 8 MG/5ML; MG/5ML
5 SUSPENSION, EXTENDED RELEASE ORAL
Qty: 115 ML | Refills: 0 | Status: SHIPPED | OUTPATIENT
Start: 2018-06-29 | End: 2018-08-30 | Stop reason: SDUPTHER

## 2018-06-29 RX ORDER — DULOXETIN HYDROCHLORIDE 30 MG/1
30 CAPSULE, DELAYED RELEASE ORAL DAILY
Qty: 30 CAP | Refills: 11 | Status: SHIPPED | OUTPATIENT
Start: 2018-06-29 | End: 2018-06-29 | Stop reason: SDUPTHER

## 2018-06-29 RX ORDER — LOSARTAN POTASSIUM 50 MG/1
50 TABLET ORAL DAILY
Qty: 30 TAB | Refills: 11 | Status: SHIPPED | OUTPATIENT
Start: 2018-06-29 | End: 2018-06-29 | Stop reason: SDUPTHER

## 2018-06-29 RX ORDER — IPRATROPIUM BROMIDE AND ALBUTEROL SULFATE 2.5; .5 MG/3ML; MG/3ML
SOLUTION RESPIRATORY (INHALATION)
Qty: 90 ML | Refills: 1 | Status: SHIPPED | OUTPATIENT
Start: 2018-06-29 | End: 2018-06-29 | Stop reason: SDUPTHER

## 2018-06-29 NOTE — LETTER
10/30/2018 3:09 PM 
 
Ms. Francia Kimbrough [address] Dear Francia Kimbrough: 
 
Please find your most recent results below. Resulted Orders METABOLIC PANEL, COMPREHENSIVE Result Value Ref Range Glucose 138 (H) 65 - 99 mg/dL BUN 7 (L) 8 - 27 mg/dL Creatinine 0.86 0.57 - 1.00 mg/dL GFR est non-AA 71 >59 mL/min/1.73 GFR est AA 81 >59 mL/min/1.73  
 BUN/Creatinine ratio 8 (L) 12 - 28 Sodium 143 134 - 144 mmol/L Potassium 3.6 3.5 - 5.2 mmol/L Chloride 99 96 - 106 mmol/L  
 CO2 21 20 - 29 mmol/L Calcium 8.7 8.7 - 10.3 mg/dL Protein, total 6.9 6.0 - 8.5 g/dL Albumin 4.4 3.6 - 4.8 g/dL GLOBULIN, TOTAL 2.5 1.5 - 4.5 g/dL A-G Ratio 1.8 1.2 - 2.2 Bilirubin, total 1.1 0.0 - 1.2 mg/dL Alk. phosphatase 99 39 - 117 IU/L  
 AST (SGOT) 22 0 - 40 IU/L  
 ALT (SGPT) 15 0 - 32 IU/L Narrative Performed at:  [address]  [id number] : Blaire Padilla MD, Phone:  [phone] LIPID PANEL Result Value Ref Range Cholesterol, total 190 100 - 199 mg/dL Triglyceride 264 (H) 0 - 149 mg/dL HDL Cholesterol 62 >39 mg/dL VLDL, calculated 53 (H) 5 - 40 mg/dL LDL, calculated 75 0 - 99 mg/dL Narrative Performed at:  [address]  [id number] : Blaire Padilla MD, Phone:  [phone] HEMOGLOBIN A1C WITH EAG Result Value Ref Range Hemoglobin A1c 6.5 (H) 4.8 - 5.6 % Comment:  
            Prediabetes: 5.7 - 6.4 Diabetes: >6.4 Glycemic control for adults with diabetes: <7.0 Estimated average glucose 140 mg/dL Narrative Performed at:  [address]  [id number] : Blaire Padilla MD, Phone:  [phone] MICROALBUMIN, UR, RAND Result Value Ref Range Microalbumin, urine 74.6 Not Estab. ug/mL Narrative Performed at:  [address]  [id number] : Tiara Wei MD, Phone:  [phone] RHEUMATOID FACTOR, QL Result Value Ref Range Rheumatoid factor 110.8 (H) 0.0 - 13.9 IU/mL Narrative Performed at:  [address]  [id number] : Tiara Wei MD, Phone:  [phone] SED RATE (ESR) Result Value Ref Range Sed rate (ESR) 11 0 - 40 mm/hr Narrative Performed at:  [address]  [id number] : Tiara Wei MD, Phone:  [phone] RECOMMENDATIONS: 
Work on diet and exercise. Please call me if you have any questions: [phone] Sincerely, 
 
 
Durene Homans, MD

## 2018-06-29 NOTE — PROGRESS NOTES
SUBJECTIVE  Ms. Mary Anne Reyes presents today acutely for the following issues. She is due also for follow up of routine medical issues. She was a longterm patient of Dr. Carlos Sanchez, who retired. Chief Complaint   Patient presents with   [facility] Follow Up     pt here today for hosp f.u fr [facility] b/c she could not sleep due to DJD , and cough    Weight Loss     pt concerned that she is loosing weight and that she has to force hersefl to eat --pt states that she has been feeling off balance     Medication Evaluation     pt wants to dicuss getting insulin pens     Medication Evaluation     pt wants to discuss getting a cheaper cough medication --hydrocodone chlorpheniram tussionex     Medication Evaluation     pt wants to discuss getting a pain mediation     Skin Exam     pt has bumps on her shoulders and back - pt concerned of shingles- pt c.o itching and burning      She was seen in ER on 6/26:   HPI: Mary Anne Reyes is a 74 y.o. female with a PMH of COPD, HTN, DM, asthma, RA, anxiety who presents with chronic cough for at least 6mo. Pt states she has seen her PCP for it and is supposed to see him Friday for them to set up f/u with pulmonary, however, pt states she came today because \"I just don't feel good, I'm feeling weak and tired. \"  Pt also c/o intermittent tingling to the R arm and sometimes feet x 2mo. Pt denies any injury or trauma, no CP, no SOB. Pt states she has been using inhalers as needed. \"I was coughing a lot so she just gave me a little small bottle of cough syrup. \"     Wants refill of tussionex. \"I have COPD. \"  She is still awaiting lung doctor to call her. Has a chronic productive cough. Regarding prior referrals, \"I haven't heard from any of them. \"  She says she is waiting for the various specialists to call her; this appears to be a misunderstanding on her part. I have asked her to call them; we'll reorder referrals.      Weight loss: Now on remeron for this. Wt Readings from Last 3 Encounters:   06/29/18 129 lb (58.5 kg)   06/25/18 125 lb 10.6 oz (57 kg)   05/08/18 125 lb (56.7 kg)       DM: On insulin. \"I think that's doing fine. \"     It appears she was admitted over a year ago for LLQ abdom pain and BRBPR; CT showed diverticulosis with no diverticulitis. Anxiety: We noted last month:  \"I be depressed. I don't feel well. I have to make myself do things. \"  She is anxious \"all the time. \"  Trouble sleeping at night. She used to be on benzodiazepines, when she was seeing a psychiatrist \"at the [facility] place\"; office closed down. Dr. Gui Moyer showed up for an appointment and they was gone. \"  Now not seeing a psychiatrist.    At this time, she is otherwise doing well and has brought no other complaints to my attention today. For a list of the medical issues addressed today, see the assessment and plan below. PMH:   Past Medical History:   Diagnosis Date    Anxiety     Asthma     COPD     [facility] [address]     Dyspepsia and other specified disorders of function of stomach     Hemorrhoids     Hypertension     Rheumatoid arthritis ([facility] [address] 5/8/2018       Past Surgical History:   Procedure Laterality Date    HX BREAST BIOPSY Left     benign    HX GYN      Thermal Ablation    HX HEART CATHETERIZATION      exploration    HX LAPAROTOMY      HX ORTHOPAEDIC      right knee    HX OTHER SURGICAL      Colonoscopy    HX TUBAL LIGATION         All: is allergic to buspirone and tylenol-codeine #3 [acetaminophen-codeine]. Current Outpatient Prescriptions   Medication Sig    albuterol-ipratropium (DUO-NEB) 2.5 mg-0.5 mg/3 ml nebu USE 1 VIAL VIA NEBULIZER EVERY 4 HOURS AS NEEDED    sulfacetamide (BLEPH-10) 10 % ophthalmic solution Administer 1 Drop to left eye every three (3) hours.  ondansetron (ZOFRAN ODT) 4 mg disintegrating tablet Take 1 Tab by mouth every six (6) hours as needed for Nausea.     chlorpheniramine-HYDROcodone (TUSSIONEX) 10-8 mg/5 mL suspension Take 5 mL by mouth every twelve (12) hours as needed for Cough. Max Daily Amount: 10 mL.  oxyCODONE (OXYIR) 5 mg capsule Take 1 Cap by mouth every four (4) hours as needed. Max Daily Amount: 30 mg.    methylPREDNISolone (MEDROL, STERLING,) 4 mg tablet Take as instructed    mirtazapine (REMERON) 30 mg tablet TAKE 1 TABLET BY MOUTH EVERY DAY AT BEDTIME FOR APPETITE SLEEP AND ANXIETY    traZODone (DESYREL) 50 mg tablet TAKE 1 TABLET BY MOUTH EVERY NIGHT AT BEDTIME    loratadine (CLARITIN) 10 mg tablet TAKE 1 TABLET BY MOUTH EVERY DAY    BD INSULIN SYRINGE ULTRA-FINE 1 mL 31 gauge x 5/16 syrg USE AS DIRECTED TWICE A DAY    NOVOLIN N NPH U-100 INSULIN 100 unit/mL injection INJECT 20 UNITS SUBCUTANEOUSLY TWICE A DAY    cetirizine (ZYRTEC) 10 mg tablet Take 10 mg by mouth nightly.  Cholecalciferol, Vitamin D3, 50,000 unit cap Take 1 Cap by mouth Every Thursday.  omeprazole (PRILOSEC) 20 mg capsule Take 20 mg by mouth daily.  metFORMIN (GLUCOPHAGE) 1,000 mg tablet Take 1,000 mg by mouth two (2) times daily (with meals). Patient unsure of dose or frequency - Rx Query states filled 2/18 #60 for 30 day supply    losartan (COZAAR) 25 mg tablet Take 25 mg by mouth daily. Patient didn't recognize medication name - Rx Query states filled 2/18 #30 for 30 day supply    PROAIR HFA 90 mcg/actuation inhaler Take 2 Puffs by inhalation every four (4) hours as needed for Shortness of Breath. Usually uses at least once daily.  SYMBICORT 80-4.5 mcg/actuation HFAA inhaler Take 2 Puffs by inhalation two (2) times a day.  metoprolol (LOPRESSOR) 25 mg tablet Take 25 mg by mouth daily.  busPIRone (BUSPAR) 10 mg tablet Take 1 Tab by mouth three (3) times daily. For anxiety    naproxen (NAPROSYN) 500 mg tablet Take 1 Tab by mouth every twelve (12) hours as needed for Pain. No current facility-administered medications for this visit.         FH: family history includes Cancer in her sister; Heart Disease in her father. SH:  reports that she has been smoking. She has a 9.50 pack-year smoking history. She has never used smokeless tobacco. She reports that she drinks about 1.0 oz of alcohol per week  She reports that she does not use illicit drugs. ROS: See above; Complete ROS otherwise negative. OBJECTIVE:   Vitals:   Visit Vitals    BP (!) 156/92 (BP 1 Location: Left arm, BP Patient Position: Sitting)    Pulse 71    Temp 98.3 °F (36.8 °C) (Oral)    Resp 16    Ht 5' 5\" (1.651 m)    Wt 129 lb (58.5 kg)    SpO2 97%    BMI 21.47 kg/m2      Gen: Pleasant 74 y.o.  female in NAD. HEENT: PERRLA. EOMI. OP moist and pink. Neck: Supple. No LAD. HEART: RRR, No M/G/R.    LUNGS: CTAB No W/R. ABDOMEN: S, NT, ND, BS+. EXTREMITIES: Warm. No C/C/E.  MUSCULOSKELETAL: Normal ROM, muscle strength 5/5 all groups. NEURO: Alert and oriented x 3. Cranial nerves grossly intact. No focal sensory or motor deficits noted. SKIN: Warm. Dry. No rashes or other lesions noted. Lab Results   Component Value Date/Time    Sodium 145 06/25/2018 03:00 PM    Potassium 3.7 06/25/2018 03:00 PM    Chloride 108 06/25/2018 03:00 PM    CO2 28 06/25/2018 03:00 PM    Anion gap 9 06/25/2018 03:00 PM    Glucose 145 (H) 06/25/2018 03:00 PM    BUN 6 06/25/2018 03:00 PM    Creatinine 0.80 06/25/2018 03:00 PM    BUN/Creatinine ratio 8 (L) 06/25/2018 03:00 PM    GFR est AA >60 06/25/2018 03:00 PM    GFR est non-AA >60 06/25/2018 03:00 PM    Calcium 8.7 06/25/2018 03:00 PM    Bilirubin, total 0.4 06/25/2018 03:00 PM    ALT (SGPT) 18 06/25/2018 03:00 PM    AST (SGOT) 17 06/25/2018 03:00 PM    Alk.  phosphatase 99 06/25/2018 03:00 PM    Protein, total 6.5 06/25/2018 03:00 PM    Albumin 2.7 (L) 06/25/2018 03:00 PM    Globulin 3.8 06/25/2018 03:00 PM    A-G Ratio 0.7 (L) 06/25/2018 03:00 PM        Lab Results   Component Value Date/Time    Hemoglobin A1c 7.2 (H) 03/06/2017 03:10 AM       Lab Results   Component Value Date/Time WBC 5.3 06/25/2018 03:00 PM    HGB 14.7 06/25/2018 03:00 PM    HCT 43.2 06/25/2018 03:00 PM    PLATELET 537 49/11/4905 03:00 PM    MCV 95.6 06/25/2018 03:00 PM         ASSESSMENT/ PLAN: Diagnoses and all orders for this visit:    1. Diabetes Mellitus: Due for labs. 2. Hypertension, unspecified type: BP high, but she is in pain. Order losartan. -     METABOLIC PANEL, COMPREHENSIVE  -     LIPID PANEL  -     CBC WITH AUTOMATED DIFF  3. Chronic obstructive pulmonary disease, unspecified COPD type ([facility] [address]  -     PULMONARY FUNCTION TEST; Future  -     [facility]  4. Controlled type 2 diabetes mellitus without complication, with long-term current use of insulin (HCC)  -     METABOLIC PANEL, COMPREHENSIVE  -     LIPID PANEL  -     HEMOGLOBIN A1C WITH EAG  -     MICROALBUMIN, UR, RAND  5. Chronic pain: I've refilled her oxy-IR, but also state that we don't intend to carry on chronic pain management. Refer again to pain clinic. 6. Anxiety and depression: Might add cymbalta for pain as well as depression. Refer to Dr. Svetlana Rhodes. 7. Rheumatoid arthritis, involving unspecified site, unspecified rheumatoid factor presence (HCC)  -     SED RATE (ESR)  -     RHEUMATOID FACTOR, QL  -     REFERRAL TO RHEUMATOLOGY  -     oxyCODONE (OXYIR) 5 mg capsule; Take 1 Cap by mouth every four (4) hours as needed. Max Daily Amount: 30 mg. Follow-up Disposition:  Return in about 4 months (around 10/29/2018) for DM, etc.    I have reviewed the patient's medications and risks/side effects/benefits were discussed. Diagnosis(-es) explained to patient and questions answered. Literature provided where appropriate.

## 2018-06-29 NOTE — PROGRESS NOTES
1. Have you been to the ER, urgent care clinic since your last visit? Hospitalized since your last visit?no    2. Have you seen or consulted any other health care providers outside of the [address] [place] [address] since your last visit? Include any pap smears or colon screening.  no

## 2018-06-29 NOTE — MR AVS SNAPSHOT
[address] [facility] 
[phone] Patient: Jenny Martines MRN: [id number] Visit Information Date & Time Provider Department Dept. Phone Encounter #  
 6/29/2018  1:45 PM Calista Veliz, [address] [phone] [id number] Follow-up Instructions Return in about 4 months (around 10/29/2018) for DM, etc.  
 Follow-up and Disposition History Upcoming Health Maintenance Date Due Hepatitis C Screening 1950 ZOSTER VACCINE AGE 60> 5/16/2012 DTaP/Tdap/Td series (1 - Tdap) 7/7/2013 GLAUCOMA SCREENING Q2Y 7/16/2017 Pneumococcal 65+ Low/Medium Risk (1 of 2 - PCV13) 7/16/2017 FOBT Q 1 YEAR AGE 50-75 3/6/2018 MEDICARE YEARLY EXAM 3/20/2018 Influenza Age 5 to Adult 8/1/2018 BREAST CANCER SCRN MAMMOGRAM 3/15/2020 Allergies as of 6/29/2018  Review Complete On: 6/29/2018 By: Calista Veliz MD  
  
 Severity Noted Reaction Type Reactions Buspirone  06/29/2018    Other (comments)  
 jittery Tylenol-codeine #3 [Acetaminophen-codeine]  07/06/2013    Itching Patient states she cannot tolerate APAP Current Immunizations  Never Reviewed Name Date Td, Adsorbed PF 7/6/2013 12:09 PM  
  
 Not reviewed this visit You Were Diagnosed With   
  
 Codes Comments Rheumatoid arthritis, involving unspecified site, unspecified rheumatoid factor presence ([facility]    -  Primary ICD-10-CM: M06.9 ICD-9-CM: 714.0 Asthma, unspecified asthma severity, unspecified whether complicated, unspecified whether persistent     ICD-10-CM: J45.909 ICD-9-CM: 493.90 Chronic obstructive pulmonary disease, unspecified COPD type ([facility]     ICD-10-CM: J44.9 ICD-9-CM: 743 Conjunctivitis of left eye, unspecified conjunctivitis type     ICD-10-CM: H10.9 ICD-9-CM: 372.30 Cough     ICD-10-CM: R05 ICD-9-CM: 786.2  Other chronic pain     ICD-10-CM: G89.29 
 ICD-9-CM: 338.29 Type 2 diabetes mellitus without complication, with long-term current use of insulin ([facility])     ICD-10-CM: E11.9, Z79.4 ICD-9-CM: 250.00, V58.67 Essential hypertension     ICD-10-CM: I10 
ICD-9-CM: 401.9 Anxiety     ICD-10-CM: F41.9 ICD-9-CM: 300.00 Vitals BP Pulse Temp Resp Height(growth percentile) Weight(growth percentile) (!) 156/92 (BP 1 Location: Left arm, BP Patient Position: Sitting) 71 98.3 °F (36.8 °C) (Oral) 16 5' 5\" (1.651 m) 129 lb (58.5 kg) SpO2 BMI OB Status Smoking Status 97% 21.47 kg/m2 Postmenopausal Current Every Day Smoker Vitals History BMI and BSA Data Body Mass Index Body Surface Area  
 21.47 kg/m 2 1.64 m 2 Preferred Pharmacy Pharmacy Name Phone [facility] [address]  [place] [phone] Your Updated Medication List  
  
   
This list is accurate as of 6/29/18  2:29 PM.  Always use your most recent med list.  
  
  
  
  
 albuterol-ipratropium 2.5 mg-0.5 mg/3 ml Nebu Commonly known as:  DUO-NEB  
USE 1 VIAL VIA NEBULIZER EVERY 4 HOURS AS NEEDED  
  
 BD INSULIN SYRINGE ULTRA-FINE 1 mL 31 gauge x 5/16 Syrg Generic drug:  Insulin Syringe-Needle U-100  
USE AS DIRECTED TWICE A DAY  
  
 busPIRone 10 mg tablet Commonly known as:  BUSPAR Take 1 Tab by mouth three (3) times daily. For anxiety  
  
 cetirizine 10 mg tablet Commonly known as:  ZYRTEC Take 10 mg by mouth nightly. chlorpheniramine-HYDROcodone 10-8 mg/5 mL suspension Commonly known as:  Apryl Glen Take 5 mL by mouth every twelve (12) hours as needed for Cough. Max Daily Amount: 10 mL. cholecalciferol 50,000 unit capsule Commonly known as:  VITAMIN D3 Take 1 Cap by mouth Every Thursday. DULoxetine 30 mg capsule Commonly known as:  CYMBALTA Take 1 Cap by mouth daily. loratadine 10 mg tablet Commonly known as:  German Mckeon  
 TAKE 1 TABLET BY MOUTH EVERY DAY  
  
 losartan 50 mg tablet Commonly known as:  COZAAR Take 1 Tab by mouth daily. Patient didn't recognize medication name - Rx Query states filled 2/18 #30 for 30 day supply  
  
 metFORMIN 1,000 mg tablet Commonly known as:  GLUCOPHAGE Take 1,000 mg by mouth two (2) times daily (with meals). Patient unsure of dose or frequency - Rx Query states filled 2/18 #60 for 30 day supply  
  
 methylPREDNISolone 4 mg tablet Commonly known as:  MEDROL (STERLING) Take as instructed  
  
 metoprolol tartrate 25 mg tablet Commonly known as:  LOPRESSOR Take 25 mg by mouth daily. mirtazapine 30 mg tablet Commonly known as:  REMERON  
TAKE 1 TABLET BY MOUTH EVERY DAY AT BEDTIME FOR APPETITE SLEEP AND ANXIETY  
  
 naproxen 500 mg tablet Commonly known as:  NAPROSYN Take 1 Tab by mouth every twelve (12) hours as needed for Pain. NovoLIN N NPH U-100 Insulin 100 unit/mL injection Generic drug:  insulin NPH INJECT 20 UNITS SUBCUTANEOUSLY TWICE A DAY  
  
 omeprazole 20 mg capsule Commonly known as:  PRILOSEC Take 20 mg by mouth daily. ondansetron 4 mg disintegrating tablet Commonly known as:  ZOFRAN ODT Take 1 Tab by mouth every six (6) hours as needed for Nausea. oxyCODONE 5 mg capsule Commonly known as:  OXYIR Take 1 Cap by mouth every four (4) hours as needed. Max Daily Amount: 30 mg. PROAIR HFA 90 mcg/actuation inhaler Generic drug:  albuterol Take 2 Puffs by inhalation every four (4) hours as needed for Shortness of Breath. Usually uses at least once daily. sulfacetamide 10 % ophthalmic solution Commonly known as:  BLEPH-10 Administer 1 Drop to left eye every three (3) hours. SYMBICORT 80-4.5 mcg/actuation Hfaa Generic drug:  budesonide-formoterol Take 2 Puffs by inhalation two (2) times a day. traZODone 50 mg tablet Commonly known as:  DESYREL  
TAKE 1 TABLET BY MOUTH EVERY NIGHT AT BEDTIME  
  
  
  
 Prescriptions Printed Refills  
 chlorpheniramine-HYDROcodone (TUSSIONEX) 10-8 mg/5 mL suspension 0 Sig: Take 5 mL by mouth every twelve (12) hours as needed for Cough. Max Daily Amount: 10 mL. Class: Print Route: Oral  
 oxyCODONE (OXYIR) 5 mg capsule 0 Sig: Take 1 Cap by mouth every four (4) hours as needed. Max Daily Amount: 30 mg.  
 Class: Print Route: Oral  
  
Prescriptions Sent to Pharmacy Refills  
 albuterol-ipratropium (DUO-NEB) 2.5 mg-0.5 mg/3 ml nebu 1 Sig: USE 1 VIAL VIA NEBULIZER EVERY 4 HOURS AS NEEDED Class: Normal  
 Pharmacy: [facility] [address] AT [address] [place] [address] Ph #: [phone]  
 sulfacetamide (BLEPH-10) 10 % ophthalmic solution 1 Sig: Administer 1 Drop to left eye every three (3) hours. Class: Normal  
 Pharmacy: [address] Ph #: [phone] Route: Left Eye  
 ondansetron (ZOFRAN ODT) 4 mg disintegrating tablet 1 Sig: Take 1 Tab by mouth every six (6) hours as needed for Nausea. Class: Normal  
 Pharmacy: [address] Ph #: [phone] Route: Oral  
 losartan (COZAAR) 50 mg tablet 11 Sig: Take 1 Tab by mouth daily. Patient didn't recognize medication name - Rx Query states filled 2/18 #30 for 30 day supply Class: Normal  
 Pharmacy: [address] Ph #: [phone] Route: Oral  
 DULoxetine (CYMBALTA) 30 mg capsule 11 Sig: Take 1 Cap by mouth daily. Class: Normal  
 Pharmacy: [address] Ph #: [phone] Route: Oral  
  
We Performed the Following HEMOGLOBIN A1C WITH EAG [18678 CPT(R)] LIPID PANEL [54370 CPT(R)] METABOLIC PANEL, COMPREHENSIVE [03678 CPT(R)] MICROALBUMIN, UR, RAND [id number] CPT(R)] REFERRAL TO PAIN CLINIC [TMS59 Custom] REFERRAL TO PSYCHIATRY [REF91 Custom] REFERRAL TO PULMONARY DISEASE [GRR43 Custom] REFERRAL TO RHEUMATOLOGY [AAV59 Custom] RHEUMATOID FACTOR, QL [id number] CPT(R)] SED RATE (ESR) [id number] CPT(R)] Follow-up Instructions Return in about 4 months (around 10/29/2018) for DM, etc.  
  
  
Referral Information Referral ID Referred By Referred To  
  
 [id number] JOSE HUGGINS MD   
   [facility] [address] Phone: [phone] Fax: [phone] Visits Status Start Date End Date 1 New Request 6/29/18 6/29/19 If your referral has a status of pending review or denied, additional information will be sent to support the outcome of this decision. Referral ID Referred By Referred To  
 [id number] [facility], [address] Kristi Spears MD  
   [address] [facility], [address] [place] [address] Phone: [phone] Fax: [phone] Visits Status Start Date End Date 1 New Request 6/29/18 6/29/19 If your referral has a status of pending review or denied, additional information will be sent to support the outcome of this decision. Referral ID Referred By Referred To  
 [id number] [facility] [address] [place], [address] Visits Status Start Date End Date 1 New Request 6/29/18 6/29/19 If your referral has a status of pending review or denied, additional information will be sent to support the outcome of this decision. Referral ID Referred By Referred To  
 [id number] Alessio Eagle MD  
   [address] [facility] [place], [address] Phone: [phone] Fax: [phone] Visits Status Start Date End Date 1 New Request 6/29/18 6/29/19 If your referral has a status of pending review or denied, additional information will be sent to support the outcome of this decision. Introducing [facility] [place] [facility]! [facility] introduces [employer] patient portal. Now you can access parts of your medical record, email your doctor's office, and request medication refills online. 1. In your internet browser, go to [email or web address] 2. Click on the First Time User? Click Here link in the Sign In box. You will see the New Member Sign Up page. 3. Enter your Neozone Access Code exactly as it appears below. You will not need to use this code after youve completed the sign-up process. If you do not sign up before the expiration date, you must request a new code. · [employer] Access Code: [email or web address] Expires: 8/6/2018 10:52 AM 
 
4. Enter the last four digits of your Social Security Number (xxxx) and Date of Birth (mm/dd/yyyy) as indicated and click Submit. You will be taken to the next sign-up page. 5. Create a Neozone ID. This will be your Neozone login ID and cannot be changed, so think of one that is secure and easy to remember. 6. Create a Neozone password. You can change your password at any time. 7. Enter your Password Reset Question and Answer. This can be used at a later time if you forget your password. 8. Enter your e-mail address. You will receive e-mail notification when new information is available in [address] 9. Click Sign Up. You can now view and download portions of your medical record. 10. Click the Download Summary menu link to download a portable copy of your medical information. If you have questions, please visit the Frequently Asked Questions section of the Neozone website. Remember, [employer] is NOT to be used for urgent needs. For medical emergencies, dial 911. Now available from your iPhone and Android! Please provide this summary of care documentation to your next provider. Your primary care clinician is listed as [place]. If you have any questions after today's visit, please call [phone].

## 2018-06-30 RX ORDER — IPRATROPIUM BROMIDE AND ALBUTEROL SULFATE 2.5; .5 MG/3ML; MG/3ML
SOLUTION RESPIRATORY (INHALATION)
Qty: 1620 ML | Refills: 1 | Status: SHIPPED | OUTPATIENT
Start: 2018-06-30 | End: 2018-09-26 | Stop reason: SDUPTHER

## 2018-07-23 ENCOUNTER — TELEPHONE (OUTPATIENT)
Dept: INTERNAL MEDICINE CLINIC | Age: 68
End: 2018-07-23

## 2018-07-23 NOTE — TELEPHONE ENCOUNTER
Pt is returning a call from the practice.  Best contact number: [phone]       Message received & copied from [facility]

## 2018-07-23 NOTE — TELEPHONE ENCOUNTER
Left voicemail informing patient that she can schedule an appointment with Nick Fitzgerald for a med review.  (not charge)

## 2018-08-09 DIAGNOSIS — M06.9 RHEUMATOID ARTHRITIS, INVOLVING UNSPECIFIED SITE, UNSPECIFIED RHEUMATOID FACTOR PRESENCE: ICD-10-CM

## 2018-08-09 DIAGNOSIS — G89.29 OTHER CHRONIC PAIN: ICD-10-CM

## 2018-08-09 NOTE — TELEPHONE ENCOUNTER
Patient request call when Hard Copy is ready for . Patient states she has appt at the end of next week for pain management doctor. Please call if any questions. Thank you      Patient reminded of [address] around on refills.

## 2018-08-10 RX ORDER — OXYCODONE HYDROCHLORIDE 5 MG/1
5 CAPSULE ORAL
Qty: 20 CAP | Refills: 0 | Status: SHIPPED | OUTPATIENT
Start: 2018-08-10 | End: 2018-08-16 | Stop reason: SDUPTHER

## 2018-08-16 ENCOUNTER — TELEPHONE (OUTPATIENT)
Dept: INTERNAL MEDICINE CLINIC | Age: 68
End: 2018-08-16

## 2018-08-16 DIAGNOSIS — M06.9 RHEUMATOID ARTHRITIS, INVOLVING UNSPECIFIED SITE, UNSPECIFIED RHEUMATOID FACTOR PRESENCE: ICD-10-CM

## 2018-08-16 DIAGNOSIS — G89.29 OTHER CHRONIC PAIN: ICD-10-CM

## 2018-08-16 RX ORDER — OXYCODONE HYDROCHLORIDE 5 MG/1
5 CAPSULE ORAL
Qty: 20 CAP | Refills: 0 | Status: SHIPPED | OUTPATIENT
Start: 2018-08-16 | End: 2018-09-26 | Stop reason: SDUPTHER

## 2018-08-16 NOTE — TELEPHONE ENCOUNTER
BEST H/C(113) [id number]   Pt stated, she called last wk requesting to p/up written \"Oxycodone 5mg\" Rx.   Pt stated, she ran out 2-3 days ago.  Pt advised, she has an appt with her Dr. Mimi Ferris, Rheumatologist on Monday, 08/20/18.   Please call when ready.        Message copied/pasted from Talon Masterson

## 2018-08-16 NOTE — TELEPHONE ENCOUNTER
Patient was called. 2 identifiers was used. Patient notified that her prescription for her Oxycodone is ready to be picked up at the office so she can take it to the pharmacy to be filled.

## 2018-08-16 NOTE — TELEPHONE ENCOUNTER
Identified patient 2 identifiers verified. Patient reports tingling in right arm , denies chest pains, SOB. Complaining of pain in shoulder due to arthritis. Pain rating 4 and intermittent. Patient reports trouble sleeping last night. And requesting refill on Oxycodone.

## 2018-08-30 ENCOUNTER — TELEPHONE (OUTPATIENT)
Dept: INTERNAL MEDICINE CLINIC | Age: 68
End: 2018-08-30

## 2018-08-30 DIAGNOSIS — R05.9 COUGH: ICD-10-CM

## 2018-08-30 NOTE — TELEPHONE ENCOUNTER
Pt line was disconnected while speaking with nurse regarding refill for cough syrup. Pt can be called back at [phone].

## 2018-08-31 RX ORDER — HYDROCODONE POLISTIREX AND CHLORPHENIRAMINE POLISTIREX 10; 8 MG/5ML; MG/5ML
5 SUSPENSION, EXTENDED RELEASE ORAL
Qty: 115 ML | Refills: 0 | Status: SHIPPED | OUTPATIENT
Start: 2018-08-31 | End: 2018-09-04 | Stop reason: SDUPTHER

## 2018-09-04 ENCOUNTER — TELEPHONE (OUTPATIENT)
Dept: INTERNAL MEDICINE CLINIC | Age: 68
End: 2018-09-04

## 2018-09-04 DIAGNOSIS — R05.9 COUGH: ICD-10-CM

## 2018-09-04 RX ORDER — HYDROCODONE POLISTIREX AND CHLORPHENIRAMINE POLISTIREX 10; 8 MG/5ML; MG/5ML
5 SUSPENSION, EXTENDED RELEASE ORAL
Qty: 115 ML | Refills: 0 | Status: SHIPPED | OUTPATIENT
Start: 2018-09-04 | End: 2018-10-29

## 2018-09-26 DIAGNOSIS — G89.29 OTHER CHRONIC PAIN: ICD-10-CM

## 2018-09-26 DIAGNOSIS — J45.909 ASTHMA, UNSPECIFIED ASTHMA SEVERITY, UNSPECIFIED WHETHER COMPLICATED, UNSPECIFIED WHETHER PERSISTENT: ICD-10-CM

## 2018-09-26 DIAGNOSIS — J44.9 CHRONIC OBSTRUCTIVE PULMONARY DISEASE, UNSPECIFIED COPD TYPE (HCC): ICD-10-CM

## 2018-09-26 DIAGNOSIS — M06.9 RHEUMATOID ARTHRITIS, INVOLVING UNSPECIFIED SITE, UNSPECIFIED RHEUMATOID FACTOR PRESENCE: ICD-10-CM

## 2018-09-26 NOTE — TELEPHONE ENCOUNTER
#[phone] pt states she needs a refill on the pain medication from Dr. Bernrada Gamez as she doesn't see her other physician until October. If you need the date of that appt she can give you that if needed as she didn't have right there with her. Pt states she doesn't take this all the time, just as needed. Pt requesting call when this is ready for pickup. Pt states she is out of the inhaler and needs this as soon as possible. Also solution for her nebulizer machine.    Pt was advised of 48/72 hour around

## 2018-09-28 RX ORDER — OXYCODONE HYDROCHLORIDE 5 MG/1
5 CAPSULE ORAL
Qty: 20 CAP | Refills: 0 | Status: SHIPPED | OUTPATIENT
Start: 2018-09-28

## 2018-09-28 RX ORDER — IPRATROPIUM BROMIDE AND ALBUTEROL SULFATE 2.5; .5 MG/3ML; MG/3ML
SOLUTION RESPIRATORY (INHALATION)
Qty: 1620 ML | Refills: 1 | Status: SHIPPED | OUTPATIENT
Start: 2018-09-28 | End: 2020-04-03 | Stop reason: SDUPTHER

## 2018-09-28 RX ORDER — BUDESONIDE AND FORMOTEROL FUMARATE DIHYDRATE 80; 4.5 UG/1; UG/1
2 AEROSOL RESPIRATORY (INHALATION) 2 TIMES DAILY
Qty: 1 INHALER | Refills: 11 | Status: SHIPPED | OUTPATIENT
Start: 2018-09-28

## 2018-10-01 RX ORDER — ALBUTEROL SULFATE 90 UG/1
2 AEROSOL, METERED RESPIRATORY (INHALATION)
Qty: 1 INHALER | Refills: 11 | Status: SHIPPED | OUTPATIENT
Start: 2018-10-01

## 2018-10-29 ENCOUNTER — HOSPITAL ENCOUNTER (OUTPATIENT)
Dept: LAB | Age: 68
Discharge: HOME | End: 2018-10-29
Payer: MEDICARE

## 2018-10-29 ENCOUNTER — OFFICE VISIT (OUTPATIENT)
Dept: INTERNAL MEDICINE CLINIC | Age: 68
End: 2018-10-29

## 2018-10-29 VITALS
SYSTOLIC BLOOD PRESSURE: 126 MMHG | BODY MASS INDEX: 21.79 KG/M2 | RESPIRATION RATE: 16 BRPM | OXYGEN SATURATION: 97 % | TEMPERATURE: 99 F | DIASTOLIC BLOOD PRESSURE: 83 MMHG | WEIGHT: 130.8 LBS | HEIGHT: 65 IN | HEART RATE: 71 BPM

## 2018-10-29 DIAGNOSIS — R05.9 COUGH: ICD-10-CM

## 2018-10-29 DIAGNOSIS — J44.9 CHRONIC OBSTRUCTIVE PULMONARY DISEASE, UNSPECIFIED COPD TYPE (HCC): ICD-10-CM

## 2018-10-29 DIAGNOSIS — M06.9 RHEUMATOID ARTHRITIS, INVOLVING UNSPECIFIED SITE, UNSPECIFIED RHEUMATOID FACTOR PRESENCE: Primary | ICD-10-CM

## 2018-10-29 DIAGNOSIS — F41.9 ANXIETY: ICD-10-CM

## 2018-10-29 PROCEDURE — 36415 COLL VENOUS BLD VENIPUNCTURE: CPT

## 2018-10-29 PROCEDURE — 80053 COMPREHEN METABOLIC PANEL: CPT

## 2018-10-29 PROCEDURE — 85651 RBC SED RATE NONAUTOMATED: CPT

## 2018-10-29 PROCEDURE — 80061 LIPID PANEL: CPT

## 2018-10-29 PROCEDURE — 86431 RHEUMATOID FACTOR QUANT: CPT

## 2018-10-29 PROCEDURE — 82043 UR ALBUMIN QUANTITATIVE: CPT

## 2018-10-29 PROCEDURE — 83036 HEMOGLOBIN GLYCOSYLATED A1C: CPT

## 2018-10-29 RX ORDER — ONDANSETRON 4 MG/1
4 TABLET, ORALLY DISINTEGRATING ORAL
Qty: 30 TAB | Refills: 1 | Status: SHIPPED | OUTPATIENT
Start: 2018-10-29 | End: 2019-03-18 | Stop reason: SDUPTHER

## 2018-10-29 RX ORDER — HYDROCODONE POLISTIREX AND CHLORPHENIRAMINE POLISTIREX 10; 8 MG/5ML; MG/5ML
5 SUSPENSION, EXTENDED RELEASE ORAL
Qty: 115 ML | Refills: 0 | Status: SHIPPED | OUTPATIENT
Start: 2018-10-29 | End: 2020-04-03

## 2018-10-29 NOTE — PROGRESS NOTES
SUBJECTIVE Ms. Roel Kline presents today acutely for the following issues. She is due also for follow up of routine medical issues. She was a longterm patient of Dr. Stephan Prater, who retired. Chief Complaint Patient presents with  URI pt c.o sore throat and productive cough x 2 days  Diabetes  
  pt here today for 4 month f.u  
 Panic Attack  
  pt wants to discuss panic attacks She is now in pain management with Dr. Fatimah Coronado. She states that they discussed the cough syrup and he's okay with it. She has had worsening of her cough for a couple of days. Productive of clear or white sputum. \"I have COPD. \"  She hasn't yet been to see Pulmonology. Has a chronic productive cough. Weight loss: Now on remeron for this. Wt Readings from Last 3 Encounters:  
10/29/18 130 lb 12.8 oz (59.3 kg) 06/29/18 129 lb (58.5 kg) 06/25/18 125 lb 10.6 oz (57 kg) DM: On insulin. \"I think that's doing fine. \" It appears she was admitted over a year ago for LLQ abdom pain and BRBPR; CT showed diverticulosis with no diverticulitis. Anxiety: We noted last month:  \"I be depressed. I don't feel well. I have to make myself do things. \"  She is anxious \"all the time. \"  Trouble sleeping at night. She used to be on benzodiazepines, when she was seeing a psychiatrist \"at the [facility]\"; office closed down. Dr. Margraita Medina showed up for an appointment and they was gone. \"  Now not seeing a psychiatrist. 
 
At this time, she is otherwise doing well and has brought no other complaints to my attention today. For a list of the medical issues addressed today, see the assessment and plan below. PMH:  
Past Medical History:  
Diagnosis Date  Anxiety  Asthma  COPD  Diabetes ([facility]  Dyspepsia and other specified disorders of function of stomach  Hemorrhoids  Hypertension  Rheumatoid arthritis ([facility] 5/8/2018 Past Surgical History: Procedure Laterality Date  HX BREAST BIOPSY Left   
 benign  HX GYN Thermal Ablation  HX HEART CATHETERIZATION    
 exploration  HX LAPAROTOMY  HX ORTHOPAEDIC    
 right knee  HX OTHER SURGICAL Colonoscopy  HX TUBAL LIGATION All: is allergic to buspirone and tylenol-codeine #3 [acetaminophen-codeine]. Current Outpatient Medications Medication Sig  
 albuterol (PROAIR HFA) 90 mcg/actuation inhaler Take 2 Puffs by inhalation every four (4) hours as needed for Shortness of Breath. Usually uses at least once daily.  oxyCODONE (OXYIR) 5 mg capsule Take 1 Cap by mouth every four (4) hours as needed. Max Daily Amount: 30 mg.  
 albuterol-ipratropium (DUO-NEB) 2.5 mg-0.5 mg/3 ml nebu USE 1 VIAL VIA NEBULIZER EVERY 4 HOURS AS NEEDED  
 budesonide-formoterol (SYMBICORT) 80-4.5 mcg/actuation HFAA Take 2 Puffs by inhalation two (2) times a day.  sulfacetamide (BLEPH-10) 10 % ophthalmic solution Administer 1 Drop to left eye every three (3) hours.  ondansetron (ZOFRAN ODT) 4 mg disintegrating tablet Take 1 Tab by mouth every six (6) hours as needed for Nausea.  losartan (COZAAR) 50 mg tablet TAKE 1 TABLET BY MOUTH DAILY  DULoxetine (CYMBALTA) 30 mg capsule TAKE 1 CAPSULE BY MOUTH DAILY  mirtazapine (REMERON) 30 mg tablet TAKE 1 TABLET BY MOUTH EVERY DAY AT BEDTIME FOR APPETITE SLEEP AND ANXIETY  traZODone (DESYREL) 50 mg tablet TAKE 1 TABLET BY MOUTH EVERY NIGHT AT BEDTIME  loratadine (CLARITIN) 10 mg tablet TAKE 1 TABLET BY MOUTH EVERY DAY  BD INSULIN SYRINGE ULTRA-FINE 1 mL 31 gauge x 5/16 syrg USE AS DIRECTED TWICE A DAY  
 NOVOLIN N NPH U-100 INSULIN 100 unit/mL injection INJECT 20 UNITS SUBCUTANEOUSLY TWICE A DAY  cetirizine (ZYRTEC) 10 mg tablet Take 10 mg by mouth nightly.  Cholecalciferol, Vitamin D3, 50,000 unit cap Take 1 Cap by mouth Every Thursday.  omeprazole (PRILOSEC) 20 mg capsule Take 20 mg by mouth daily.  metFORMIN (GLUCOPHAGE) 1,000 mg tablet Take 1,000 mg by mouth two (2) times daily (with meals). Patient unsure of dose or frequency - Rx Query states filled 2/18 #60 for 30 day supply  metoprolol (LOPRESSOR) 25 mg tablet Take 25 mg by mouth daily.  chlorpheniramine-HYDROcodone (TUSSIONEX) 10-8 mg/5 mL suspension Take 5 mL by mouth every twelve (12) hours as needed for Cough. Max Daily Amount: 10 mL.  methylPREDNISolone (MEDROL, STERLING,) 4 mg tablet Take as instructed  busPIRone (BUSPAR) 10 mg tablet Take 1 Tab by mouth three (3) times daily. For anxiety  naproxen (NAPROSYN) 500 mg tablet Take 1 Tab by mouth every twelve (12) hours as needed for Pain. No current facility-administered medications for this visit. FH: family history includes Cancer in her sister; Heart Disease in her father. SH:  reports that she has been smoking. She has a 9.50 pack-year smoking history. she has never used smokeless tobacco. She reports that she drinks about 1.0 oz of alcohol per week. She reports that she does not use drugs. ROS: See above; Complete ROS otherwise negative. OBJECTIVE:  
Vitals:  
Visit Vitals /83 (BP 1 Location: Left arm, BP Patient Position: Sitting) Pulse 71 Temp 99 °F (37.2 °C) (Oral) Resp 16 Ht 5' 5\" (1.651 m) Wt 130 lb 12.8 oz (59.3 kg) SpO2 97% BMI 21.77 kg/m² Gen: Pleasant 79 y.o.  female in NAD. HEENT: PERRLA. EOMI. OP moist and pink. Neck: Supple. No LAD. HEART: RRR, No M/G/R.    LUNGS: CTAB No W/R. ABDOMEN: S, NT, ND, BS+. EXTREMITIES: Warm. No C/C/E.  MUSCULOSKELETAL: Normal ROM, muscle strength 5/5 all groups. NEURO: Alert and oriented x 3. Cranial nerves grossly intact. No focal sensory or motor deficits noted. SKIN: Warm. Dry. No rashes or other lesions noted. Lab Results Component Value Date/Time  Sodium 145 06/25/2018 03:00 PM  
 Potassium 3.7 06/25/2018 03:00 PM  
 Chloride 108 06/25/2018 03:00 PM  
 CO2 28 06/25/2018 03:00 PM  
 Anion gap 9 06/25/2018 03:00 PM  
 Glucose 145 (H) 06/25/2018 03:00 PM  
 BUN 6 06/25/2018 03:00 PM  
 Creatinine 0.80 06/25/2018 03:00 PM  
 BUN/Creatinine ratio 8 (L) 06/25/2018 03:00 PM  
 GFR est AA >60 06/25/2018 03:00 PM  
 GFR est non-AA >60 06/25/2018 03:00 PM  
 Calcium 8.7 06/25/2018 03:00 PM  
 Bilirubin, total 0.4 06/25/2018 03:00 PM  
 ALT (SGPT) 18 06/25/2018 03:00 PM  
 AST (SGOT) 17 06/25/2018 03:00 PM  
 Alk. phosphatase 99 06/25/2018 03:00 PM  
 Protein, total 6.5 06/25/2018 03:00 PM  
 Albumin 2.7 (L) 06/25/2018 03:00 PM  
 Globulin 3.8 06/25/2018 03:00 PM  
 A-G Ratio 0.7 (L) 06/25/2018 03:00 PM  
 
  
Lab Results Component Value Date/Time Hemoglobin A1c 7.2 (H) 03/06/2017 03:10 AM  
 
 
Lab Results Component Value Date/Time WBC 5.3 06/25/2018 03:00 PM  
 HGB 14.7 06/25/2018 03:00 PM  
 HCT 43.2 06/25/2018 03:00 PM  
 PLATELET 114 99/56/3175 03:00 PM  
 MCV 95.6 06/25/2018 03:00 PM  
 
 
 
ASSESSMENT/ PLAN: Diagnoses and all orders for this visit: 
 
1. Hypertension, unspecified type: BP fine today. -     METABOLIC PANEL, COMPREHENSIVE 
-     LIPID PANEL 
-     CBC WITH AUTOMATED DIFF 2. Chronic obstructive pulmonary disease, unspecified COPD type ([address] -     PULMONARY FUNCTION TEST; Future -     [facility] 3. Type 2 diabetes mellitus without complication, with long-term current use of insulin ([address] -     METABOLIC PANEL, COMPREHENSIVE 
-     LIPID PANEL 
-     HEMOGLOBIN A1C WITH EAG 
-     MICROALBUMIN, UR, RAND 4. Chronic pain:She is now in pain clinic. 5. Anxiety and depression: Stable. Refer again to Dr. Estrella Giang. 6. Rheumatoid arthritis, involving unspecified site, unspecified rheumatoid factor presence ([address] -     SED RATE (ESR) 
-     RHEUMATOID FACTOR, QL 
-     REFERRAL TO RHEUMATOLOGY Follow-up Disposition: 
Return in about 4 months (around 2/28/2019) for DM. I have reviewed the patient's medications and risks/side effects/benefits were discussed. Diagnosis(-es) explained to patient and questions answered. Literature provided where appropriate.

## 2018-10-30 LAB
ALBUMIN SERPL-MCNC: 4.4 G/DL (ref 3.6–4.8)
ALBUMIN/GLOB SERPL: 1.8 {RATIO} (ref 1.2–2.2)
ALP SERPL-CCNC: 99 IU/L (ref 39–117)
ALT SERPL-CCNC: 15 IU/L (ref 0–32)
AST SERPL-CCNC: 22 IU/L (ref 0–40)
BILIRUB SERPL-MCNC: 1.1 MG/DL (ref 0–1.2)
BUN SERPL-MCNC: 7 MG/DL (ref 8–27)
BUN/CREAT SERPL: 8 (ref 12–28)
CALCIUM SERPL-MCNC: 8.7 MG/DL (ref 8.7–10.3)
CHLORIDE SERPL-SCNC: 99 MMOL/L (ref 96–106)
CHOLEST SERPL-MCNC: 190 MG/DL (ref 100–199)
CO2 SERPL-SCNC: 21 MMOL/L (ref 20–29)
CREAT SERPL-MCNC: 0.86 MG/DL (ref 0.57–1)
ERYTHROCYTE [SEDIMENTATION RATE] IN BLOOD BY WESTERGREN METHOD: 11 MM/HR (ref 0–40)
EST. AVERAGE GLUCOSE BLD GHB EST-MCNC: 140 MG/DL
GLOBULIN SER CALC-MCNC: 2.5 G/DL (ref 1.5–4.5)
GLUCOSE SERPL-MCNC: 138 MG/DL (ref 65–99)
HBA1C MFR BLD: 6.5 % (ref 4.8–5.6)
HDLC SERPL-MCNC: 62 MG/DL
LDLC SERPL CALC-MCNC: 75 MG/DL (ref 0–99)
MICROALBUMIN UR-MCNC: 74.6 UG/ML
POTASSIUM SERPL-SCNC: 3.6 MMOL/L (ref 3.5–5.2)
PROT SERPL-MCNC: 6.9 G/DL (ref 6–8.5)
RHEUMATOID FACT SERPL-ACNC: 110.8 IU/ML (ref 0–13.9)
SODIUM SERPL-SCNC: 143 MMOL/L (ref 134–144)
TRIGL SERPL-MCNC: 264 MG/DL (ref 0–149)
VLDLC SERPL CALC-MCNC: 53 MG/DL (ref 5–40)

## 2018-10-31 ENCOUNTER — TELEPHONE (OUTPATIENT)
Dept: INTERNAL MEDICINE CLINIC | Age: 68
End: 2018-10-31

## 2019-01-07 RX ORDER — LANCING DEVICE/LANCETS
KIT MISCELLANEOUS
Qty: 1 KIT | Refills: 11 | Status: SHIPPED | OUTPATIENT
Start: 2019-01-07 | End: 2019-04-01 | Stop reason: SDUPTHER

## 2019-01-07 RX ORDER — BLOOD-GLUCOSE METER
EACH MISCELLANEOUS
Qty: 1 EACH | Refills: 0 | Status: SHIPPED | OUTPATIENT
Start: 2019-01-07 | End: 2019-10-08 | Stop reason: SDUPTHER

## 2019-03-14 ENCOUNTER — APPOINTMENT (OUTPATIENT)
Dept: GENERAL RADIOLOGY | Age: 69
DRG: 247 | End: 2019-03-14
Attending: EMERGENCY MEDICINE
Payer: MEDICARE

## 2019-03-14 ENCOUNTER — HOSPITAL ENCOUNTER (INPATIENT)
Age: 69
LOS: 2 days | Discharge: HOME | DRG: 247 | End: 2019-03-16
Attending: EMERGENCY MEDICINE | Admitting: INTERNAL MEDICINE
Payer: MEDICARE

## 2019-03-14 DIAGNOSIS — R07.9 CHEST PAIN, UNSPECIFIED TYPE: ICD-10-CM

## 2019-03-14 DIAGNOSIS — I20.0 UNSTABLE ANGINA (HCC): Primary | ICD-10-CM

## 2019-03-14 LAB
ALBUMIN SERPL-MCNC: 3.6 G/DL (ref 3.5–5)
ALBUMIN/GLOB SERPL: 0.9 {RATIO} (ref 1.1–2.2)
ALP SERPL-CCNC: 114 U/L (ref 45–117)
ALT SERPL-CCNC: 22 U/L (ref 12–78)
ANION GAP SERPL CALC-SCNC: 9 MMOL/L (ref 5–15)
AST SERPL-CCNC: 19 U/L (ref 15–37)
BASOPHILS # BLD: 0.1 K/UL (ref 0–0.1)
BASOPHILS NFR BLD: 1 % (ref 0–1)
BILIRUB SERPL-MCNC: 0.6 MG/DL (ref 0.2–1)
BUN SERPL-MCNC: 6 MG/DL (ref 6–20)
BUN/CREAT SERPL: 6 (ref 12–20)
CALCIUM SERPL-MCNC: 8.5 MG/DL (ref 8.5–10.1)
CHLORIDE SERPL-SCNC: 108 MMOL/L (ref 97–108)
CO2 SERPL-SCNC: 25 MMOL/L (ref 21–32)
COMMENT, HOLDF: NORMAL
CREAT SERPL-MCNC: 0.95 MG/DL (ref 0.55–1.02)
DIFFERENTIAL METHOD BLD: NORMAL
EOSINOPHIL # BLD: 0.1 K/UL (ref 0–0.4)
EOSINOPHIL NFR BLD: 3 % (ref 0–7)
ERYTHROCYTE [DISTWIDTH] IN BLOOD BY AUTOMATED COUNT: 11.6 % (ref 11.5–14.5)
GLOBULIN SER CALC-MCNC: 4 G/DL (ref 2–4)
GLUCOSE SERPL-MCNC: 159 MG/DL (ref 65–100)
HCT VFR BLD AUTO: 37.5 % (ref 35–47)
HGB BLD-MCNC: 13.1 G/DL (ref 11.5–16)
IMM GRANULOCYTES # BLD AUTO: 0 K/UL (ref 0–0.04)
IMM GRANULOCYTES NFR BLD AUTO: 0 % (ref 0–0.5)
LYMPHOCYTES # BLD: 1.8 K/UL (ref 0.8–3.5)
LYMPHOCYTES NFR BLD: 35 % (ref 12–49)
MCH RBC QN AUTO: 31.9 PG (ref 26–34)
MCHC RBC AUTO-ENTMCNC: 34.9 G/DL (ref 30–36.5)
MCV RBC AUTO: 91.2 FL (ref 80–99)
MONOCYTES # BLD: 0.4 K/UL (ref 0–1)
MONOCYTES NFR BLD: 8 % (ref 5–13)
NEUTS SEG # BLD: 2.6 K/UL (ref 1.8–8)
NEUTS SEG NFR BLD: 53 % (ref 32–75)
NRBC # BLD: 0 K/UL (ref 0–0.01)
NRBC BLD-RTO: 0 PER 100 WBC
PLATELET # BLD AUTO: 215 K/UL (ref 150–400)
PMV BLD AUTO: 10.3 FL (ref 8.9–12.9)
POTASSIUM SERPL-SCNC: 3.9 MMOL/L (ref 3.5–5.1)
PROT SERPL-MCNC: 7.6 G/DL (ref 6.4–8.2)
RBC # BLD AUTO: 4.11 M/UL (ref 3.8–5.2)
SAMPLES BEING HELD,HOLD: NORMAL
SODIUM SERPL-SCNC: 142 MMOL/L (ref 136–145)
TROPONIN I SERPL-MCNC: <0.05 NG/ML
WBC # BLD AUTO: 5 K/UL (ref 3.6–11)

## 2019-03-14 PROCEDURE — 93005 ELECTROCARDIOGRAM TRACING: CPT

## 2019-03-14 PROCEDURE — 80053 COMPREHEN METABOLIC PANEL: CPT

## 2019-03-14 PROCEDURE — 84484 ASSAY OF TROPONIN QUANT: CPT

## 2019-03-14 PROCEDURE — 85025 COMPLETE CBC W/AUTO DIFF WBC: CPT

## 2019-03-14 PROCEDURE — 027036Z DILATION OF CORONARY ARTERY, ONE ARTERY WITH THREE DRUG-ELUTING INTRALUMINAL DEVICES, PERCUTANEOUS APPROACH: ICD-10-PCS | Performed by: INTERNAL MEDICINE

## 2019-03-14 PROCEDURE — 71046 X-RAY EXAM CHEST 2 VIEWS: CPT

## 2019-03-14 PROCEDURE — B2151ZZ FLUOROSCOPY OF LEFT HEART USING LOW OSMOLAR CONTRAST: ICD-10-PCS | Performed by: INTERNAL MEDICINE

## 2019-03-14 PROCEDURE — 74011250636 HC RX REV CODE- 250/636: Performed by: INTERNAL MEDICINE

## 2019-03-14 PROCEDURE — 99285 EMERGENCY DEPT VISIT HI MDM: CPT

## 2019-03-14 PROCEDURE — 4A023N7 MEASUREMENT OF CARDIAC SAMPLING AND PRESSURE, LEFT HEART, PERCUTANEOUS APPROACH: ICD-10-PCS | Performed by: INTERNAL MEDICINE

## 2019-03-14 PROCEDURE — 36415 COLL VENOUS BLD VENIPUNCTURE: CPT

## 2019-03-14 PROCEDURE — 74011250637 HC RX REV CODE- 250/637: Performed by: INTERNAL MEDICINE

## 2019-03-14 PROCEDURE — 74011250637 HC RX REV CODE- 250/637: Performed by: EMERGENCY MEDICINE

## 2019-03-14 PROCEDURE — B2111ZZ FLUOROSCOPY OF MULTIPLE CORONARY ARTERIES USING LOW OSMOLAR CONTRAST: ICD-10-PCS | Performed by: INTERNAL MEDICINE

## 2019-03-14 PROCEDURE — 65660000000 HC RM CCU STEPDOWN

## 2019-03-14 RX ORDER — CETIRIZINE HCL 10 MG
10 TABLET ORAL
Status: DISCONTINUED | OUTPATIENT
Start: 2019-03-14 | End: 2019-03-16 | Stop reason: HOSPADM

## 2019-03-14 RX ORDER — LOSARTAN POTASSIUM 50 MG/1
50 TABLET ORAL DAILY
Status: DISCONTINUED | OUTPATIENT
Start: 2019-03-15 | End: 2019-03-16 | Stop reason: HOSPADM

## 2019-03-14 RX ORDER — LORAZEPAM 0.5 MG/1
0.5 TABLET ORAL
Status: DISCONTINUED | OUTPATIENT
Start: 2019-03-14 | End: 2019-03-16 | Stop reason: HOSPADM

## 2019-03-14 RX ORDER — DEXTROSE 50 % IN WATER (D50W) INTRAVENOUS SYRINGE
12.5-25 AS NEEDED
Status: DISCONTINUED | OUTPATIENT
Start: 2019-03-14 | End: 2019-03-16 | Stop reason: HOSPADM

## 2019-03-14 RX ORDER — SODIUM CHLORIDE 0.9 % (FLUSH) 0.9 %
5-40 SYRINGE (ML) INJECTION EVERY 8 HOURS
Status: DISCONTINUED | OUTPATIENT
Start: 2019-03-14 | End: 2019-03-16 | Stop reason: HOSPADM

## 2019-03-14 RX ORDER — MAGNESIUM SULFATE 100 %
4 CRYSTALS MISCELLANEOUS AS NEEDED
Status: DISCONTINUED | OUTPATIENT
Start: 2019-03-14 | End: 2019-03-16 | Stop reason: HOSPADM

## 2019-03-14 RX ORDER — ENOXAPARIN SODIUM 100 MG/ML
1 INJECTION SUBCUTANEOUS ONCE
Status: COMPLETED | OUTPATIENT
Start: 2019-03-14 | End: 2019-03-14

## 2019-03-14 RX ORDER — METOPROLOL TARTRATE 25 MG/1
12.5 TABLET, FILM COATED ORAL 2 TIMES DAILY
Status: DISCONTINUED | OUTPATIENT
Start: 2019-03-15 | End: 2019-03-16 | Stop reason: HOSPADM

## 2019-03-14 RX ORDER — PANTOPRAZOLE SODIUM 40 MG/1
40 TABLET, DELAYED RELEASE ORAL
Status: DISCONTINUED | OUTPATIENT
Start: 2019-03-15 | End: 2019-03-16 | Stop reason: HOSPADM

## 2019-03-14 RX ORDER — INSULIN LISPRO 100 [IU]/ML
INJECTION, SOLUTION INTRAVENOUS; SUBCUTANEOUS
Status: DISCONTINUED | OUTPATIENT
Start: 2019-03-15 | End: 2019-03-16 | Stop reason: HOSPADM

## 2019-03-14 RX ORDER — ATORVASTATIN CALCIUM 40 MG/1
40 TABLET, FILM COATED ORAL
Status: DISCONTINUED | OUTPATIENT
Start: 2019-03-14 | End: 2019-03-16 | Stop reason: HOSPADM

## 2019-03-14 RX ORDER — ADHESIVE BANDAGE
30 BANDAGE TOPICAL DAILY PRN
Status: DISCONTINUED | OUTPATIENT
Start: 2019-03-14 | End: 2019-03-16 | Stop reason: HOSPADM

## 2019-03-14 RX ORDER — MIRTAZAPINE 15 MG/1
30 TABLET, FILM COATED ORAL
Status: DISCONTINUED | OUTPATIENT
Start: 2019-03-14 | End: 2019-03-16 | Stop reason: HOSPADM

## 2019-03-14 RX ORDER — DULOXETIN HYDROCHLORIDE 30 MG/1
30 CAPSULE, DELAYED RELEASE ORAL DAILY
Status: DISCONTINUED | OUTPATIENT
Start: 2019-03-15 | End: 2019-03-16 | Stop reason: HOSPADM

## 2019-03-14 RX ORDER — IPRATROPIUM BROMIDE AND ALBUTEROL SULFATE 2.5; .5 MG/3ML; MG/3ML
3 SOLUTION RESPIRATORY (INHALATION)
Status: DISCONTINUED | OUTPATIENT
Start: 2019-03-14 | End: 2019-03-16 | Stop reason: HOSPADM

## 2019-03-14 RX ORDER — ALBUTEROL SULFATE 90 UG/1
2 AEROSOL, METERED RESPIRATORY (INHALATION)
Status: DISCONTINUED | OUTPATIENT
Start: 2019-03-14 | End: 2019-03-16 | Stop reason: HOSPADM

## 2019-03-14 RX ORDER — FLUTICASONE FUROATE AND VILANTEROL 100; 25 UG/1; UG/1
1 POWDER RESPIRATORY (INHALATION) DAILY
Status: DISCONTINUED | OUTPATIENT
Start: 2019-03-15 | End: 2019-03-16 | Stop reason: HOSPADM

## 2019-03-14 RX ORDER — OXYCODONE HYDROCHLORIDE 5 MG/1
5 TABLET ORAL
Status: DISCONTINUED | OUTPATIENT
Start: 2019-03-14 | End: 2019-03-16 | Stop reason: HOSPADM

## 2019-03-14 RX ORDER — GUAIFENESIN 100 MG/5ML
81 LIQUID (ML) ORAL DAILY
Status: DISCONTINUED | OUTPATIENT
Start: 2019-03-15 | End: 2019-03-16 | Stop reason: HOSPADM

## 2019-03-14 RX ORDER — SODIUM CHLORIDE 0.9 % (FLUSH) 0.9 %
5-40 SYRINGE (ML) INJECTION AS NEEDED
Status: DISCONTINUED | OUTPATIENT
Start: 2019-03-14 | End: 2019-03-16 | Stop reason: HOSPADM

## 2019-03-14 RX ORDER — ASPIRIN 325 MG
325 TABLET ORAL ONCE
Status: COMPLETED | OUTPATIENT
Start: 2019-03-14 | End: 2019-03-14

## 2019-03-14 RX ORDER — ACETAMINOPHEN 325 MG/1
650 TABLET ORAL
Status: DISCONTINUED | OUTPATIENT
Start: 2019-03-14 | End: 2019-03-16 | Stop reason: HOSPADM

## 2019-03-14 RX ADMIN — ASPIRIN 325 MG: 325 TABLET ORAL at 17:05

## 2019-03-14 RX ADMIN — NITROGLYCERIN 0.5 INCH: 20 OINTMENT TOPICAL at 23:02

## 2019-03-14 RX ADMIN — Medication 10 ML: at 23:27

## 2019-03-14 RX ADMIN — ENOXAPARIN SODIUM 60 MG: 100 INJECTION SUBCUTANEOUS at 23:03

## 2019-03-14 RX ADMIN — CETIRIZINE HYDROCHLORIDE 10 MG: 10 TABLET, FILM COATED ORAL at 23:04

## 2019-03-14 RX ADMIN — ATORVASTATIN CALCIUM 40 MG: 40 TABLET, FILM COATED ORAL at 23:04

## 2019-03-14 NOTE — Clinical Note
Lesion located in the Mid LAD. Balloon inserted. Balloon inflated using multiple inflations inflation technique. Pressure = 15 chantel; Duration = 30 sec. Distal mid

## 2019-03-14 NOTE — Clinical Note
Lesion: Located in the Mid LAD. Stent inserted. Single technique used. First inflation pressure = 16 chantel; inflation time: 16 sec.  Mid mid

## 2019-03-14 NOTE — Clinical Note
Lesion located in the Mid LAD. Balloon removed. Removed due to unable to cross lesion. Balloon kept getting stuck on wire, unable to advance

## 2019-03-14 NOTE — Clinical Note
Lesion located in the Mid LAD. Balloon inserted. Balloon inflated using multiple inflations inflation technique. Pressure = 8 chantel; Duration = 15 sec. Inflation 2: Pressure: 8 chantel; Duration: 15 sec. Inflation 3: Pressure: 8 chantel; Duration: 15 sec. Inflation 4: Pressure: 10 chantel; Duration: 20 sec. Inflation 5: Pressure: 10 chantel; Duration: 20 sec. Inflation 6: Pressure: 10 chantel; Duration: 20 sec. Inflation 7: Pressure: 10 chantel; Duration: 20 sec. Distal lesion- 1-5 inflation Mid lesion- 6-7

## 2019-03-14 NOTE — Clinical Note
Lesion located in the Mid LAD. Balloon inserted. Balloon inflated using multiple inflations inflation technique. Pressure = 15 chantel; Duration = 18 sec. Inflation 2: Pressure: 15 chantel; Duration: 10 sec. Inflation 3: Pressure: 15 chantel; Duration: 15 sec. Inflation 4: Pressure: 18 chantel; Duration: 20 sec.

## 2019-03-14 NOTE — Clinical Note
Patient transported with a Registered Nurse and [address] [place] [address] / [facility].

## 2019-03-14 NOTE — Clinical Note
Patient transported with a Registered Nurse and [address] [place] [address] / [facility].

## 2019-03-14 NOTE — Clinical Note
Lesion located in the Mid LAD. Balloon inserted. Balloon inflated using multiple inflations inflation technique. Pressure = 10 chantel; Duration = 15 sec. Mid mid

## 2019-03-14 NOTE — Clinical Note
Sheath #1: Sheath: inserted. Sheath inserted/placed in the right femoral artery. Hemostasis achieved.

## 2019-03-14 NOTE — ED PROVIDER NOTES
EMERGENCY DEPARTMENT HISTORY AND PHYSICAL EXAM      Date: 3/14/2019  Patient Name: Silvia Gonzalez    History of Presenting Illness     Chief Complaint   Patient presents with    Chest Pain     Pt arrives via EMS for right side chest pain Pt states she has been \"feeling in a daze\" since yesterday Pt states she thinks this is related to her anxiety       History Provided By: Patient    HPI: Silvia Gonzalez, 79 y.o. female with PMHx significant for DM, COPD, Asthma, HTN presents via EMS to the ED with cc of 'drawing, pulling' intermittent CP that sometimes radiates to her back and moves from place to place x3days. Pt states sxs last ~3minutes and denies any triggers. Pt also notes some nausea with sxs. Pt endorses chronic cough. Pt denies any SOB, fever, abd pain, vomiting, or diarrhea. Pt denies any recent long travel or surgery. Pt denies any heavy lifting. She is not currently followed by a cardiologist.      Social hx: (+)tobacco, (+)EtOH, (-)illicit drugs      There are no other complaints, changes, or physical findings at this time. PCP: Hannah Payton MD    No current facility-administered medications on file prior to encounter. Current Outpatient Medications on File Prior to Encounter   Medication Sig Dispense Refill    Blood-Glucose Meter (ACCU-CHEK KRISTIE PLUS METER) misc by Does Not Apply route. 1 Each 0    glucose blood VI test strips (ACCU-CHEK KRISTIE PLUS TEST STRP) strip by Does Not Apply route See Admin Instructions. 100 Strip 3    Lancing Device with Lancets (ACCU-CHEK SOFT DEV LANCETS) kit by Does Not Apply route. 1 Kit 11    ondansetron (ZOFRAN ODT) 4 mg disintegrating tablet Take 1 Tab by mouth every six (6) hours as needed for Nausea. 30 Tab 1    chlorpheniramine-HYDROcodone (TUSSIONEX) 10-8 mg/5 mL suspension Take 5 mL by mouth every twelve (12) hours as needed for Cough. Max Daily Amount: 10 mL.  115 mL 0    albuterol (PROAIR HFA) 90 mcg/actuation inhaler Take 2 Puffs by inhalation every four (4) hours as needed for Shortness of Breath. Usually uses at least once daily. 1 Inhaler 11    oxyCODONE (OXYIR) 5 mg capsule Take 1 Cap by mouth every four (4) hours as needed. Max Daily Amount: 30 mg. 20 Cap 0    albuterol-ipratropium (DUO-NEB) 2.5 mg-0.5 mg/3 ml nebu USE 1 VIAL VIA NEBULIZER EVERY 4 HOURS AS NEEDED 1620 mL 1    budesonide-formoterol (SYMBICORT) 80-4.5 mcg/actuation HFAA Take 2 Puffs by inhalation two (2) times a day. 1 Inhaler 11    sulfacetamide (BLEPH-10) 10 % ophthalmic solution Administer 1 Drop to left eye every three (3) hours. 1 Bottle 1    losartan (COZAAR) 50 mg tablet TAKE 1 TABLET BY MOUTH DAILY 90 Tab 11    DULoxetine (CYMBALTA) 30 mg capsule TAKE 1 CAPSULE BY MOUTH DAILY 90 Cap 11    mirtazapine (REMERON) 30 mg tablet TAKE 1 TABLET BY MOUTH EVERY DAY AT BEDTIME FOR APPETITE SLEEP AND ANXIETY 90 Tab 6    traZODone (DESYREL) 50 mg tablet TAKE 1 TABLET BY MOUTH EVERY NIGHT AT BEDTIME 90 Tab 11    loratadine (CLARITIN) 10 mg tablet TAKE 1 TABLET BY MOUTH EVERY DAY 90 Tab 3    BD INSULIN SYRINGE ULTRA-FINE 1 mL 31 gauge x 5/16 syrg USE AS DIRECTED TWICE A  Syringe 0    NOVOLIN N NPH U-100 INSULIN 100 unit/mL injection INJECT 20 UNITS SUBCUTANEOUSLY TWICE A DAY 40 mL 2    cetirizine (ZYRTEC) 10 mg tablet Take 10 mg by mouth nightly.  Cholecalciferol, Vitamin D3, 50,000 unit cap Take 1 Cap by mouth Every Thursday.  omeprazole (PRILOSEC) 20 mg capsule Take 20 mg by mouth daily.  metFORMIN (GLUCOPHAGE) 1,000 mg tablet Take 1,000 mg by mouth two (2) times daily (with meals). Patient unsure of dose or frequency - Rx Query states filled 2/18 #60 for 30 day supply      metoprolol (LOPRESSOR) 25 mg tablet Take 25 mg by mouth daily.   6       Past History     Past Medical History:  Past Medical History:   Diagnosis Date    Anxiety     Asthma     COPD     Diabetes ([facility] [address]     Dyspepsia and other specified disorders of function of stomach  Hemorrhoids     Hypertension     [facility] 5/8/2018       Past Surgical History:  Past Surgical History:   Procedure Laterality Date    HX BREAST BIOPSY Left     benign    HX GYN      Thermal Ablation    HX HEART CATHETERIZATION      exploration    HX LAPAROTOMY      HX ORTHOPAEDIC      right knee    HX OTHER SURGICAL      Colonoscopy    HX TUBAL LIGATION         Family History:  Family History   Problem Relation Age of Onset    Heart Disease Father     Cancer Sister        Social History:  Social History     Tobacco Use    Smoking status: Current Every Day Smoker     Packs/day: 0.25     Years: 38.00     Pack years: 9.50    Smokeless tobacco: Never Used   Substance Use Topics    Alcohol use: Yes     Alcohol/week: 1.0 oz     Types: 2 Glasses of wine per week     Comment: occasionally    Drug use: No       Allergies: Allergies   Allergen Reactions    Buspirone Other (comments)     jittery    Tylenol-Codeine #3 [Acetaminophen-Codeine] Itching     Patient states she cannot tolerate APAP         Review of Systems   Review of Systems   Constitutional: Negative for chills and fever. HENT: Negative for congestion, rhinorrhea and sore throat. Respiratory: Positive for cough. Negative for shortness of breath. Cardiovascular: Positive for chest pain. Gastrointestinal: Positive for nausea. Negative for abdominal pain, diarrhea and vomiting. Genitourinary: Negative for dysuria and urgency. Skin: Negative for rash. Neurological: Negative for dizziness, light-headedness and headaches. All other systems reviewed and are negative. Physical Exam   Physical Exam   Constitutional: She is oriented to person, place, and time. She appears well-developed and well-nourished. No distress. HENT:   Head: Normocephalic and atraumatic. Eyes: Conjunctivae and EOM are normal. Pupils are equal, round, and reactive to light. Neck: Normal range of motion.    Cardiovascular: Normal rate, regular rhythm and intact distal pulses. Pulmonary/Chest: Effort normal and breath sounds normal. No stridor. No respiratory distress. Abdominal: Soft. She exhibits no distension. There is no tenderness. Musculoskeletal: Normal range of motion. She exhibits no edema or tenderness. Calves are symmetric   Neurological: She is alert and oriented to person, place, and time. Skin: Skin is warm and dry. Psychiatric: She has a normal mood and affect. Nursing note and vitals reviewed. Diagnostic Study Results     Labs -     Recent Results (from the past 12 hour(s))   EKG, 12 LEAD, INITIAL    Collection Time: 03/14/19  4:36 PM   Result Value Ref Range    Ventricular Rate 106 BPM    Atrial Rate 106 BPM    P-R Interval 174 ms    QRS Duration 76 ms    Q-T Interval 342 ms    QTC Calculation (Bezet) 454 ms    Calculated P Axis 47 degrees    Calculated R Axis 17 degrees    Calculated T Axis 161 degrees    Diagnosis       Sinus tachycardia  Anterior infarct , age undetermined  ST & T wave abnormality, consider inferolateral ischemia  When compared with ECG of 12-FEB-2018 13:20,  T wave inversion now evident in Inferior leads  T wave inversion now evident in Anterolateral leads     CBC WITH AUTOMATED DIFF    Collection Time: 03/14/19  4:52 PM   Result Value Ref Range    WBC 5.0 3.6 - 11.0 K/uL    RBC 4.11 3.80 - 5.20 M/uL    HGB 13.1 11.5 - 16.0 g/dL    HCT 37.5 35.0 - 47.0 %    MCV 91.2 80.0 - 99.0 FL    MCH 31.9 26.0 - 34.0 PG    MCHC 34.9 30.0 - 36.5 g/dL    RDW 11.6 11.5 - 14.5 %    PLATELET 013 949 - 027 K/uL    MPV 10.3 8.9 - 12.9 FL    NRBC 0.0 0  WBC    ABSOLUTE NRBC 0.00 0.00 - 0.01 K/uL    NEUTROPHILS 53 32 - 75 %    LYMPHOCYTES 35 12 - 49 %    MONOCYTES 8 5 - 13 %    EOSINOPHILS 3 0 - 7 %    BASOPHILS 1 0 - 1 %    IMMATURE GRANULOCYTES 0 0.0 - 0.5 %    ABS. NEUTROPHILS 2.6 1.8 - 8.0 K/UL    ABS. LYMPHOCYTES 1.8 0.8 - 3.5 K/UL    ABS. MONOCYTES 0.4 0.0 - 1.0 K/UL    ABS.  EOSINOPHILS 0.1 0.0 - 0.4 K/UL    ABS. BASOPHILS 0.1 0.0 - 0.1 K/UL    ABS. IMM. GRANS. 0.0 0.00 - 0.04 K/UL    DF AUTOMATED     METABOLIC PANEL, COMPREHENSIVE    Collection Time: 03/14/19  4:52 PM   Result Value Ref Range    Sodium 142 136 - 145 mmol/L    Potassium 3.9 3.5 - 5.1 mmol/L    Chloride 108 97 - 108 mmol/L    CO2 25 21 - 32 mmol/L    Anion gap 9 5 - 15 mmol/L    Glucose 159 (H) 65 - 100 mg/dL    BUN 6 6 - 20 MG/DL    Creatinine 0.95 0.55 - 1.02 MG/DL    BUN/Creatinine ratio 6 (L) 12 - 20      GFR est AA >60 >60 ml/min/1.73m2    GFR est non-AA 59 (L) >60 ml/min/1.73m2    Calcium 8.5 8.5 - 10.1 MG/DL    Bilirubin, total 0.6 0.2 - 1.0 MG/DL    ALT (SGPT) 22 12 - 78 U/L    AST (SGOT) 19 15 - 37 U/L    Alk. phosphatase 114 45 - 117 U/L    Protein, total 7.6 6.4 - 8.2 g/dL    Albumin 3.6 3.5 - 5.0 g/dL    Globulin 4.0 2.0 - 4.0 g/dL    A-G Ratio 0.9 (L) 1.1 - 2.2     TROPONIN I    Collection Time: 03/14/19  4:52 PM   Result Value Ref Range    Troponin-I, Qt. <0.05 <0.05 ng/mL   SAMPLES BEING HELD    Collection Time: 03/14/19  4:52 PM   Result Value Ref Range    SAMPLES BEING HELD RD BLUE     COMMENT        Add-on orders for these samples will be processed based on acceptable specimen integrity and analyte stability, which may vary by analyte. Radiologic Studies -   XR CHEST PA LAT   Final Result   IMPRESSION: No acute intrathoracic disease. CT Results  (Last 48 hours)    None        CXR Results  (Last 48 hours)               03/14/19 1722  XR CHEST PA LAT Final result    Impression:  IMPRESSION: No acute intrathoracic disease. Narrative:  CLINICAL HISTORY: Chest pain    INDICATION: Chest pain and hypertension       COMPARISON: 6/25/2018       FINDINGS:    PA and lateral views of the chest are obtained. The cardiopericardial silhouette is within normal limits. There is no pleural   effusion, pneumothorax or focal consolidation present.                    Medical Decision Making   I am the first provider for this patient. I reviewed the vital signs, available nursing notes, past medical history, past surgical history, family history and social history. Vital Signs-Reviewed the patient's vital signs. Patient Vitals for the past 12 hrs:   Temp Pulse Resp BP SpO2   03/14/19 2029  95   99 %   03/14/19 2000  95  138/84 99 %   03/14/19 1900 98.5 °F (36.9 °C) 96 16 120/70 99 %   03/14/19 1821  (!) 106 15 (!) 143/94 99 %   03/14/19 1705 98.8 °F (37.1 °C) (!) 101 16 143/87 98 %   03/14/19 1700  97  143/87 98 %       Pulse Oximetry Analysis - 98% on RA    Cardiac Monitor:   Rate: 101 bpm    EKG interpretation: (Preliminary)1636  Rhythm: sinus tachycardia; and regular . Rate (approx.): 106; Axis: normal; LA interval: normal; QRS interval: normal ; ST/T wave: T wave inversions in leads V3 through V6 which are new compared to prior EKG; Other findings: infarct anterior, age undetermined. Records Reviewed: Nursing Notes and Old Medical Records    Provider Notes (Medical Decision Making):   Patient presents with intermittent, fleeting, chest pain x3 days. Differential includes ACS, angina, musculoskeletal chest pain, COPD exacerbation, costochondritis, less likely pneumonia given her infectious symptoms. Plan for basic labs, troponin, EKG, chest x-ray. ED Course:   Initial assessment performed. The patients presenting problems have been discussed, and they are in agreement with the care plan formulated and outlined with them. I have encouraged them to ask questions as they arise throughout their visit. EKG with new T wave inversions as compared to prior. Initial troponin negative. Will discuss with cardiology given EKG changes. Consult Note:  Jayesh Marroquin Bltorie. Meena Narayan MD spoke with Dr. Clari Miller,  Specialty: Cardiology  Discussed pt's hx, disposition, and available diagnostic and imaging results. Reviewed care plans. Consultant agrees with plans as outlined.  Will consult in ED      Critical Care Time:   0    Disposition:  10:23 PM  Patient is being admitted to the hospital by Dr. Damion Henderson. The results of their tests and reasons for their admission have been discussed with them and/or available family. They convey agreement and understanding for the need to be admitted and for their admission diagnosis. Consultation has been made with the inpatient physician specialist for hospitalization. PLAN:  1. Admit to cardiology  `  Diagnosis     Clinical Impression:   1. Unstable angina ([address]        Attestations: This note is prepared by Dinorah Nguyen, acting as Scribe for Lore Mahan. MD Otto.    The scribe's documentation has been prepared under my direction and personally reviewed by me in its entirety. I confirm that the note above accurately reflects all work, treatment, procedures, and medical decision making performed by me, Lore Mhaan.  Romero Palacio MD

## 2019-03-14 NOTE — Clinical Note
Lesion: Located in the Mid LAD. Stent inserted. Single technique and multiple inflations used. First inflation pressure = 15 chantel; inflation time: 18 sec. Second inflation pressure: 16 chantel; inflation time: 12 sec.

## 2019-03-14 NOTE — Clinical Note
Lesion located in the Mid LAD. Balloon inflated using single inflation technique. Pressure = 14 chanetl; Duration = 20 sec. Prox. lesion

## 2019-03-14 NOTE — ED NOTES
Bedside shift change report given to Ria Grove (oncoming nurse) by self (offgoing nurse). Report included the following information SBAR, Kardex, ED Summary and MAR.

## 2019-03-14 NOTE — Clinical Note
Lesion located in the Mid LAD. Balloon inserted. Balloon inflated using multiple inflations inflation technique.

## 2019-03-14 NOTE — Clinical Note
Lesion: Located in the Proximal LAD. Single technique used. First inflation pressure = 15 chantel; inflation time: 25 sec.

## 2019-03-14 NOTE — Clinical Note
Lesion: Located in the Mid LAD. Stent inserted. Multiple inflations used. First inflation pressure = 15 chantel; inflation time: 25 sec. Second inflation pressure: 15 chantel; inflation time: 10 sec.  Distal mid

## 2019-03-15 LAB
ATRIAL RATE: 106 BPM
CALCULATED P AXIS, ECG09: 47 DEGREES
CALCULATED R AXIS, ECG10: 17 DEGREES
CALCULATED T AXIS, ECG11: 161 DEGREES
DIAGNOSIS, 93000: NORMAL
END DIASTOLIC PRESSURE: 8
GLUCOSE BLD STRIP.AUTO-MCNC: 120 MG/DL (ref 65–100)
GLUCOSE BLD STRIP.AUTO-MCNC: 145 MG/DL (ref 65–100)
GLUCOSE BLD STRIP.AUTO-MCNC: 163 MG/DL (ref 65–100)
P-R INTERVAL, ECG05: 174 MS
Q-T INTERVAL, ECG07: 342 MS
QRS DURATION, ECG06: 76 MS
QTC CALCULATION (BEZET), ECG08: 454 MS
SERVICE CMNT-IMP: ABNORMAL
TROPONIN I SERPL-MCNC: <0.05 NG/ML
VENTRICULAR RATE, ECG03: 106 BPM

## 2019-03-15 PROCEDURE — C1874 STENT, COATED/COV W/DEL SYS: HCPCS

## 2019-03-15 PROCEDURE — 77030029065 HC DRSG HEMO QCLOT ZMED -B: Performed by: INTERNAL MEDICINE

## 2019-03-15 PROCEDURE — C1769 GUIDE WIRE: HCPCS | Performed by: INTERNAL MEDICINE

## 2019-03-15 PROCEDURE — 74011000250 HC RX REV CODE- 250: Performed by: INTERNAL MEDICINE

## 2019-03-15 PROCEDURE — 77030029065 HC DRSG HEMO QCLOT ZMED -B

## 2019-03-15 PROCEDURE — C1760 CLOSURE DEV, VASC: HCPCS | Performed by: INTERNAL MEDICINE

## 2019-03-15 PROCEDURE — C1769 GUIDE WIRE: HCPCS

## 2019-03-15 PROCEDURE — C1887 CATHETER, GUIDING: HCPCS

## 2019-03-15 PROCEDURE — 94760 N-INVAS EAR/PLS OXIMETRY 1: CPT

## 2019-03-15 PROCEDURE — C1760 CLOSURE DEV, VASC: HCPCS

## 2019-03-15 PROCEDURE — C1725 CATH, TRANSLUMIN NON-LASER: HCPCS

## 2019-03-15 PROCEDURE — C1894 INTRO/SHEATH, NON-LASER: HCPCS | Performed by: INTERNAL MEDICINE

## 2019-03-15 PROCEDURE — 77030019697 HC SYR ANGI INFL MRTM -B

## 2019-03-15 PROCEDURE — 82962 GLUCOSE BLOOD TEST: CPT

## 2019-03-15 PROCEDURE — 74011250636 HC RX REV CODE- 250/636: Performed by: INTERNAL MEDICINE

## 2019-03-15 PROCEDURE — 74011636320 HC RX REV CODE- 636/320: Performed by: INTERNAL MEDICINE

## 2019-03-15 PROCEDURE — C1887 CATHETER, GUIDING: HCPCS | Performed by: INTERNAL MEDICINE

## 2019-03-15 PROCEDURE — 99152 MOD SED SAME PHYS/QHP 5/>YRS: CPT | Performed by: INTERNAL MEDICINE

## 2019-03-15 PROCEDURE — C1725 CATH, TRANSLUMIN NON-LASER: HCPCS | Performed by: INTERNAL MEDICINE

## 2019-03-15 PROCEDURE — 92928 PRQ TCAT PLMT NTRAC ST 1 LES: CPT | Performed by: INTERNAL MEDICINE

## 2019-03-15 PROCEDURE — 74011250637 HC RX REV CODE- 250/637: Performed by: INTERNAL MEDICINE

## 2019-03-15 PROCEDURE — 85347 COAGULATION TIME ACTIVATED: CPT

## 2019-03-15 PROCEDURE — 93458 L HRT ARTERY/VENTRICLE ANGIO: CPT | Performed by: INTERNAL MEDICINE

## 2019-03-15 PROCEDURE — 99153 MOD SED SAME PHYS/QHP EA: CPT | Performed by: INTERNAL MEDICINE

## 2019-03-15 PROCEDURE — 77030028837 HC SYR ANGI PWR INJ COEU -A: Performed by: INTERNAL MEDICINE

## 2019-03-15 PROCEDURE — 36415 COLL VENOUS BLD VENIPUNCTURE: CPT

## 2019-03-15 PROCEDURE — C1894 INTRO/SHEATH, NON-LASER: HCPCS

## 2019-03-15 PROCEDURE — 77030019697 HC SYR ANGI INFL MRTM -B: Performed by: INTERNAL MEDICINE

## 2019-03-15 PROCEDURE — 65660000000 HC RM CCU STEPDOWN

## 2019-03-15 PROCEDURE — C1874 STENT, COATED/COV W/DEL SYS: HCPCS | Performed by: INTERNAL MEDICINE

## 2019-03-15 PROCEDURE — 74011250636 HC RX REV CODE- 250/636

## 2019-03-15 PROCEDURE — 93005 ELECTROCARDIOGRAM TRACING: CPT

## 2019-03-15 PROCEDURE — 77030028837 HC SYR ANGI PWR INJ COEU -A

## 2019-03-15 PROCEDURE — 84484 ASSAY OF TROPONIN QUANT: CPT

## 2019-03-15 DEVICE — STENT RSINT27512UX MICROTRAC 2.75X12RX
Type: IMPLANTABLE DEVICE | Status: FUNCTIONAL
Brand: RESOLUTE INTEGRITY™

## 2019-03-15 DEVICE — STENT RONYX20026UX RESOLUTE ONYX 2.00X26
Type: IMPLANTABLE DEVICE | Status: FUNCTIONAL
Brand: RESOLUTE ONYX™

## 2019-03-15 DEVICE — STENT RONYX20015UX RESOLUTE ONYX 2.00X15
Type: IMPLANTABLE DEVICE | Status: FUNCTIONAL
Brand: RESOLUTE ONYX™

## 2019-03-15 RX ORDER — HEPARIN SODIUM 1000 [USP'U]/ML
INJECTION, SOLUTION INTRAVENOUS; SUBCUTANEOUS AS NEEDED
Status: DISCONTINUED | OUTPATIENT
Start: 2019-03-15 | End: 2019-03-15

## 2019-03-15 RX ORDER — LIDOCAINE HYDROCHLORIDE 10 MG/ML
INJECTION, SOLUTION EPIDURAL; INFILTRATION; INTRACAUDAL; PERINEURAL AS NEEDED
Status: DISCONTINUED | OUTPATIENT
Start: 2019-03-15 | End: 2019-03-15

## 2019-03-15 RX ORDER — SODIUM CHLORIDE 9 MG/ML
100 INJECTION, SOLUTION INTRAVENOUS CONTINUOUS
Status: DISPENSED | OUTPATIENT
Start: 2019-03-15 | End: 2019-03-15

## 2019-03-15 RX ORDER — GUAIFENESIN 100 MG/5ML
81 LIQUID (ML) ORAL DAILY
Status: SHIPPED | COMMUNITY
Start: 2019-03-16 | End: 2019-03-18 | Stop reason: SDUPTHER

## 2019-03-15 RX ORDER — HEPARIN SODIUM 200 [USP'U]/100ML
INJECTION, SOLUTION INTRAVENOUS
Status: DISCONTINUED | OUTPATIENT
Start: 2019-03-15 | End: 2019-03-15

## 2019-03-15 RX ORDER — LORAZEPAM 1 MG/1
1 TABLET ORAL
Status: DISCONTINUED | OUTPATIENT
Start: 2019-03-15 | End: 2019-03-16 | Stop reason: HOSPADM

## 2019-03-15 RX ORDER — FENTANYL CITRATE 50 UG/ML
INJECTION, SOLUTION INTRAMUSCULAR; INTRAVENOUS AS NEEDED
Status: DISCONTINUED | OUTPATIENT
Start: 2019-03-15 | End: 2019-03-15

## 2019-03-15 RX ORDER — ZOLPIDEM TARTRATE 5 MG/1
5 TABLET ORAL
Status: DISCONTINUED | OUTPATIENT
Start: 2019-03-15 | End: 2019-03-16 | Stop reason: HOSPADM

## 2019-03-15 RX ORDER — ASPIRIN 325 MG
325 TABLET ORAL DAILY
Status: DISCONTINUED | OUTPATIENT
Start: 2019-03-16 | End: 2019-03-15

## 2019-03-15 RX ORDER — SODIUM CHLORIDE 9 MG/ML
100 INJECTION, SOLUTION INTRAVENOUS CONTINUOUS
Status: DISCONTINUED | OUTPATIENT
Start: 2019-03-15 | End: 2019-03-16 | Stop reason: HOSPADM

## 2019-03-15 RX ORDER — MIDAZOLAM HYDROCHLORIDE 1 MG/ML
INJECTION, SOLUTION INTRAMUSCULAR; INTRAVENOUS AS NEEDED
Status: DISCONTINUED | OUTPATIENT
Start: 2019-03-15 | End: 2019-03-15

## 2019-03-15 RX ORDER — ATORVASTATIN CALCIUM 40 MG/1
40 TABLET, FILM COATED ORAL
Qty: 30 TAB | Refills: 12 | Status: SHIPPED | OUTPATIENT
Start: 2019-03-15 | End: 2020-04-03

## 2019-03-15 RX ORDER — ONDANSETRON 2 MG/ML
4 INJECTION INTRAMUSCULAR; INTRAVENOUS
Status: DISCONTINUED | OUTPATIENT
Start: 2019-03-15 | End: 2019-03-16 | Stop reason: HOSPADM

## 2019-03-15 RX ORDER — SODIUM CHLORIDE 0.9 % (FLUSH) 0.9 %
5-40 SYRINGE (ML) INJECTION AS NEEDED
Status: DISCONTINUED | OUTPATIENT
Start: 2019-03-15 | End: 2019-03-16 | Stop reason: HOSPADM

## 2019-03-15 RX ORDER — SODIUM CHLORIDE 0.9 % (FLUSH) 0.9 %
5-40 SYRINGE (ML) INJECTION EVERY 8 HOURS
Status: DISCONTINUED | OUTPATIENT
Start: 2019-03-15 | End: 2019-03-16 | Stop reason: HOSPADM

## 2019-03-15 RX ADMIN — ATORVASTATIN CALCIUM 40 MG: 40 TABLET, FILM COATED ORAL at 23:10

## 2019-03-15 RX ADMIN — Medication 10 ML: at 04:36

## 2019-03-15 RX ADMIN — ONDANSETRON 4 MG: 2 INJECTION INTRAMUSCULAR; INTRAVENOUS at 19:12

## 2019-03-15 RX ADMIN — LORAZEPAM 1 MG: 1 TABLET ORAL at 23:10

## 2019-03-15 RX ADMIN — MIRTAZAPINE 30 MG: 15 TABLET, FILM COATED ORAL at 23:10

## 2019-03-15 RX ADMIN — SODIUM CHLORIDE 100 ML/HR: 900 INJECTION, SOLUTION INTRAVENOUS at 08:29

## 2019-03-15 RX ADMIN — Medication 10 ML: at 23:09

## 2019-03-15 RX ADMIN — LOSARTAN POTASSIUM 50 MG: 50 TABLET ORAL at 08:30

## 2019-03-15 RX ADMIN — METOPROLOL TARTRATE 12.5 MG: 25 TABLET ORAL at 08:30

## 2019-03-15 RX ADMIN — ASPIRIN 81 MG 81 MG: 81 TABLET ORAL at 08:30

## 2019-03-15 RX ADMIN — CETIRIZINE HYDROCHLORIDE 10 MG: 10 TABLET, FILM COATED ORAL at 23:10

## 2019-03-15 RX ADMIN — FLUTICASONE FUROATE AND VILANTEROL TRIFENATATE 1 PUFF: 100; 25 POWDER RESPIRATORY (INHALATION) at 08:28

## 2019-03-15 RX ADMIN — METOPROLOL TARTRATE 12.5 MG: 25 TABLET ORAL at 17:40

## 2019-03-15 RX ADMIN — PANTOPRAZOLE SODIUM 40 MG: 40 TABLET, DELAYED RELEASE ORAL at 08:29

## 2019-03-15 RX ADMIN — TICAGRELOR 90 MG: 90 TABLET ORAL at 23:10

## 2019-03-15 RX ADMIN — DULOXETINE HYDROCHLORIDE 30 MG: 30 CAPSULE, DELAYED RELEASE ORAL at 08:30

## 2019-03-15 NOTE — PROGRESS NOTES
This assessment was completed with son, Andres Parekh. Patient is presently off the unit. Reason for Admission:   Unstable angina                  RRAT Score:    18              Do you (patient/family) have any concerns for transition/discharge? None identified              Plan for utilizing home health:     tbd    Likelihood of readmission? Mod to high            Transition of Care Plan:      Follow up appointments. hh tbd    CM met with son to review demographic information. Pt name and  confirmed. Demographics confirmed. Patient is  and lives with other adults in a single story home. Steps for entry. Son reports patient is independent prior to admission to include steps and driving. DME - none  Preferred Rx - [facility]    Care Management Interventions  PCP Verified by CM: Yes  Mode of Transport at Discharge: Other (see comment)(family)  Transition of Care Consult (CM Consult): Discharge Planning  Discharge Durable Medical Equipment: No  Physical Therapy Consult: No  Occupational Therapy Consult: No  Speech Therapy Consult: No  Current Support Network:  Other(lives with other adults)  Confirm Follow Up Transport: Family  Plan discussed with Pt/Family/Caregiver: No  Discharge Location  Discharge Placement: Rodri Jenkins RN CM  Ext [phone]

## 2019-03-15 NOTE — ED NOTES
TRANSFER - OUT REPORT:    Verbal report given to [address] [place] [address] (name) on Dmitry Owusu  being transferred to [facility] room 2163 (unit) for routine progression of care       Report consisted of patients Situation, Background, Assessment and   Recommendations(SBAR). Information from the following report(s) SBAR, ED Summary, Procedure Summary, MAR, Recent Results and Cardiac Rhythm Sinus Rhy was reviewed with the receiving nurse. Lines:   Peripheral IV 03/14/19 Left Wrist (Active)   Site Assessment Clean, dry, & intact 3/14/2019  5:06 PM   Phlebitis Assessment 0 3/14/2019  5:06 PM   Infiltration Assessment 0 3/14/2019  5:06 PM   Dressing Status Clean, dry, & intact 3/14/2019  5:06 PM        Opportunity for questions and clarification was provided.       Patient transported with:   Monitor  Registered Nurse

## 2019-03-15 NOTE — PROGRESS NOTES
Patient is off unit. 2nd IM letter provided to Esau Braxton, son. Explanation provided. Copy on chart and to patient's son. No signature.     Alan Chavez RN CM  Ext [phone]

## 2019-03-15 NOTE — PROGRESS NOTES
Problem: Falls - Risk of  Goal: *Absence of Falls  Document Edd Fall Risk and appropriate interventions in the flowsheet.   Outcome: Progressing Towards Goal  Fall Risk Interventions:

## 2019-03-15 NOTE — PROGRESS NOTES
H&P dictated, #[id number]. Unstable angina with negative initial troponin but ischemic ECG changes. High probability of CAD by risk profile. Plan is to admit, medically manage overnight, NPO for cath tomorrow.     Signed By: Joshua Zuleta MD     March 14, 2019

## 2019-03-15 NOTE — H&P
[address]  HISTORY AND PHYSICAL    Name:  Keila Jeffrey  MR#:  [id number]  :  1950  ACCOUNT #:  [de-identified]  ADMIT DATE:  2019    CHIEF COMPLAINT:  Chest pain. HISTORY OF PRESENT ILLNESS:  This is a 80-year-old female with a history of intermittent chest discomfort that has been occurring over the last few weeks. She says that she has been under a lot of stress lately. The symptoms have escalated to the point where she has sought medical care in the emergency room. The character of the discomfort is described as pulling and the sensation actually radiates straight to her back and migrates. The last 3 days has been more notable. The symptoms typically lasts several minutes and then resolve on their own. Associated symptoms include nausea. She has had a prior cardiac evaluation at least 10 years ago. She cannot remember the name of her cardiologist.  It appears this may have been done in the [facility] system. She denies syncope, dizziness, palpitations. No heart failure symptoms. Some shortness of breath that is typical for her. No TIA or stroke-like symptoms. ALLERGIES:  1.  BUSPIRONE.  2.  TYLENOL WITH CODEINE. MEDICATIONS:  Multiple medications were noted. Please see the hospital list.    PAST MEDICAL HISTORY:  1. COPD. 2.  Diabetes mellitus type 2, requiring chronic insulin. 3.  Hypertensive heart disease without heart failure. 4.  Anxiety disorder. 5.  Hemorrhoids with a history of gastrointestinal bleeding evaluated in 2017. This was believed to be hemorrhoidal with plans made for outpatient evaluation at that time. 6.  Rheumatoid arthritis. FAMILY HISTORY:  Noncontributory. SOCIAL HISTORY:  Active smoker. REVIEW OF SYSTEMS:  A complete review of systems was obtained from the patient, reviewing across 13 body systems.   This included constitutional, ENT, respiratory, cardiovascular, gastrointestinal, genitourinary, skin, neurologic, psychiatric, rheumatologic. PHYSICAL EXAMINATION:  VITAL SIGNS:  Temperature 98.8 Fahrenheit, pulse 101, blood pressure 143/87, respirations 16, oxygen saturation 98% on room air. Weight 60.3 kg. GENERAL:  Nondiaphoretic, not in acute distress. HEENT:  No scleral icterus, mucous membranes are moist, conjunctivae are pink. RESPIRATORY:  Unlabored, clear to auscultation bilaterally anteriorly, faint rhonchi. Overall symmetric air movement. CARDIAC:  Regular, no murmur, rub or gallop. No jugular venous distention or carotid bruits. Palpable radial pulses bilaterally. ABDOMEN:  Soft, nontender, nondistended. Slender habitus. NEUROLOGIC:  Awake, appropriate, grossly nonfocal.  No resting tremor. SKIN:  No jaundice, petechiae, purpura. DIAGNOSTIC DATA:  ECG in the emergency room showed sinus tachycardia at a rate of 106 beats per minute. There is suggestion of an old anterior infarct pattern. Q-wave inversion is present in the inferior and anterolateral leads. This is a new finding since the last EKG 02/12/2018. Chest x-ray did not show pulmonary edema in the emergency room. LABORATORY DATA:  Sodium 142, potassium 3.9, BUN 6, creatinine 0.95, calcium 8.5, total bilirubin 0.6, albumin 3.6, ALT 22, AST 19, alkaline phosphatase 114. Troponin less than 0.05. Hemoglobin A1c from 10/2018 was 6.5. White blood cell 5.0, hemoglobin 13.1, hematocrit 37.5, platelets 065. The last occult blood testing of the stool was positive in 03/2017. An evaluation was done in the hospital, please see the notes. IMPRESSION:  1. Chest pain syndrome consistent with unstable angina. The troponin initially was negative, but there are ischemic ECG changes. I am suspicious that this is the left anterior descending artery. 2.  Chronic obstructive pulmonary disease. 3.  Active tobacco smoking. 4.  Diabetes mellitus type 2, requiring chronic insulin. 5.  Hypertensive heart disease without heart failure.   6. Anxiety disorder. .  7.  Remote history of gastrointestinal bleeding. Currently, she is not anemic. This is thought to be hemorrhoidal.  8.  Rheumatoid arthritis. RECOMMENDATIONS:  1.  I explained to her the necessity of being admitted to the hospital.  2.  She will get baby aspirin, Lovenox, nitroglycerin, and oxygen. 3.  Tobacco cessation counseling was performed. 3.  I will start statin therapy. 4.  I will talk to one of my partners and if cardiac catheterization can be performed on her to evaluate her coronary anatomy and hopefully treat what appears to be her presenting problem.         Kirk Krueger MD      DS/V_[id number]  D:  03/14/2019 21:52  T:  03/15/2019 2:17  JOB #:  [id number]  CC:  Kassy Lopez MD

## 2019-03-15 NOTE — PROGRESS NOTES
Progress Note      3/15/2019 9:23 AM  NAME: Eligio Boss   MRN:  [id number]   Admit Diagnosis: Unstable angina ([facility] [I20.0]     Assessment:       IMPRESSION:  1. Chest pain syndrome consistent with unstable angina. The troponin initially was negative, but there are ischemic ECG changes. I am suspicious that this is the left 2. Chronic obstructive pulmonary disease. 3.  Active tobacco smoking. 4.  Diabetes mellitus type 2, requiring chronic insulin. 5.  Hypertensive heart disease without heart failure. 6.  Anxiety disorder. .  7.  Remote history of gastrointestinal bleeding. Currently, she is not anemic. This is thought to be hemorrhoidal.  8.  Rheumatoid arthritis.             Plan:     Reviewed and examined. We are going to do a cardiac cath this afternoon , with PCI if indicated. Benefits , risks reviewed with her . [x]        High complexity decision making was performed    Subjective:     Eligio Boss denies chest pain, dyspnea. Discussed with RN events overnight. Patient Active Problem List   Diagnosis Code    Osteoarthritis of finger of right hand M19.041    GI bleed K92.2    Rheumatoid arthritis ([facility] M06.9    Type 2 diabetes mellitus without complication, with long-term current use of insulin (HCC) E11.9, Z79.4    Hypertension I10    Anxiety F41.9    Unstable angina ([facility]) I20.0       Review of Systems:    Symptom Y/N Comments  Symptom Y/N Comments   Fever/Chills N   Chest Pain N    Poor Appetite N   Edema N    Cough N   Abdominal Pain N    Sputum N   Joint Pain N    SOB/GARCIA N   Pruritis/Rash N    Nausea/vomit N   Tolerating PT/OT Y    Diarrhea N   Tolerating Diet Y    Constipation N   Other       Could NOT obtain due to:      Objective:      Physical Exam:    Last 24hrs VS reviewed since prior progress note.  Most recent are:    Visit Vitals  /79   Pulse 83   Temp 98.3 °F (36.8 °C)   Resp 16   Ht 5' 4\" (1.626 m)   Wt 60.3 kg (133 lb)   SpO2 98%   BMI 22.83 kg/m²     No intake or output data in the 24 hours ending 03/15/19 0923     General Appearance: Well developed, well nourished, alert & oriented x 3,    no acute distress. Ears/Nose/Mouth/Throat: Hearing grossly normal.  Neck: Supple. Chest: Lungs clear to auscultation bilaterally. Cardiovascular: Regular rate and rhythm, S1S2 normal, no murmur. Abdomen: Soft, non-tender, bowel sounds are active. Extremities: No edema bilaterally. Skin: Warm and dry. PMH/SH reviewed - no change compared to H&P    Data Review    Telemetry: normal sinus rhythm     Lab Data Personally Reviewed:    Recent Labs     03/14/19  1652   WBC 5.0   HGB 13.1   HCT 37.5      LABRCNT(INR:3,PTP:3,APTT:3,)  Recent Labs     03/14/19  1652      K 3.9      CO2 25   BUN 6   CREA 0.95   *   CA 8.5   LABRCNT(CPK:3,CpKMB:3,ckndx:3,troiq:3)  Lab Results   Component Value Date/Time    Cholesterol, total 190 10/29/2018 12:13 PM    HDL Cholesterol 62 10/29/2018 12:13 PM    LDL, calculated 75 10/29/2018 12:13 PM    Triglyceride 264 (H) 10/29/2018 12:13 PM   LABRCNT(sgot:3,gpt:3,ap:3,tbiL:3,TP:3,ALB:3,GLOB:3,ggt:3,aml:3,amyp:3,lpse:3,hlpse:3)No results for input(s): PH, PCO2, PO2 in the last 72 hours. Lab Results   Component Value Date/Time    Cholesterol, total 190 10/29/2018 12:13 PM    HDL Cholesterol 62 10/29/2018 12:13 PM    LDL, calculated 75 10/29/2018 12:13 PM    Triglyceride 264 (H) 10/29/2018 12:13 PM   MEDTABLEAleksandr Ryan MD  No results for input(s): PH, PCO2, PO2 in the last 72 hours.     Medications Personally Reviewed:    Current Facility-Administered Medications   Medication Dose Route Frequency    0.9% sodium chloride infusion  100 mL/hr IntraVENous CONTINUOUS    LORazepam (ATIVAN) tablet 1 mg  1 mg Oral Q12H PRN    metoprolol tartrate (LOPRESSOR) tablet 12.5 mg  12.5 mg Oral BID    cetirizine (ZYRTEC) tablet 10 mg  10 mg Oral QHS    pantoprazole (PROTONIX) tablet 40 mg  40 mg Oral ACB    mirtazapine (REMERON) tablet 30 mg  30 mg Oral QHS    . PHARMACY TO SUBSTITUTE PER PROTOCOL (Reordered from: sulfacetamide (BLEPH-10) 10 % ophthalmic solution)    Per Protocol    losartan (COZAAR) tablet 50 mg  50 mg Oral DAILY    DULoxetine (CYMBALTA) capsule 30 mg  30 mg Oral DAILY    oxyCODONE IR (ROXICODONE) tablet 5 mg  5 mg Oral Q4H PRN    albuterol-ipratropium (DUO-NEB) 2.5 MG-0.5 MG/3 ML  3 mL Nebulization Q6H PRN    fluticasone-vilanterol (BREO ELLIPTA) 100mcg-25mcg/puff  1 Puff Inhalation DAILY    albuterol (PROVENTIL HFA, VENTOLIN HFA, PROAIR HFA) inhaler 2 Puff  2 Puff Inhalation Q6H PRN    insulin lispro (HUMALOG) injection   SubCUTAneous ACB&D    glucose chewable tablet 16 g  4 Tab Oral PRN    dextrose (D50W) injection syrg 12.5-25 g  12.5-25 g IntraVENous PRN    glucagon (GLUCAGEN) injection 1 mg  1 mg IntraMUSCular PRN    LORazepam (ATIVAN) tablet 0.5 mg  0.5 mg Oral Q6H PRN    nitroglycerin (NITROBID) 2 % ointment 0.5 Inch  0.5 Inch Topical TID    sodium chloride (NS) flush 5-40 mL  5-40 mL IntraVENous Q8H    sodium chloride (NS) flush 5-40 mL  5-40 mL IntraVENous PRN    acetaminophen (TYLENOL) tablet 650 mg  650 mg Oral Q4H PRN    aspirin chewable tablet 81 mg  81 mg Oral DAILY    magnesium hydroxide (MILK OF MAGNESIA) 400 mg/5 mL oral suspension 30 mL  30 mL Oral DAILY PRN    atorvastatin (LIPITOR) tablet 40 mg  40 mg Oral QHS         Fracisco Moss MD

## 2019-03-16 VITALS
TEMPERATURE: 98.2 F | OXYGEN SATURATION: 98 % | HEART RATE: 71 BPM | DIASTOLIC BLOOD PRESSURE: 70 MMHG | HEIGHT: 64 IN | SYSTOLIC BLOOD PRESSURE: 119 MMHG | WEIGHT: 133 LBS | RESPIRATION RATE: 16 BRPM | BODY MASS INDEX: 22.71 KG/M2

## 2019-03-16 LAB
ANION GAP SERPL CALC-SCNC: 11 MMOL/L (ref 5–15)
ATRIAL RATE: 67 BPM
BASOPHILS # BLD: 0 K/UL (ref 0–0.1)
BASOPHILS NFR BLD: 1 % (ref 0–1)
BUN SERPL-MCNC: 8 MG/DL (ref 6–20)
BUN/CREAT SERPL: 12 (ref 12–20)
CALCIUM SERPL-MCNC: 7.7 MG/DL (ref 8.5–10.1)
CALCULATED P AXIS, ECG09: 64 DEGREES
CALCULATED R AXIS, ECG10: 25 DEGREES
CALCULATED T AXIS, ECG11: -116 DEGREES
CHLORIDE SERPL-SCNC: 111 MMOL/L (ref 97–108)
CO2 SERPL-SCNC: 19 MMOL/L (ref 21–32)
CREAT SERPL-MCNC: 0.67 MG/DL (ref 0.55–1.02)
DIAGNOSIS, 93000: NORMAL
DIFFERENTIAL METHOD BLD: ABNORMAL
EOSINOPHIL # BLD: 0.1 K/UL (ref 0–0.4)
EOSINOPHIL NFR BLD: 1 % (ref 0–7)
ERYTHROCYTE [DISTWIDTH] IN BLOOD BY AUTOMATED COUNT: 11.9 % (ref 11.5–14.5)
GLUCOSE BLD STRIP.AUTO-MCNC: 141 MG/DL (ref 65–100)
GLUCOSE SERPL-MCNC: 146 MG/DL (ref 65–100)
HCT VFR BLD AUTO: 28.4 % (ref 35–47)
HGB BLD-MCNC: 9.7 G/DL (ref 11.5–16)
IMM GRANULOCYTES # BLD AUTO: 0 K/UL (ref 0–0.04)
IMM GRANULOCYTES NFR BLD AUTO: 0 % (ref 0–0.5)
LYMPHOCYTES # BLD: 1.2 K/UL (ref 0.8–3.5)
LYMPHOCYTES NFR BLD: 19 % (ref 12–49)
MCH RBC QN AUTO: 32.8 PG (ref 26–34)
MCHC RBC AUTO-ENTMCNC: 34.2 G/DL (ref 30–36.5)
MCV RBC AUTO: 95.9 FL (ref 80–99)
MONOCYTES # BLD: 0.5 K/UL (ref 0–1)
MONOCYTES NFR BLD: 8 % (ref 5–13)
NEUTS SEG # BLD: 4.4 K/UL (ref 1.8–8)
NEUTS SEG NFR BLD: 70 % (ref 32–75)
NRBC # BLD: 0 K/UL (ref 0–0.01)
NRBC BLD-RTO: 0 PER 100 WBC
P-R INTERVAL, ECG05: 196 MS
PLATELET # BLD AUTO: 150 K/UL (ref 150–400)
PMV BLD AUTO: 11.2 FL (ref 8.9–12.9)
POTASSIUM SERPL-SCNC: 3.6 MMOL/L (ref 3.5–5.1)
Q-T INTERVAL, ECG07: 486 MS
QRS DURATION, ECG06: 88 MS
QTC CALCULATION (BEZET), ECG08: 513 MS
RBC # BLD AUTO: 2.96 M/UL (ref 3.8–5.2)
SERVICE CMNT-IMP: ABNORMAL
SODIUM SERPL-SCNC: 141 MMOL/L (ref 136–145)
VENTRICULAR RATE, ECG03: 67 BPM
WBC # BLD AUTO: 6.2 K/UL (ref 3.6–11)

## 2019-03-16 PROCEDURE — 80048 BASIC METABOLIC PNL TOTAL CA: CPT

## 2019-03-16 PROCEDURE — 82962 GLUCOSE BLOOD TEST: CPT

## 2019-03-16 PROCEDURE — 74011250637 HC RX REV CODE- 250/637: Performed by: INTERNAL MEDICINE

## 2019-03-16 PROCEDURE — 36415 COLL VENOUS BLD VENIPUNCTURE: CPT

## 2019-03-16 PROCEDURE — 85025 COMPLETE CBC W/AUTO DIFF WBC: CPT

## 2019-03-16 RX ADMIN — ASPIRIN 81 MG 81 MG: 81 TABLET ORAL at 09:36

## 2019-03-16 RX ADMIN — TICAGRELOR 90 MG: 90 TABLET ORAL at 09:36

## 2019-03-16 RX ADMIN — METOPROLOL TARTRATE 12.5 MG: 25 TABLET ORAL at 09:36

## 2019-03-16 RX ADMIN — PANTOPRAZOLE SODIUM 40 MG: 40 TABLET, DELAYED RELEASE ORAL at 09:36

## 2019-03-16 RX ADMIN — FLUTICASONE FUROATE AND VILANTEROL TRIFENATATE 1 PUFF: 100; 25 POWDER RESPIRATORY (INHALATION) at 09:35

## 2019-03-16 RX ADMIN — LOSARTAN POTASSIUM 50 MG: 50 TABLET ORAL at 09:36

## 2019-03-16 RX ADMIN — DULOXETINE HYDROCHLORIDE 30 MG: 30 CAPSULE, DELAYED RELEASE ORAL at 09:36

## 2019-03-16 NOTE — PROGRESS NOTES
DISCHARGE ORDERS ACKNOWLEDGED. Pt to discharge home by private vehicle with family. No PT/OT needs at this time, declined [facility] and [facility] services (no qualifying [facility] diagnosis - unstable angina). Pt to schedule PCP and new-patient Cardiology  f/u appointment, as offices are closed at this time. All information entered into pt AVS.    Pt has no additional CM needs at this time. Care Management Interventions  PCP Verified by CM: Yes  Palliative Care Criteria Met (RRAT>21 & CHF Dx)?: No  Mode of Transport at Discharge: Other (see comment)(family)  Transition of Care Consult (CM Consult): Discharge Planning  Discharge Durable Medical Equipment: No  Health Maintenance Reviewed: Yes  Physical Therapy Consult: No  Occupational Therapy Consult: No  Speech Therapy Consult: No  Current Support Network:  Other(lives with other adults)  Confirm Follow Up Transport: Family  Plan discussed with Pt/Family/Caregiver: No  Discharge Location  Discharge Placement: Home    Jeovany Isaac MSW Supervisee in Social Work, [address]  [phone]

## 2019-03-16 NOTE — DISCHARGE SUMMARY
Cardiology Discharge Summary     Patient ID: Kimberlee Campbell  [id number]  49 y.o.  1950    Admit Date: 3/14/2019  Discharge Date: 3/16/2019   Admitting Physician: Belia Villareal MD   Discharge Physician: Janene Cha III, DO    Admission Diagnoses: Unstable angina [facility]) [I20.0]    Discharge Diagnoses: Active Problems:    Unstable angina ([address] (3/14/2019)        Cardiology Procedures this Admission:  Left heart catheterization with PCI    Hospital Course:  Admitted w/ [place]; underwent single vessel PCI of LAD w/ 3 GIBSON. CP resolved post-PCI. She was continued on GDMT. She will see Dr. John Goff in 2 weeks. Visit Vitals  /71   Pulse 70   Temp 98.1 °F (36.7 °C)   Resp 17   Ht 5' 4\" (1.626 m)   Wt 60.3 kg (133 lb)   SpO2 99%   BMI 22.83 kg/m²     General Appearance: Well developed, well nourished, alert & oriented x 3,               no acute distress. Ears/Nose/Mouth/Throat: Hearing grossly normal.  Neck: Supple. Chest: Lungs clear to auscultation bilaterally. Cardiovascular: Regular rate and rhythm, S1S2 normal, no murmur. Abdomen: Soft, non-tender, bowel sounds are active. Extremities: No edema bilaterally. Skin: Warm and dry. Right groin tender but no hematoma, ecchymosis, bruit, edema. Labs, tele, imaging reviewed. Recent Labs     03/16/19  0441 03/14/19  1652    142   K 3.6 3.9   BUN 8 6   CREA 0.67 0.95   WBC 6.2 5.0   HGB 9.7* 13.1   HCT 28.4* 37.5    215     Lab Results   Component Value Date/Time    Cholesterol, total 190 10/29/2018 12:13 PM    HDL Cholesterol 62 10/29/2018 12:13 PM    LDL, calculated 75 10/29/2018 12:13 PM    Triglyceride 264 (H) 10/29/2018 12:13 PM     Recent Labs     03/14/19  1652   SGOT 19   ALT 22        No results for input(s): INR, PTP, APTT in the last 72 hours. No lab exists for component: INREXT     Patient was ambulating without symptoms prior to d/c.     Consults: None  Disposition: home  Discharge Condition: Alina Augustin Instructions:   Current Discharge Medication List      START taking these medications    Details   aspirin 81 mg chewable tablet Take 1 Tab by mouth daily. atorvastatin (LIPITOR) 40 mg tablet Take 1 Tab by mouth nightly. Qty: 30 Tab, Refills: 12      ticagrelor (BRILINTA) 90 mg tablet Take 1 Tab by mouth every twelve (12) hours every twelve (12) hours. Qty: 60 Tab, Refills: 12         CONTINUE these medications which have NOT CHANGED    Details   albuterol (PROAIR HFA) 90 mcg/actuation inhaler Take 2 Puffs by inhalation every four (4) hours as needed for Shortness of Breath. Usually uses at least once daily. Qty: 1 Inhaler, Refills: 11      oxyCODONE (OXYIR) 5 mg capsule Take 1 Cap by mouth every four (4) hours as needed. Max Daily Amount: 30 mg.  Qty: 20 Cap, Refills: 0    Associated Diagnoses: Rheumatoid arthritis, involving unspecified site, unspecified rheumatoid factor presence ([facility] Other chronic pain      albuterol-ipratropium (DUO-NEB) 2.5 mg-0.5 mg/3 ml nebu USE 1 VIAL VIA NEBULIZER EVERY 4 HOURS AS NEEDED  Qty: 1620 mL, Refills: 1    Comments: **Patient requests 90 days supply**  Associated Diagnoses: Asthma, unspecified asthma severity, unspecified whether complicated, unspecified whether persistent; Chronic obstructive pulmonary disease, unspecified COPD type ([facility]      budesonide-formoterol (SYMBICORT) 80-4.5 mcg/actuation HFAA Take 2 Puffs by inhalation two (2) times a day. Qty: 1 Inhaler, Refills: 11      loratadine (CLARITIN) 10 mg tablet TAKE 1 TABLET BY MOUTH EVERY DAY  Qty: 90 Tab, Refills: 3      NOVOLIN N NPH U-100 INSULIN 100 unit/mL injection INJECT 20 UNITS SUBCUTANEOUSLY TWICE A DAY  Qty: 40 mL, Refills: 2      cetirizine (ZYRTEC) 10 mg tablet Take 10 mg by mouth nightly. Cholecalciferol, Vitamin D3, 50,000 unit cap Take 1 Cap by mouth Every Thursday. omeprazole (PRILOSEC) 20 mg capsule Take 20 mg by mouth daily.       metFORMIN (GLUCOPHAGE) 1,000 mg tablet Take 1,000 mg by mouth two (2) times daily (with meals). Patient unsure of dose or frequency - Rx Query states filled 2/18 #60 for 30 day supply      metoprolol (LOPRESSOR) 25 mg tablet Take 25 mg by mouth daily. Refills: 6      Blood-Glucose Meter (ACCU-CHEK KRISTIE PLUS METER) misc by Does Not Apply route. Qty: 1 Each, Refills: 0    Comments: Diagnosis: E11.9      glucose blood VI test strips (ACCU-CHEK KRISTIE PLUS TEST STRP) strip by Does Not Apply route See Admin Instructions. Qty: 100 Strip, Refills: 3    Comments: Diagnosis E11.9      Lancing Device with Lancets (ACCU-CHEK SOFT DEV LANCETS) kit by Does Not Apply route. Qty: 1 Kit, Refills: 11    Comments: Diagnosis Diagnosis E11.9      ondansetron (ZOFRAN ODT) 4 mg disintegrating tablet Take 1 Tab by mouth every six (6) hours as needed for Nausea. Qty: 30 Tab, Refills: 1      chlorpheniramine-HYDROcodone (TUSSIONEX) 10-8 mg/5 mL suspension Take 5 mL by mouth every twelve (12) hours as needed for Cough. Max Daily Amount: 10 mL. Qty: 115 mL, Refills: 0    Associated Diagnoses: Cough      sulfacetamide (BLEPH-10) 10 % ophthalmic solution Administer 1 Drop to left eye every three (3) hours. Qty: 1 Bottle, Refills: 1    Associated Diagnoses: Conjunctivitis of left eye, unspecified conjunctivitis type      losartan (COZAAR) 50 mg tablet TAKE 1 TABLET BY MOUTH DAILY  Qty: 90 Tab, Refills: 11    Comments: **Patient requests 90 days supply**      DULoxetine (CYMBALTA) 30 mg capsule TAKE 1 CAPSULE BY MOUTH DAILY  Qty: 90 Cap, Refills: 11    Comments: **Patient requests 90 days supply**      mirtazapine (REMERON) 30 mg tablet TAKE 1 TABLET BY MOUTH EVERY DAY AT BEDTIME FOR APPETITE SLEEP AND ANXIETY  Qty: 90 Tab, Refills: 6    Comments: **Patient requests 90 days supply**  Associated Diagnoses: Depression, unspecified depression type; Poor appetite;  Anxiety      traZODone (DESYREL) 50 mg tablet TAKE 1 TABLET BY MOUTH EVERY NIGHT AT BEDTIME  Qty: 90 Tab, Refills: 11    Comments: **Patient requests 90 days supply**      BD INSULIN SYRINGE ULTRA-FINE 1 mL 31 gauge x 5/16 syrg USE AS DIRECTED TWICE A DAY  Qty: 200 Syringe, Refills: 0             Referenced discharge instructions provided by nursing for diet and activity.   Follow-up:   Follow-up Information     Follow up With Specialties Details Why Roopa Eng MD Cardiology Schedule an appointment as soon as possible for a visit in 2 weeks  [address]  [facility] [place], Yasmin De Los Santos MD Internal Medicine   . [address]  [facility] [address] [place] [address]  [phone]              > 30 minutes coordinating discharge  Yes     Signed:  Liana Pantoja III, DO  3/16/2019  8:33 AM

## 2019-03-16 NOTE — PROGRESS NOTES
Went over use and side effects of Lipitor and Brilinta. Checked affordability of Brilinta, pharmacy stated with her insurance it will cost less the $4 . Expressed the importance of not missing doses, pt fully understood.

## 2019-03-16 NOTE — PROGRESS NOTES
Discharge instructions reviewed with patient and sister. Allowed adequate time to ask questions, all questions answered. Printed copy of AVS given to patient. All belongings gathered, IV and tele discontinued. Transported via wheelchair to main entrance and into care of family.

## 2019-03-17 NOTE — PROGRESS NOTES
Opened chart to locate informationReceived call from \" Son \" inquiring where is mother is . Informed him pt dc with sister . Call transferred to Nursing Supervisor.

## 2019-03-18 ENCOUNTER — OFFICE VISIT (OUTPATIENT)
Dept: INTERNAL MEDICINE CLINIC | Age: 69
End: 2019-03-18

## 2019-03-18 ENCOUNTER — PATIENT OUTREACH (OUTPATIENT)
Dept: INTERNAL MEDICINE CLINIC | Age: 69
End: 2019-03-18

## 2019-03-18 ENCOUNTER — HOSPITAL ENCOUNTER (EMERGENCY)
Age: 69
Discharge: HOME | End: 2019-03-18
Attending: EMERGENCY MEDICINE
Payer: MEDICAID

## 2019-03-18 VITALS
OXYGEN SATURATION: 100 % | RESPIRATION RATE: 18 BRPM | HEIGHT: 64 IN | TEMPERATURE: 99.2 F | SYSTOLIC BLOOD PRESSURE: 115 MMHG | DIASTOLIC BLOOD PRESSURE: 72 MMHG | BODY MASS INDEX: 23.66 KG/M2 | WEIGHT: 138.6 LBS | HEART RATE: 111 BPM

## 2019-03-18 VITALS
WEIGHT: 137.79 LBS | OXYGEN SATURATION: 100 % | SYSTOLIC BLOOD PRESSURE: 128 MMHG | DIASTOLIC BLOOD PRESSURE: 72 MMHG | TEMPERATURE: 98.2 F | BODY MASS INDEX: 23.52 KG/M2 | HEART RATE: 103 BPM | RESPIRATION RATE: 16 BRPM | HEIGHT: 64 IN

## 2019-03-18 DIAGNOSIS — I10 ESSENTIAL HYPERTENSION: ICD-10-CM

## 2019-03-18 DIAGNOSIS — I20.0 UNSTABLE ANGINA (HCC): Primary | ICD-10-CM

## 2019-03-18 DIAGNOSIS — E11.9 TYPE 2 DIABETES MELLITUS WITHOUT COMPLICATION, WITH LONG-TERM CURRENT USE OF INSULIN (HCC): ICD-10-CM

## 2019-03-18 DIAGNOSIS — R05.9 COUGH: ICD-10-CM

## 2019-03-18 DIAGNOSIS — M06.9 RHEUMATOID ARTHRITIS, INVOLVING UNSPECIFIED SITE, UNSPECIFIED RHEUMATOID FACTOR PRESENCE: ICD-10-CM

## 2019-03-18 DIAGNOSIS — Z79.4 TYPE 2 DIABETES MELLITUS WITHOUT COMPLICATION, WITH LONG-TERM CURRENT USE OF INSULIN (HCC): ICD-10-CM

## 2019-03-18 DIAGNOSIS — G57.93 NEUROPATHIC PAIN OF BOTH FEET: Primary | ICD-10-CM

## 2019-03-18 LAB
ANION GAP SERPL CALC-SCNC: 9 MMOL/L (ref 5–15)
BASOPHILS # BLD: 0 K/UL (ref 0–0.1)
BASOPHILS NFR BLD: 1 % (ref 0–1)
BUN SERPL-MCNC: 7 MG/DL (ref 6–20)
BUN/CREAT SERPL: 8 (ref 12–20)
CALCIUM SERPL-MCNC: 8.4 MG/DL (ref 8.5–10.1)
CHLORIDE SERPL-SCNC: 106 MMOL/L (ref 97–108)
CO2 SERPL-SCNC: 23 MMOL/L (ref 21–32)
CREAT SERPL-MCNC: 0.84 MG/DL (ref 0.55–1.02)
DIFFERENTIAL METHOD BLD: ABNORMAL
EOSINOPHIL # BLD: 0.1 K/UL (ref 0–0.4)
EOSINOPHIL NFR BLD: 2 % (ref 0–7)
ERYTHROCYTE [DISTWIDTH] IN BLOOD BY AUTOMATED COUNT: 12.4 % (ref 11.5–14.5)
GLUCOSE SERPL-MCNC: 169 MG/DL (ref 65–100)
HBA1C MFR BLD HPLC: 6.4 % (ref 4.8–5.6)
HCT VFR BLD AUTO: 28.4 % (ref 35–47)
HGB BLD-MCNC: 9.7 G/DL (ref 11.5–16)
IMM GRANULOCYTES # BLD AUTO: 0 K/UL (ref 0–0.04)
IMM GRANULOCYTES NFR BLD AUTO: 1 % (ref 0–0.5)
LYMPHOCYTES # BLD: 1.6 K/UL (ref 0.8–3.5)
LYMPHOCYTES NFR BLD: 21 % (ref 12–49)
MCH RBC QN AUTO: 33.1 PG (ref 26–34)
MCHC RBC AUTO-ENTMCNC: 34.2 G/DL (ref 30–36.5)
MCV RBC AUTO: 96.9 FL (ref 80–99)
MONOCYTES # BLD: 0.8 K/UL (ref 0–1)
MONOCYTES NFR BLD: 10 % (ref 5–13)
NEUTS SEG # BLD: 5 K/UL (ref 1.8–8)
NEUTS SEG NFR BLD: 65 % (ref 32–75)
NRBC # BLD: 0 K/UL (ref 0–0.01)
NRBC BLD-RTO: 0 PER 100 WBC
PLATELET # BLD AUTO: 186 K/UL (ref 150–400)
PMV BLD AUTO: 10.7 FL (ref 8.9–12.9)
POTASSIUM SERPL-SCNC: 3.1 MMOL/L (ref 3.5–5.1)
RBC # BLD AUTO: 2.93 M/UL (ref 3.8–5.2)
SODIUM SERPL-SCNC: 138 MMOL/L (ref 136–145)
WBC # BLD AUTO: 7.6 K/UL (ref 3.6–11)

## 2019-03-18 PROCEDURE — 85025 COMPLETE CBC W/AUTO DIFF WBC: CPT

## 2019-03-18 PROCEDURE — 36415 COLL VENOUS BLD VENIPUNCTURE: CPT

## 2019-03-18 PROCEDURE — 99281 EMR DPT VST MAYX REQ PHY/QHP: CPT

## 2019-03-18 PROCEDURE — 80048 BASIC METABOLIC PNL TOTAL CA: CPT

## 2019-03-18 RX ORDER — METOPROLOL TARTRATE 25 MG/1
25 TABLET, FILM COATED ORAL DAILY
Qty: 180 TAB | Refills: 3 | Status: SHIPPED | OUTPATIENT
Start: 2019-03-18 | End: 2019-03-19 | Stop reason: SDUPTHER

## 2019-03-18 RX ORDER — GABAPENTIN 300 MG/1
300 CAPSULE ORAL 3 TIMES DAILY
Qty: 30 CAP | Refills: 0 | Status: SHIPPED | OUTPATIENT
Start: 2019-03-18

## 2019-03-18 RX ORDER — HYDROCODONE POLISTIREX AND CHLORPHENIRAMINE POLISTIREX 10; 8 MG/5ML; MG/5ML
5 SUSPENSION, EXTENDED RELEASE ORAL
Qty: 115 ML | Refills: 0 | Status: CANCELLED | OUTPATIENT
Start: 2019-03-18

## 2019-03-18 RX ORDER — DULOXETIN HYDROCHLORIDE 30 MG/1
30 CAPSULE, DELAYED RELEASE ORAL DAILY
Qty: 90 CAP | Refills: 3 | Status: SHIPPED | OUTPATIENT
Start: 2019-03-18 | End: 2020-04-03

## 2019-03-18 RX ORDER — TRAZODONE HYDROCHLORIDE 50 MG/1
TABLET ORAL
Qty: 90 TAB | Refills: 3 | Status: SHIPPED | OUTPATIENT
Start: 2019-03-18 | End: 2020-04-03

## 2019-03-18 RX ORDER — GUAIFENESIN 100 MG/5ML
81 LIQUID (ML) ORAL DAILY
Qty: 30 TAB | Refills: 6 | Status: SHIPPED | OUTPATIENT
Start: 2019-03-18 | End: 2020-01-06

## 2019-03-18 RX ORDER — BENZONATATE 100 MG/1
100 CAPSULE ORAL
Qty: 30 CAP | Refills: 1 | Status: SHIPPED | OUTPATIENT
Start: 2019-03-18 | End: 2019-03-25

## 2019-03-18 RX ORDER — ONDANSETRON 4 MG/1
4 TABLET, ORALLY DISINTEGRATING ORAL
Qty: 30 TAB | Refills: 1 | Status: SHIPPED | OUTPATIENT
Start: 2019-03-18 | End: 2020-04-03

## 2019-03-18 RX ORDER — POTASSIUM CHLORIDE 750 MG/1
20 TABLET, FILM COATED, EXTENDED RELEASE ORAL 2 TIMES DAILY
Qty: 30 TAB | Refills: 0 | OUTPATIENT
Start: 2019-03-18 | End: 2020-07-30

## 2019-03-18 NOTE — DISCHARGE INSTRUCTIONS
Patient Education        Neuropathic Pain: Care Instructions  Your Care Instructions    Neuropathic pain is caused by pressure on or damage to your nerves. It's often simply called nerve pain. Some people feel this type of pain all the time. For others, it comes and goes. Diabetes, shingles, or an injury can cause nerve pain. Many people say the pain feels sharp, burning, or stabbing. But some people feel it as a dull ache. In some cases, it makes your skin very sensitive. So touch, pressure, and other sensations that did not hurt before may now cause pain. It's important to know that this kind of pain is real and can affect your quality of life. It's also important to know that treatment can help. Treatment includes pain medicines, exercise, and physical therapy. Medicines can help reduce the number of pain signals that travel over the nerves. This can make the painful areas less sensitive. It can also help you sleep better and improve your mood. But medicines are only one part of successful treatment. Most people do best with more than one kind of treatment. Your doctor may recommend that you try cognitive-behavioral therapy and stress management. Or, if needed, you may decide to try to quit smoking, lower your blood pressure, or better control blood sugar. These kinds of healthy changes can also make a difference. If you feel that your treatment is not working, talk to your doctor. And be sure to tell your doctor if you think you might be depressed or anxious. These are common problems that can also be treated. Follow-up care is a key part of your treatment and safety. Be sure to make and go to all appointments, and call your doctor if you are having problems. It's also a good idea to know your test results and keep a list of the medicines you take. How can you care for yourself at home? · Be safe with medicines. Read and follow all instructions on the label.   ? If the doctor gave you a prescription medicine for pain, take it as prescribed. ? If you are not taking a prescription pain medicine, ask your doctor if you can take an over-the-counter medicine. · Save hard tasks for days when you have less pain. Follow a hard task with an easy task. And remember to take breaks. · Relax, and reduce stress. You may want to try deep breathing or meditation. These can help. · Keep moving. Gentle, daily exercise can help reduce pain. Your doctor or physical therapist can tell you what type of exercise is best for you. This may include walking, swimming, and stationary biking. It may also include stretches and range-of-motion exercises. · Try heat, cold packs, and massage. · Get enough sleep. Constant pain can make you more tired. If the pain makes it hard to sleep, talk with your doctor. · Think positively. Your thoughts can affect your pain. Do fun things to distract yourself from the pain. See a movie, read a book, listen to music, or spend time with a friend. · Keep a pain diary. Try to write down how strong your pain is and what it feels like. Also try to notice and write down how your moods, thoughts, sleep, activities, and medicine affect your pain. These notes can help you and your doctor find the best ways to treat your pain. Reducing constipation caused by pain medicine  Pain medicines often cause constipation. To reduce constipation:  · Include fruits, vegetables, beans, and whole grains in your diet each day. These foods are high in fiber. · Drink plenty of fluids, enough so that your urine is light yellow or clear like water. If you have kidney, heart, or liver disease and have to limit fluids, talk with your doctor before you increase the amount of fluids you drink. · Get some exercise every day. Build up slowly to 30 to 60 minutes a day on 5 or more days of the week. · Take a fiber supplement, such as Citrucel or Metamucil, every day if needed.  Read and follow all instructions on the label.  · Schedule time each day for a bowel movement. Having a daily routine may help. Take your time and do not strain when having a bowel movement. · Ask your doctor about a laxative. The goal is to have one easy bowel movement every 1 to 2 days. Do not let constipation go untreated for more than 3 days. When should you call for help? Call your doctor now or seek immediate medical care if:    · You feel sad, anxious, or hopeless for more than a few days. This could mean you are depressed. Depression is common in people who have a lot of pain. But it can be treated.     · You have trouble with bowel movements, such as:  ? No bowel movement in 3 days. ? Blood in the anal area, in your stool, or on the toilet paper. ? Diarrhea for more than 24 hours.    Watch closely for changes in your health, and be sure to contact your doctor if:    · Your pain is getting worse.     · You can't sleep because of pain.     · You are very worried or anxious about your pain.     · You have trouble taking your pain medicine.     · You have any concerns about your pain medicine or its side effects.     · You have vomiting or cramps for more than 2 hours. Where can you learn more? Go to http://shakir-ros.info/. Enter N685 in the search box to learn more about \"Neuropathic Pain: Care Instructions. \"  Current as of: Heather 3, 2018  Content Version: 11.9  © 6774-8962 [employer]. Care instructions adapted under license by [employer] (which disclaims liability or warranty for this information). If you have questions about a medical condition or this instruction, always ask your healthcare professional. [address] any warranty or liability for your use of this information.

## 2019-03-18 NOTE — Clinical Note
Patient is interested in Glucerna ronaldkes and would like to see if you could send Rx for her insurance to potentially cover?

## 2019-03-18 NOTE — PROGRESS NOTES
Hospital Discharge Follow-Up      Date/Time:  3/18/19 10:55 AM    Patient was admitted to [facility] on 3/14/19 and discharged on 3/16/19 for Unstable Angina. The physician discharge summary was available at the time of outreach. Patient was contacted within 1 business days of discharge. Top Challenges reviewed with the provider   Unstable angina s/p left heart cath  Was seen in ED yesterday for numbness - started on gabapentin  Would like to start Glucerna shake for extra nutrition- pt concerned about weight loss  Active smoker, No ACP on file, . Method of communication with provider :chart routing    Inpatient RRAT score: 25  Was this a readmission? no   Patient stated reason for the readmission: n/a    Nurse Navigator (NN) contacted the patient by telephone to perform post hospital discharge assessment. Verified name and  with patient as identifiers. Provided introduction to self, and explanation of the Nurse Navigator role. Reviewed discharge instructions and red flags with patient who verbalized understanding. Patient given an opportunity to ask questions and does not have any further questions or concerns at this time. The patient agrees to contact the PCP office for questions related to their healthcare. NN provided contact information for future reference. Disease Specific:   N/A    Summary of patient's top problems:  1. Cardiac: presented to ED with chest pain x3 days that radiated to back, s/s last serveral minutes then would go away on their own. ECG in the emergency room showed sinus tachycardia at a rate of 106 beats per minute. Troponin Negative. Active smoker. CXR: PA and lateral views of the chest are obtained. The cardiopericardial silhouette is within normal limits. There is no pleural effusion, pneumothorax or focal consolidation present. Patient underwent single vessel PCI of LAD w/ 3 GIBSON. Chest pain resolved post PCI. 2. Diabetes: type 2. Requiring chronic insulin. 3. Social: No ACP on file, . Lives with other adults. Active smoker. HgBA1C 6.4. Patient concerned about previous weight loss. Discharge CBC & BMP  Component      Latest Ref Rng & Units 3/16/2019 3/16/2019           4:41 AM  4:41 AM   WBC      3.6 - 11.0 K/uL 6.2    RBC      3.80 - 5.20 M/uL 2.96 (L)    HGB      11.5 - 16.0 g/dL 9.7 (L)    HCT      35.0 - 47.0 % 28.4 (L)    MCV      80.0 - 99.0 FL 95.9    MCH      26.0 - 34.0 PG 32.8    MCHC      30.0 - 36.5 g/dL 34.2    RDW      11.5 - 14.5 % 11.9    PLATELET      589 - 016 K/uL 150    MPV      8.9 - 12.9 FL 11.2    NRBC      0  WBC 0.0    ABSOLUTE NRBC      0.00 - 0.01 K/uL 0.00    NEUTROPHILS      32 - 75 % 70    LYMPHOCYTES      12 - 49 % 19    MONOCYTES      5 - 13 % 8    EOSINOPHILS      0 - 7 % 1    BASOPHILS      0 - 1 % 1    IMMATURE GRANULOCYTES      0.0 - 0.5 % 0    ABS. NEUTROPHILS      1.8 - 8.0 K/UL 4.4    ABS. LYMPHOCYTES      0.8 - 3.5 K/UL 1.2    ABS. MONOCYTES      0.0 - 1.0 K/UL 0.5    ABS. EOSINOPHILS      0.0 - 0.4 K/UL 0.1    ABS. BASOPHILS      0.0 - 0.1 K/UL 0.0    ABS. IMM.  GRANS.      0.00 - 0.04 K/UL 0.0    DF       AUTOMATED    Sodium      136 - 145 mmol/L  141   Potassium      3.5 - 5.1 mmol/L  3.6   Chloride      97 - 108 mmol/L  111 (H)   CO2      21 - 32 mmol/L  19 (L)   Anion gap      5 - 15 mmol/L  11   Glucose      65 - 100 mg/dL  146 (H)   BUN      6 - 20 MG/DL  8   Creatinine      0.55 - 1.02 MG/DL  0.67   BUN/Creatinine ratio      12 - 20    12   GFR est AA      >60 ml/min/1.73m2  >60   GFR est non-AA      >60 ml/min/1.73m2  >60   Calcium      8.5 - 10.1 MG/DL  7.7 (L)       Home Health orders at discharge: patient refused  [address]: n/a  Date of initial visit: n/a    Durable Medical Equipment ordered/company: none  Durable Medical Equipment received: n/a    Barriers to care? lack of knowledge about disease    Advance Care Planning:   Does patient have an Advance Directive:  not on file; education provided     Medication(s):   New Medications at Discharge:   aspirin 81 mg chewable tablet Take 1 Tab by mouth daily.       atorvastatin (LIPITOR) 40 mg tablet Take 1 Tab by mouth nightly. Qty: 30 Tab, Refills: 12       ticagrelor (BRILINTA) 90 mg tablet Take 1 Tab by mouth every twelve (12) hours every twelve (12) hours. Changed Medications at Discharge: n/a  Discontinued Medications at Discharge: n/a    Medication reconciliation was performed with patient, who verbalizes understanding of administration of home medications. There were no barriers to obtaining medications identified at this time. Referral to Pharm D needed: no     Current Outpatient Medications   Medication Sig    metoprolol tartrate (LOPRESSOR) 25 mg tablet Take 1 Tab by mouth daily.  aspirin 81 mg chewable tablet Take 1 Tab by mouth daily.  ondansetron (ZOFRAN ODT) 4 mg disintegrating tablet Take 1 Tab by mouth every six (6) hours as needed for Nausea.  DULoxetine (CYMBALTA) 30 mg capsule Take 1 Cap by mouth daily.  traZODone (DESYREL) 50 mg tablet TAKE 1 TABLET BY MOUTH EVERY NIGHT AT BEDTIME    benzonatate (TESSALON) 100 mg capsule Take 1 Cap by mouth three (3) times daily as needed for Cough for up to 7 days.  gabapentin (NEURONTIN) 300 mg capsule Take 1 Cap by mouth three (3) times daily.  potassium chloride SR (KLOR-CON 10) 10 mEq tablet Take 2 Tabs by mouth two (2) times a day.  atorvastatin (LIPITOR) 40 mg tablet Take 1 Tab by mouth nightly.  ticagrelor (BRILINTA) 90 mg tablet Take 1 Tab by mouth every twelve (12) hours every twelve (12) hours.  Blood-Glucose Meter (ACCU-CHEK KRISTIE PLUS METER) misc by Does Not Apply route.  glucose blood VI test strips (ACCU-CHEK KRISTIE PLUS TEST STRP) strip by Does Not Apply route See Admin Instructions.  Lancing Device with Lancets (ACCU-CHEK SOFT DEV LANCETS) kit by Does Not Apply route.     chlorpheniramine-HYDROcodone (TUSSIONEX) 10-8 mg/5 mL suspension Take 5 mL by mouth every twelve (12) hours as needed for Cough. Max Daily Amount: 10 mL.  albuterol (PROAIR HFA) 90 mcg/actuation inhaler Take 2 Puffs by inhalation every four (4) hours as needed for Shortness of Breath. Usually uses at least once daily.  oxyCODONE (OXYIR) 5 mg capsule Take 1 Cap by mouth every four (4) hours as needed. Max Daily Amount: 30 mg.    albuterol-ipratropium (DUO-NEB) 2.5 mg-0.5 mg/3 ml nebu USE 1 VIAL VIA NEBULIZER EVERY 4 HOURS AS NEEDED    budesonide-formoterol (SYMBICORT) 80-4.5 mcg/actuation HFAA Take 2 Puffs by inhalation two (2) times a day.  sulfacetamide (BLEPH-10) 10 % ophthalmic solution Administer 1 Drop to left eye every three (3) hours.  losartan (COZAAR) 50 mg tablet TAKE 1 TABLET BY MOUTH DAILY    mirtazapine (REMERON) 30 mg tablet TAKE 1 TABLET BY MOUTH EVERY DAY AT BEDTIME FOR APPETITE SLEEP AND ANXIETY    loratadine (CLARITIN) 10 mg tablet TAKE 1 TABLET BY MOUTH EVERY DAY    BD INSULIN SYRINGE ULTRA-FINE 1 mL 31 gauge x 5/16 syrg USE AS DIRECTED TWICE A DAY    NOVOLIN N NPH U-100 INSULIN 100 unit/mL injection INJECT 20 UNITS SUBCUTANEOUSLY TWICE A DAY    cetirizine (ZYRTEC) 10 mg tablet Take 10 mg by mouth nightly.  Cholecalciferol, Vitamin D3, 50,000 unit cap Take 1 Cap by mouth Every Thursday.  omeprazole (PRILOSEC) 20 mg capsule Take 20 mg by mouth daily.  metFORMIN (GLUCOPHAGE) 1,000 mg tablet Take 1,000 mg by mouth two (2) times daily (with meals). Patient unsure of dose or frequency - Rx Query states filled 2/18 #60 for 30 day supply     No current facility-administered medications for this visit. There are no discontinued medications.     [facility] follow up appointment(s):   Future Appointments   Date Time Provider Nancy Chapa   5/1/2019  9:00 AM Rob Caremn MD [facility]      Non-[facility] follow up appointment(s): Dr. Yeny Aguillon:  information provided as a resource Goals      Prepare patients and caregivers for end of life decisions (ie. need for hospice, pain management, symptom relief, advance directives etc.)      03/19/19    ACP: Spoke to patient today. Patient does not have ACP on file, patient accepted education and is interested in ACP. ACP information packet mailed to patients home address. Patient/family to review and make list of any questions to review w/ NN. NN to f/u 1 week. AR        Smoking cessation. 03/19/19    Educated patient on smoking cessation. Patient states that she has not smoked since before she went to the hospital and would like to quit. Encouraged patient on choosing new hobby to replace smoking. Patient states that she hasn't had the desire to smoke. Advised patient to contact NN if she gets urge to smoke before next call. NN to f/u 1 week. AR       Supportive resources in place to maintain patient in the community (ie. Home Health, DME equipment, refer to, medication assistant plan, etc.)      03/19/19    Patient attended f/u with Dr. Marli Lindsey yesterday. Was told to contact cardiology regarding numbness and was advised to go to ER. Patient was dx with neuropathy and started on Gabapentin and K+ for K level of 3.1 Patient reported that she had those medications from the pharmacy. Discussed cardiac and diabetic diet, provided education and mailed information. Glucose levels reported today as 121 - patient reports that she checks it BID    - Make rheumatology appt  - Make psych appt   - Dr. Samuel Castle f/u - scheduled in 2 weeks per patient. - Monitor PO intake, start glucerna shake for extra nutrition  - Review diabetic and cardiac dietary information mailed to patient   - BID glucose checks and keep log     Patient verbalized understanding of the above plan. NN to f/u 1 week. AR       Understands red flags post discharge. 03/19/19    Spoke to patient today.  Reviewed red flag s/s with patient, nausea, vomiting, inability to pass urine/stool, SOB, mental status change, chest pain, fever. Patient verbalized understanding and will contact NN/MD if red flag s/s arise. NN to f/u 1 week.  AR

## 2019-03-18 NOTE — LETTER
[facility] At any age, we need to think about our wishes if something catastrophic were to happen medically. If you were not able to speak for yourself, is there a way that your wishes were followed if you cannot meaningfully recover? There is a way you can let your medical team know if you do not want (or want) certain medical procedures in the event that you cannot recover from something back to good quality of life. Please find enclosed the documents and papers regarding making an Advance Care Plan. You just need to pick your 2 \"spokespeople\" and let them know what you want. Then you decide what you want by checking the boxes. Lastly, you sign the last page with two people watching you and they sign the witness blanks. These papers do NOT need to be notarized, are effective from the date signed and can be changed or revoked at any time. When you do complete the paperwork, please bring them to your Primary Care office. A copy will be made for their records and the original returned to you for safekeeping. The papers will then be available for all Carol Strauss. If you have any questions please feel free to call me at [phone]. Sincerely, Carlos Mireles, MSN, RN, CRRN, CNL

## 2019-03-18 NOTE — PROGRESS NOTES
1. Have you been to the ER, urgent care clinic since your last visit? Hospitalized since your last visit? [facility]     2. Have you seen or consulted any other health care providers outside of the [facility] since your last visit? Include any pap smears or colon screening.  no

## 2019-03-18 NOTE — PROGRESS NOTES
SUBJECTIVE  Ms. Jane Dunlap presents today acutely for the following issues. She is due also for follow up of routine medical issues. She was a longterm patient of Dr. Mara Morales, who retired. Her daughter and sister are with her. Chief Complaint   Patient presents with    Diabetes     pt here today for 4 month f.u and pt had 4 stents placed at [facility] (angioplastic); pt states that on this morning she had nose bleeds    Numbness     pt c.o numbess in both feet and that her feet feels cold     She was just in the hospital:     From H and P:   This is a 72-year-old female with a history of intermittent chest discomfort that has been occurring over the last few weeks. She says that she has been under a lot of stress lately. The symptoms have escalated to the point where she has sought medical care in the emergency room. The character of the discomfort is described as pulling and the sensation actually radiates straight to her back and migrates. The last 3 days has been more notable. The symptoms typically lasts several minutes and then resolve on their own. Associated symptoms include nausea. She has had a prior cardiac evaluation at least 10 years ago. She cannot remember the name of her cardiologist.  It appears this may have been done in the [facility] system.     She denies syncope, dizziness, palpitations. No heart failure symptoms. Some shortness of breath that is typical for her. No TIA or stroke-like symptoms. Hospital course:     Admitted w/ [place]; underwent single vessel PCI of LAD w/ 3 GIBSON. CP resolved post-PCI. She was continued on GDMT. She will see Dr. Sue Garner in 2 weeks. She has no chest pain. However, she says that she has some sensation of numbness and cold feeling in her feet since the cath. She is still in pain management with Dr. Faina Lechuga. \"I have COPD. \"  She hasn't yet been to see Pulmonology. Has a chronic productive cough. Weight loss: Now on remeron for this. Wt Readings from Last 3 Encounters:   03/18/19 138 lb 9.6 oz (62.9 kg)   03/14/19 133 lb (60.3 kg)   10/29/18 130 lb 12.8 oz (59.3 kg)       DM: On insulin. \"I think that's doing fine. \"     It appears she was admitted over a year ago for LLQ abdom pain and BRBPR; CT showed diverticulosis with no diverticulitis. Anxiety: We noted last month:  \"I be depressed. I don't feel well. I have to make myself do things. \"  She is anxious \"all the time. \"  Trouble sleeping at night. She used to be on benzodiazepines, when she was seeing a psychiatrist \"at the [facility] place\"; office closed down. Dr. Flores Figures showed up for an appointment and they was gone. \"  Now not seeing a psychiatrist.    At this time, she is otherwise doing well and has brought no other complaints to my attention today. For a list of the medical issues addressed today, see the assessment and plan below. PMH:   Past Medical History:   Diagnosis Date    Anxiety     Asthma     COPD     Diabetes [facility]     Dyspepsia and other specified disorders of function of stomach     Hemorrhoids     Hypertension     Rheumatoid arthritis ([facility] 5/8/2018       Past Surgical History:   Procedure Laterality Date    HX BREAST BIOPSY Left     benign    HX GYN      Thermal Ablation    HX HEART CATHETERIZATION      exploration    HX LAPAROTOMY      HX ORTHOPAEDIC      right knee    HX OTHER SURGICAL      Colonoscopy    HX TUBAL LIGATION         All: is allergic to buspirone and tylenol-codeine #3 [acetaminophen-codeine]. Current Outpatient Medications   Medication Sig    aspirin 81 mg chewable tablet Take 1 Tab by mouth daily.  atorvastatin (LIPITOR) 40 mg tablet Take 1 Tab by mouth nightly.  ticagrelor (BRILINTA) 90 mg tablet Take 1 Tab by mouth every twelve (12) hours every twelve (12) hours.  Blood-Glucose Meter (ACCU-CHEK KRISTIE PLUS METER) mis by Does Not Apply route.     glucose blood VI test strips (ACCU-CHEK KRISTIE PLUS TEST STRP) strip by Does Not Apply route See Admin Instructions.  Lancing Device with Lancets (ACCU-CHEK SOFT DEV LANCETS) kit by Does Not Apply route.  ondansetron (ZOFRAN ODT) 4 mg disintegrating tablet Take 1 Tab by mouth every six (6) hours as needed for Nausea.  chlorpheniramine-HYDROcodone (TUSSIONEX) 10-8 mg/5 mL suspension Take 5 mL by mouth every twelve (12) hours as needed for Cough. Max Daily Amount: 10 mL.  albuterol (PROAIR HFA) 90 mcg/actuation inhaler Take 2 Puffs by inhalation every four (4) hours as needed for Shortness of Breath. Usually uses at least once daily.  oxyCODONE (OXYIR) 5 mg capsule Take 1 Cap by mouth every four (4) hours as needed. Max Daily Amount: 30 mg.    albuterol-ipratropium (DUO-NEB) 2.5 mg-0.5 mg/3 ml nebu USE 1 VIAL VIA NEBULIZER EVERY 4 HOURS AS NEEDED    budesonide-formoterol (SYMBICORT) 80-4.5 mcg/actuation HFAA Take 2 Puffs by inhalation two (2) times a day.  sulfacetamide (BLEPH-10) 10 % ophthalmic solution Administer 1 Drop to left eye every three (3) hours.  losartan (COZAAR) 50 mg tablet TAKE 1 TABLET BY MOUTH DAILY    DULoxetine (CYMBALTA) 30 mg capsule TAKE 1 CAPSULE BY MOUTH DAILY    mirtazapine (REMERON) 30 mg tablet TAKE 1 TABLET BY MOUTH EVERY DAY AT BEDTIME FOR APPETITE SLEEP AND ANXIETY    traZODone (DESYREL) 50 mg tablet TAKE 1 TABLET BY MOUTH EVERY NIGHT AT BEDTIME    loratadine (CLARITIN) 10 mg tablet TAKE 1 TABLET BY MOUTH EVERY DAY    BD INSULIN SYRINGE ULTRA-FINE 1 mL 31 gauge x 5/16 syrg USE AS DIRECTED TWICE A DAY    NOVOLIN N NPH U-100 INSULIN 100 unit/mL injection INJECT 20 UNITS SUBCUTANEOUSLY TWICE A DAY    cetirizine (ZYRTEC) 10 mg tablet Take 10 mg by mouth nightly.  Cholecalciferol, Vitamin D3, 50,000 unit cap Take 1 Cap by mouth Every Thursday.  omeprazole (PRILOSEC) 20 mg capsule Take 20 mg by mouth daily.  metFORMIN (GLUCOPHAGE) 1,000 mg tablet Take 1,000 mg by mouth two (2) times daily (with meals).  Patient unsure of dose or frequency - Rx Query states filled 2/18 #60 for 30 day supply    metoprolol (LOPRESSOR) 25 mg tablet Take 25 mg by mouth daily. No current facility-administered medications for this visit. FH: family history includes Cancer in her sister; Heart Disease in her father. SH:  reports that she has been smoking. She has a 9.50 pack-year smoking history. she has never used smokeless tobacco. She reports that she drinks about 1.0 oz of alcohol per week. She reports that she does not use drugs. ROS: See above; Complete ROS otherwise negative. OBJECTIVE:   Vitals:   Visit Vitals  /72 (BP 1 Location: Right arm, BP Patient Position: Sitting)   Pulse (!) 111   Temp 99.2 °F (37.3 °C) (Oral)   Resp 18   Ht 5' 4\" (1.626 m)   Wt 138 lb 9.6 oz (62.9 kg)   SpO2 100%   BMI 23.79 kg/m²      Gen: Pleasant 79 y.o.  female in NAD. HEENT: PERRLA. EOMI. OP moist and pink. Neck: Supple. No LAD. HEART: RRR, No M/G/R.    LUNGS: CTAB No W/R. ABDOMEN: S, NT, ND, BS+. EXTREMITIES: Warm. No C/C/E.  MUSCULOSKELETAL: Normal ROM, muscle strength 5/5 all groups. NEURO: Alert and oriented x 3. Cranial nerves grossly intact. No focal sensory or motor deficits noted. SKIN: Warm. Dry. No rashes or other lesions noted. Lab Results   Component Value Date/Time    Sodium 141 03/16/2019 04:41 AM    Potassium 3.6 03/16/2019 04:41 AM    Chloride 111 (H) 03/16/2019 04:41 AM    CO2 19 (L) 03/16/2019 04:41 AM    Anion gap 11 03/16/2019 04:41 AM    Glucose 146 (H) 03/16/2019 04:41 AM    BUN 8 03/16/2019 04:41 AM    Creatinine 0.67 03/16/2019 04:41 AM    BUN/Creatinine ratio 12 03/16/2019 04:41 AM    GFR est AA >60 03/16/2019 04:41 AM    GFR est non-AA >60 03/16/2019 04:41 AM    Calcium 7.7 (L) 03/16/2019 04:41 AM    Bilirubin, total 0.6 03/14/2019 04:52 PM    ALT (SGPT) 22 03/14/2019 04:52 PM    AST (SGOT) 19 03/14/2019 04:52 PM    Alk.  phosphatase 114 03/14/2019 04:52 PM    Protein, total 7.6 03/14/2019 04:52 PM Albumin 3.6 03/14/2019 04:52 PM    Globulin 4.0 03/14/2019 04:52 PM    A-G Ratio 0.9 (L) 03/14/2019 04:52 PM        Lab Results   Component Value Date/Time    Hemoglobin A1c 6.5 (H) 10/29/2018 12:13 PM       Lab Results   Component Value Date/Time    WBC 6.2 03/16/2019 04:41 AM    HGB 9.7 (L) 03/16/2019 04:41 AM    HCT 28.4 (L) 03/16/2019 04:41 AM    PLATELET 374 13/78/1466 04:41 AM    MCV 95.9 03/16/2019 04:41 AM       Recent Results (from the past 12 hour(s))   AMB POC HEMOGLOBIN A1C    Collection Time: 03/18/19  3:08 PM   Result Value Ref Range    Hemoglobin A1c (POC) 6.4 (A) 4.8 - 5.6 %   CBC WITH AUTOMATED DIFF    Collection Time: 03/18/19  4:28 PM   Result Value Ref Range    WBC 7.6 3.6 - 11.0 K/uL    RBC 2.93 (L) 3.80 - 5.20 M/uL    HGB 9.7 (L) 11.5 - 16.0 g/dL    HCT 28.4 (L) 35.0 - 47.0 %    MCV 96.9 80.0 - 99.0 FL    MCH 33.1 26.0 - 34.0 PG    MCHC 34.2 30.0 - 36.5 g/dL    RDW 12.4 11.5 - 14.5 %    PLATELET 386 073 - 122 K/uL    MPV 10.7 8.9 - 12.9 FL    NRBC 0.0 0  WBC    ABSOLUTE NRBC 0.00 0.00 - 0.01 K/uL    NEUTROPHILS 65 32 - 75 %    LYMPHOCYTES 21 12 - 49 %    MONOCYTES 10 5 - 13 %    EOSINOPHILS 2 0 - 7 %    BASOPHILS 1 0 - 1 %    IMMATURE GRANULOCYTES 1 (H) 0.0 - 0.5 %    ABS. NEUTROPHILS 5.0 1.8 - 8.0 K/UL    ABS. LYMPHOCYTES 1.6 0.8 - 3.5 K/UL    ABS. MONOCYTES 0.8 0.0 - 1.0 K/UL    ABS. EOSINOPHILS 0.1 0.0 - 0.4 K/UL    ABS. BASOPHILS 0.0 0.0 - 0.1 K/UL    ABS. IMM.  GRANS. 0.0 0.00 - 0.04 K/UL    DF AUTOMATED     METABOLIC PANEL, BASIC    Collection Time: 03/18/19  4:28 PM   Result Value Ref Range    Sodium 138 136 - 145 mmol/L    Potassium 3.1 (L) 3.5 - 5.1 mmol/L    Chloride 106 97 - 108 mmol/L    CO2 23 21 - 32 mmol/L    Anion gap 9 5 - 15 mmol/L    Glucose 169 (H) 65 - 100 mg/dL    BUN 7 6 - 20 MG/DL    Creatinine 0.84 0.55 - 1.02 MG/DL    BUN/Creatinine ratio 8 (L) 12 - 20      GFR est AA >60 >60 ml/min/1.73m2    GFR est non-AA >60 >60 ml/min/1.73m2    Calcium 8.4 (L) 8.5 - 10.1 MG/DL       ASSESSMENT/ PLAN: Diagnoses and all orders for this visit:    1. CAD: s/p PCI. F/U as per Cardiology. Given numbness R LE, suggested calling Dr. Shakira JARQUIN. 2. Hypertension, unspecified type: BP fine today. -     METABOLIC PANEL, COMPREHENSIVE  -     LIPID PANEL  -     CBC WITH AUTOMATED DIFF  3. Chronic obstructive pulmonary disease, unspecified COPD type ([facility]  -     PULMONARY FUNCTION TEST; Future  -     [facility]  4. Type 2 diabetes mellitus without complication, with long-term current use of insulin (HCC)  -     METABOLIC PANEL, COMPREHENSIVE  -     LIPID PANEL  -     HEMOGLOBIN A1C WITH EAG  -     MICROALBUMIN, UR, RAND  5. Chronic pain: She is now in pain clinic. She should continue this. 6. Anxiety and depression: Stable. Referred previously to Dr. Kevin Nevarez. 7. Rheumatoid arthritis, involving unspecified site, unspecified rheumatoid factor presence ([facility]):  REFERRAL TO RHEUMATOLOGY    Follow-up Disposition:  Return in about 4 months (around 7/18/2019) for DM. I have reviewed the patient's medications and risks/side effects/benefits were discussed. Diagnosis(-es) explained to patient and questions answered. Literature provided where appropriate.

## 2019-03-18 NOTE — LETTER
NOTIFICATION RETURN TO WORK / SCHOOL 
 
3/18/2019 2:39 PM 
 
Ms. Toñito Ewing [address] To Whom It May Concern: 
 
Toñito Ewing is currently under the care of [facility]. She was seen today for hospital follow up. If there are questions or concerns please have the patient contact our office.  
 
 
 
Sincerely, 
 
 
Giovanny Yap MD

## 2019-03-19 ENCOUNTER — TELEPHONE (OUTPATIENT)
Dept: CARDIAC REHAB | Age: 69
End: 2019-03-19

## 2019-03-19 LAB
ACT BLD: 252 SECS (ref 79–138)
ACT BLD: 290 SECS (ref 79–138)
ACT BLD: 296 SECS (ref 79–138)

## 2019-03-19 RX ORDER — METOPROLOL TARTRATE 25 MG/1
25 TABLET, FILM COATED ORAL 2 TIMES DAILY
Qty: 180 TAB | Refills: 3 | Status: SHIPPED | OUTPATIENT
Start: 2019-03-19

## 2019-03-19 NOTE — TELEPHONE ENCOUNTER
Spoke with pharmacy , they want to know if Metoprolol is supposed to be twice per day, because of the quantity?

## 2019-03-19 NOTE — PATIENT INSTRUCTIONS
Counting Carbohydrates: Care Instructions Your Care Instructions You don't have to eat special foods when you have diabetes. You just have to be careful to eat healthy foods. Carbohydrates (carbs) raise blood sugar higher and quicker than any other nutrient. Carbs are found in desserts, breads and cereals, and fruit. They're also in starchy vegetables. These include potatoes, corn, and grains such as rice and pasta. Carbs are also in milk and yogurt. The more carbs you eat at one time, the higher your blood sugar will rise. Spreading carbs all through the day helps keep your blood sugar levels within your target range. Counting carbs is one of the best ways to keep your blood sugar under control. If you use insulin, counting carbs helps you match the right amount of insulin to the number of grams of carbs in a meal. Then you can change your diet and insulin dose as needed. Testing your blood sugar several times a day can help you learn how carbs affect your blood sugar. A registered dietitian or certified diabetes educator can help you plan meals and snacks. Follow-up care is a key part of your treatment and safety. Be sure to make and go to all appointments, and call your doctor if you are having problems. It's also a good idea to know your test results and keep a list of the medicines you take. How can you care for yourself at home? Know your daily amount of carbohydrates Your daily amount depends on several things, such as your weight, how active you are, which diabetes medicines you take, and what your goals are for your blood sugar levels. A registered dietitian or certified diabetes educator can help you plan how many carbs to include in each meal and snack. For most adults, a guideline for the daily amount of carbs is: · 45 to 60 grams at each meal. That's about the same as 3 to 4 carbohydrate servings. · 15 to 20 grams at each snack. That's about the same as 1 carbohydrate serving. Count carbs Counting carbs lets you know how much rapid-acting insulin to take before you eat. If you use an insulin pump, you get a constant rate of insulin during the day. So the pump must be programmed at meals. This gives you extra insulin to cover the rise in blood sugar after meals. If you take insulin: 
· Learn your own insulin-to-carb ratio. You and your diabetes health professional will figure out the ratio. You can do this by testing your blood sugar after meals. For example, you may need a certain amount of insulin for every 15 grams of carbs. · Add up the carb grams in a meal. Then you can figure out how many units of insulin to take based on your insulin-to-carb ratio. · Exercise lowers blood sugar. You can use less insulin than you would if you were not doing exercise. Keep in mind that timing matters. If you exercise within 1 hour after a meal, your body may need less insulin for that meal than it would if you exercised 3 hours after the meal. Test your blood sugar to find out how exercise affects your need for insulin. If you do or don't take insulin: 
· Look at labels on packaged foods. This can tell you how many carbs are in a serving. You can also use guides from the American Diabetes Association. · Be aware of portions, or serving sizes. If a package has two servings and you eat the whole package, you need to double the number of grams of carbohydrate listed for one serving. · Protein, fat, and fiber do not raise blood sugar as much as carbs do. If you eat a lot of these nutrients in a meal, your blood sugar will rise more slowly than it would otherwise. Eat from all food groups · Eat at least three meals a day. · Plan meals to include food from all the food groups. The food groups include grains, fruits, dairy, proteins, and vegetables. · Talk to your dietitian or diabetes educator about ways to add limited amounts of sweets into your meal plan. · If you drink alcohol, talk to your doctor. It may not be recommended when you are taking certain diabetes medicines. Where can you learn more? Go to http://shakir-ros.info/. Enter A554 in the search box to learn more about \"Counting Carbohydrates: Care Instructions. \" Current as of: July 25, 2018 Content Version: 11.9 © 3598-0144 [employer]. Care instructions adapted under license by [employer] (which disclaims liability or warranty for this information). If you have questions about a medical condition or this instruction, always ask your healthcare professional. [place] [address] any warranty or liability for your use of this information. A Healthy Heart: Care Instructions Your Care Instructions Heart disease occurs when a substance called plaque builds up in the vessels that supply oxygen-rich blood to your heart. This can narrow the blood vessels and reduce blood flow. A heart attack happens when blood flow is completely blocked. A high-fat diet, smoking, and other factors increase the risk of heart disease. Your doctor has found that you have a chance of having heart disease. You can do lots of things to keep your heart healthy. It may not be easy, but you can change your diet, exercise more, and quit smoking. These steps really work to lower your chance of heart disease. Follow-up care is a key part of your treatment and safety. Be sure to make and go to all appointments, and call your doctor if you are having problems. It's also a good idea to know your test results and keep a list of the medicines you take. How can you care for yourself at home? Diet 
  · Use less salt when you cook and eat. This helps lower your blood pressure. Taste food before salting. Add only a little salt when you think you need it. With time, your taste buds will adjust to less salt.   · Eat fewer snack items, fast foods, canned soups, and other high-salt, high-fat, processed foods.  
  · Read food labels and try to avoid saturated and trans fats. They increase your risk of heart disease by raising cholesterol levels.  
  · Limit the amount of solid fat-butter, margarine, and shortening-you eat. Use olive, peanut, or canola oil when you cook. Bake, broil, and steam foods instead of frying them.  
  · Eating fish can lower your risk for heart disease. Eat at least 2 servings of fish a week. [place], mackerel, herring, sardines, and chunk light tuna are very good choices. These fish contain omega-3 fatty acids.  
  · Eat a variety of fruit and vegetables every day. Dark green, deep orange, red, or yellow fruits and vegetables are especially good for you. Examples include spinach, carrots, peaches, and berries.  
  · Foods high in fiber can reduce your cholesterol and provide important vitamins and minerals. High-fiber foods include whole-grain cereals and breads, oatmeal, beans, brown rice, citrus fruits, and apples.  
  · Limit drinks and foods with added sugar. These include candy, desserts, and soda pop.  
 Lifestyle changes 
  · If your doctor recommends it, get more exercise. Walking is a good choice. Bit by bit, increase the amount you walk every day. Try for at least 30 minutes on most days of the week. You also may want to swim, bike, or do other activities.  
  · Do not smoke. If you need help quitting, talk to your doctor about stop-smoking programs and medicines. These can increase your chances of quitting for good. Quitting smoking may be the most important step you can take to protect your heart. It is never too late to quit. You will get health benefits right away.  
  · Limit alcohol to 2 drinks a day for men and 1 drink a day for women. Too much alcohol can cause health problems. Medicines 
  · Take your medicines exactly as prescribed.  Call your doctor if you think you are having a problem with your medicine.  
  · If your doctor recommends aspirin, take the amount directed each day. Make sure you take aspirin and not another kind of pain reliever, such as acetaminophen (Tylenol). If you take ibuprofen (such as Advil or Motrin) for other problems, take aspirin at least 2 hours before taking ibuprofen. When should you call for help? Call 911 if you have symptoms of a heart attack. These may include: 
  · Chest pain or pressure, or a strange feeling in the chest.  
  · Sweating.  
  · Shortness of breath.  
  · Pain, pressure, or a strange feeling in the back, neck, jaw, or upper belly or in one or both shoulders or arms.  
  · Lightheadedness or sudden weakness.  
  · A fast or irregular heartbeat.  
 After you call 911, the  may tell you to chew 1 adult-strength or 2 to 4 low-dose aspirin. Wait for an ambulance. Do not try to drive yourself. 
 Watch closely for changes in your health, and be sure to contact your doctor if you have any problems. Where can you learn more? Go to http://shakirQuadrant 4 [employer] Corporationros.info/. Enter X805 in the search box to learn more about \"A Healthy Heart: Care Instructions. \" Current as of: July 22, 2018 Content Version: 11.9 © 1445-8238 [employer]. Care instructions adapted under license by [employer] (which disclaims liability or warranty for this information). If you have questions about a medical condition or this instruction, always ask your healthcare professional. [place] [address] any warranty or liability for your use of this information. DASH Diet: Care Instructions Your Care Instructions The DASH diet is an eating plan that can help lower your blood pressure. DASH stands for Dietary Approaches to Stop Hypertension. Hypertension is high blood pressure.  
The DASH diet focuses on eating foods that are high in calcium, potassium, and magnesium. These nutrients can lower blood pressure. The foods that are highest in these nutrients are fruits, vegetables, low-fat dairy products, nuts, seeds, and legumes. But taking calcium, potassium, and magnesium supplements instead of eating foods that are high in those nutrients does not have the same effect. The DASH diet also includes whole grains, fish, and poultry. The DASH diet is one of several lifestyle changes your doctor may recommend to lower your high blood pressure. Your doctor may also want you to decrease the amount of sodium in your diet. Lowering sodium while following the DASH diet can lower blood pressure even further than just the DASH diet alone. Follow-up care is a key part of your treatment and safety. Be sure to make and go to all appointments, and call your doctor if you are having problems. It's also a good idea to know your test results and keep a list of the medicines you take. How can you care for yourself at home? Following the DASH diet · Eat 4 to 5 servings of fruit each day. A serving is 1 medium-sized piece of fruit, ½ cup chopped or canned fruit, 1/4 cup dried fruit, or 4 ounces (½ cup) of fruit juice. Choose fruit more often than fruit juice. · Eat 4 to 5 servings of vegetables each day. A serving is 1 cup of lettuce or raw leafy vegetables, ½ cup of chopped or cooked vegetables, or 4 ounces (½ cup) of vegetable juice. Choose vegetables more often than vegetable juice. · Get 2 to 3 servings of low-fat and fat-free dairy each day. A serving is 8 ounces of milk, 1 cup of yogurt, or 1 ½ ounces of cheese. · Eat 6 to 8 servings of grains each day. A serving is 1 slice of bread, 1 ounce of dry cereal, or ½ cup of cooked rice, pasta, or cooked cereal. Try to choose whole-grain products as much as possible. · Limit lean meat, poultry, and fish to 2 servings each day. A serving is 3 ounces, about the size of a deck of cards. · Eat 4 to 5 servings of nuts, seeds, and legumes (cooked dried beans, lentils, and split peas) each week. A serving is 1/3 cup of nuts, 2 tablespoons of seeds, or ½ cup of cooked beans or peas. · Limit fats and oils to 2 to 3 servings each day. A serving is 1 teaspoon of vegetable oil or 2 tablespoons of salad dressing. · Limit sweets and added sugars to 5 servings or less a week. A serving is 1 tablespoon jelly or jam, ½ cup sorbet, or 1 cup of lemonade. · Eat less than 2,300 milligrams (mg) of sodium a day. If you limit your sodium to 1,500 mg a day, you can lower your blood pressure even more. Tips for success · Start small. Do not try to make dramatic changes to your diet all at once. You might feel that you are missing out on your favorite foods and then be more likely to not follow the plan. Make small changes, and stick with them. Once those changes become habit, add a few more changes. · Try some of the following: ? Make it a goal to eat a fruit or vegetable at every meal and at snacks. This will make it easy to get the recommended amount of fruits and vegetables each day. ? Try yogurt topped with fruit and nuts for a snack or healthy dessert. ? Add lettuce, tomato, cucumber, and onion to sandwiches. ? Combine a ready-made pizza crust with low-fat mozzarella cheese and lots of vegetable toppings. Try using tomatoes, squash, spinach, broccoli, carrots, cauliflower, and onions. ? Have a variety of cut-up vegetables with a low-fat dip as an appetizer instead of chips and dip. ? Sprinkle sunflower seeds or chopped almonds over salads. Or try adding chopped walnuts or almonds to cooked vegetables. ? Try some vegetarian meals using beans and peas. Add garbanzo or kidney beans to salads. Make burritos and tacos with mashed rosario beans or black beans. Where can you learn more? Go to http://shakir-ors.info/.  
Enter O325 in the search box to learn more about \"DASH Diet: Care Instructions. \" Current as of: July 22, 2018 Content Version: 11.9 © 0747-0516 [employer], [facility]. Care instructions adapted under license by [employer] (which disclaims liability or warranty for this information). If you have questions about a medical condition or this instruction, always ask your healthcare professional. [place] [address] any warranty or liability for your use of this information.

## 2019-03-19 NOTE — TELEPHONE ENCOUNTER
Pt called and scheduled for OP Cardiac Rehab on April 11,2019 at 2:30 pm.Orientation packet with scheduled date and time mailed to confirmed address.

## 2019-03-19 NOTE — ACP (ADVANCE CARE PLANNING)
ACP: Spoke to patient today. Patient does not have ACP on file, patient accepted education and is interested in ACP. ACP information packet mailed to patients home address. Patient/family to review and make list of any questions to review w/ NN. NN to f/u 1 week.  AR

## 2019-03-19 NOTE — ED PROVIDER NOTES
EMERGENCY DEPARTMENT HISTORY AND PHYSICAL EXAM      Date: 3/18/2019  Patient Name: Toñito Ewing    History of Presenting Illness     Chief Complaint   Patient presents with    Numbness     Pt reports bilateral numbness to legs and cool extremityies since having a Cardiac stent placed through her right groin on Thursday       History Provided By: Patient    HPI: Toñito Ewing, 79 y.o. female with PMHx significant for DM, presents as walkin to the ED with cc of feet numbness and coolness. Had cardiac cath on Thursday. States it is in both of the feet with some pins and needles sensation. No hx of diabetic neuropathy. Denies anything that makes it better or worse. There are no other complaints, changes, or physical findings at this time. PCP: Nolberto Mcgee MD    No current facility-administered medications on file prior to encounter. Current Outpatient Medications on File Prior to Encounter   Medication Sig Dispense Refill    metoprolol tartrate (LOPRESSOR) 25 mg tablet Take 1 Tab by mouth daily. 180 Tab 3    aspirin 81 mg chewable tablet Take 1 Tab by mouth daily. 30 Tab 6    ondansetron (ZOFRAN ODT) 4 mg disintegrating tablet Take 1 Tab by mouth every six (6) hours as needed for Nausea. 30 Tab 1    DULoxetine (CYMBALTA) 30 mg capsule Take 1 Cap by mouth daily. 90 Cap 3    traZODone (DESYREL) 50 mg tablet TAKE 1 TABLET BY MOUTH EVERY NIGHT AT BEDTIME 90 Tab 3    benzonatate (TESSALON) 100 mg capsule Take 1 Cap by mouth three (3) times daily as needed for Cough for up to 7 days. 30 Cap 1    atorvastatin (LIPITOR) 40 mg tablet Take 1 Tab by mouth nightly. 30 Tab 12    ticagrelor (BRILINTA) 90 mg tablet Take 1 Tab by mouth every twelve (12) hours every twelve (12) hours. 60 Tab 12    Blood-Glucose Meter (ACCU-CHEK KRISTIE PLUS METER) misc by Does Not Apply route. 1 Each 0    glucose blood VI test strips (ACCU-CHEK KRISTIE PLUS TEST STRP) strip by Does Not Apply route See Admin Instructions.  100 Strip 3    Lancing Device with Lancets (ACCU-CHEK SOFT DEV LANCETS) kit by Does Not Apply route. 1 Kit 11    chlorpheniramine-HYDROcodone (TUSSIONEX) 10-8 mg/5 mL suspension Take 5 mL by mouth every twelve (12) hours as needed for Cough. Max Daily Amount: 10 mL. 115 mL 0    albuterol (PROAIR HFA) 90 mcg/actuation inhaler Take 2 Puffs by inhalation every four (4) hours as needed for Shortness of Breath. Usually uses at least once daily. 1 Inhaler 11    oxyCODONE (OXYIR) 5 mg capsule Take 1 Cap by mouth every four (4) hours as needed. Max Daily Amount: 30 mg. 20 Cap 0    albuterol-ipratropium (DUO-NEB) 2.5 mg-0.5 mg/3 ml nebu USE 1 VIAL VIA NEBULIZER EVERY 4 HOURS AS NEEDED 1620 mL 1    budesonide-formoterol (SYMBICORT) 80-4.5 mcg/actuation HFAA Take 2 Puffs by inhalation two (2) times a day. 1 Inhaler 11    sulfacetamide (BLEPH-10) 10 % ophthalmic solution Administer 1 Drop to left eye every three (3) hours. 1 Bottle 1    losartan (COZAAR) 50 mg tablet TAKE 1 TABLET BY MOUTH DAILY 90 Tab 11    mirtazapine (REMERON) 30 mg tablet TAKE 1 TABLET BY MOUTH EVERY DAY AT BEDTIME FOR APPETITE SLEEP AND ANXIETY 90 Tab 6    loratadine (CLARITIN) 10 mg tablet TAKE 1 TABLET BY MOUTH EVERY DAY 90 Tab 3    BD INSULIN SYRINGE ULTRA-FINE 1 mL 31 gauge x 5/16 syrg USE AS DIRECTED TWICE A  Syringe 0    NOVOLIN N NPH U-100 INSULIN 100 unit/mL injection INJECT 20 UNITS SUBCUTANEOUSLY TWICE A DAY 40 mL 2    cetirizine (ZYRTEC) 10 mg tablet Take 10 mg by mouth nightly.  Cholecalciferol, Vitamin D3, 50,000 unit cap Take 1 Cap by mouth Every Thursday.  omeprazole (PRILOSEC) 20 mg capsule Take 20 mg by mouth daily.  metFORMIN (GLUCOPHAGE) 1,000 mg tablet Take 1,000 mg by mouth two (2) times daily (with meals).  Patient unsure of dose or frequency - Rx Query states filled 2/18 #60 for 30 day supply         Past History     Past Medical History:  Past Medical History:   Diagnosis Date    Anxiety     Asthma  COPD     Diabetes ([facility] [address]     Dyspepsia and other specified disorders of function of stomach     Hemorrhoids     Hypertension     Rheumatoid arthritis ([facility] 5/8/2018       Past Surgical History:  Past Surgical History:   Procedure Laterality Date    HX BREAST BIOPSY Left     benign    HX GYN      Thermal Ablation    HX HEART CATHETERIZATION      exploration    HX LAPAROTOMY      HX ORTHOPAEDIC      right knee    HX OTHER SURGICAL      Colonoscopy    HX TUBAL LIGATION         Family History:  Family History   Problem Relation Age of Onset    Heart Disease Father     Cancer Sister        Social History:  Social History     Tobacco Use    Smoking status: Current Every Day Smoker     Packs/day: 0.25     Years: 38.00     Pack years: 9.50    Smokeless tobacco: Never Used   Substance Use Topics    Alcohol use: Yes     Alcohol/week: 1.0 oz     Types: 2 Glasses of wine per week     Comment: occasionally    Drug use: No       Allergies: Allergies   Allergen Reactions    Buspirone Other (comments)     jittery    Tylenol-Codeine #3 [Acetaminophen-Codeine] Itching     Patient states she cannot tolerate APAP         Review of Systems   Review of Systems   Constitutional: Negative for chills and fever. Respiratory: Negative for cough and shortness of breath. Cardiovascular: Negative for chest pain. Gastrointestinal: Negative for constipation, diarrhea, nausea and vomiting. Musculoskeletal: Positive for arthralgias (foot pains). Neurological: Negative for weakness and numbness. All other systems reviewed and are negative. Physical Exam   Physical Exam   Constitutional: She appears well-developed. No distress. HENT:   Head: Normocephalic and atraumatic. Eyes: EOM are normal.   Neck: Normal range of motion. Cardiovascular: Normal rate and regular rhythm. Well perfused   Pulmonary/Chest: Effort normal. No respiratory distress. Musculoskeletal: Normal range of motion.  She exhibits no edema or deformity. Great pulses of her DPP bilaterally, slightly cold toes but good cap refill. No evidence of infection . Neurological: She is alert. Psychiatric: She has a normal mood and affect. Diagnostic Study Results     Labs -     Recent Results (from the past 12 hour(s))   AMB POC HEMOGLOBIN A1C    Collection Time: 03/18/19  3:08 PM   Result Value Ref Range    Hemoglobin A1c (POC) 6.4 (A) 4.8 - 5.6 %   CBC WITH AUTOMATED DIFF    Collection Time: 03/18/19  4:28 PM   Result Value Ref Range    WBC 7.6 3.6 - 11.0 K/uL    RBC 2.93 (L) 3.80 - 5.20 M/uL    HGB 9.7 (L) 11.5 - 16.0 g/dL    HCT 28.4 (L) 35.0 - 47.0 %    MCV 96.9 80.0 - 99.0 FL    MCH 33.1 26.0 - 34.0 PG    MCHC 34.2 30.0 - 36.5 g/dL    RDW 12.4 11.5 - 14.5 %    PLATELET 140 922 - 629 K/uL    MPV 10.7 8.9 - 12.9 FL    NRBC 0.0 0  WBC    ABSOLUTE NRBC 0.00 0.00 - 0.01 K/uL    NEUTROPHILS 65 32 - 75 %    LYMPHOCYTES 21 12 - 49 %    MONOCYTES 10 5 - 13 %    EOSINOPHILS 2 0 - 7 %    BASOPHILS 1 0 - 1 %    IMMATURE GRANULOCYTES 1 (H) 0.0 - 0.5 %    ABS. NEUTROPHILS 5.0 1.8 - 8.0 K/UL    ABS. LYMPHOCYTES 1.6 0.8 - 3.5 K/UL    ABS. MONOCYTES 0.8 0.0 - 1.0 K/UL    ABS. EOSINOPHILS 0.1 0.0 - 0.4 K/UL    ABS. BASOPHILS 0.0 0.0 - 0.1 K/UL    ABS. IMM.  GRANS. 0.0 0.00 - 0.04 K/UL    DF AUTOMATED     METABOLIC PANEL, BASIC    Collection Time: 03/18/19  4:28 PM   Result Value Ref Range    Sodium 138 136 - 145 mmol/L    Potassium 3.1 (L) 3.5 - 5.1 mmol/L    Chloride 106 97 - 108 mmol/L    CO2 23 21 - 32 mmol/L    Anion gap 9 5 - 15 mmol/L    Glucose 169 (H) 65 - 100 mg/dL    BUN 7 6 - 20 MG/DL    Creatinine 0.84 0.55 - 1.02 MG/DL    BUN/Creatinine ratio 8 (L) 12 - 20      GFR est AA >60 >60 ml/min/1.73m2    GFR est non-AA >60 >60 ml/min/1.73m2    Calcium 8.4 (L) 8.5 - 10.1 MG/DL       Radiologic Studies -   No orders to display     CT Results  (Last 48 hours)    None        CXR Results  (Last 48 hours)    None            Medical Decision Making I am the first provider for this patient. I reviewed the vital signs, available nursing notes, past medical history, past surgical history, family history and social history. Vital Signs-Reviewed the patient's vital signs. Patient Vitals for the past 12 hrs:   Temp Pulse Resp BP SpO2   03/18/19 1615 98.2 °F (36.8 °C) (!) 103 16 128/72 100 %       Records Reviewed: Nursing Notes    Provider Notes (Medical Decision Making):   PT with bilateral feet numbness. Unlikely stroke or related to groin cath. Possibly Diabetic neuropathy. Has good blood flow so unlikely PAD. Will get labs to r/o electrolyte anomaly, check sugar and tx with gabapentin. ED Course:   Initial assessment performed. The patients presenting problems have been discussed, and they are in agreement with the care plan formulated and outlined with them. I have encouraged them to ask questions as they arise throughout their visit. Critical Care Time:   0  Disposition:  Discharge Note:  The patient has been re-evaluated and is ready for discharge. Reviewed available results with patient. Counseled patient on diagnosis and care plan. Patient has expressed understanding, and all questions have been answered. Patient agrees with plan and agrees to follow up as recommended, or to return to the ED if their symptoms worsen. Discharge instructions have been provided and explained to the patient, along with reasons to return to the ED. PLAN:  1. Discharge Medication List as of 3/18/2019  6:51 PM      START taking these medications    Details   gabapentin (NEURONTIN) 300 mg capsule Take 1 Cap by mouth three (3) times daily. , Normal, Disp-30 Cap, R-0      potassium chloride SR (KLOR-CON 10) 10 mEq tablet Take 2 Tabs by mouth two (2) times a day., Normal, Disp-30 Tab, R-0         CONTINUE these medications which have NOT CHANGED    Details   metoprolol tartrate (LOPRESSOR) 25 mg tablet Take 1 Tab by mouth daily. , Normal, Disp-180 Tab, R-3 aspirin 81 mg chewable tablet Take 1 Tab by mouth daily. , Normal, Disp-30 Tab, R-6      ondansetron (ZOFRAN ODT) 4 mg disintegrating tablet Take 1 Tab by mouth every six (6) hours as needed for Nausea., Normal, Disp-30 Tab, R-1      DULoxetine (CYMBALTA) 30 mg capsule Take 1 Cap by mouth daily. , Normal**Patient requests 90 days supply**Disp-90 Cap, R-3      traZODone (DESYREL) 50 mg tablet TAKE 1 TABLET BY MOUTH EVERY NIGHT AT BEDTIME, Normal**Patient requests 90 days supply**Disp-90 Tab, R-3      benzonatate (TESSALON) 100 mg capsule Take 1 Cap by mouth three (3) times daily as needed for Cough for up to 7 days. , Normal, Disp-30 Cap, R-1      atorvastatin (LIPITOR) 40 mg tablet Take 1 Tab by mouth nightly. , Print, Disp-30 Tab, R-12      ticagrelor (BRILINTA) 90 mg tablet Take 1 Tab by mouth every twelve (12) hours every twelve (12) hours. , Print, Disp-60 Tab, R-12      Blood-Glucose Meter (ACCU-CHEK KRISTIE PLUS METER) misc by Does Not Apply route., NormalDiagnosis: E11.9Disp-1 Each, R-0      glucose blood VI test strips (ACCU-CHEK KRISTIE PLUS TEST STRP) strip by Does Not Apply route See Admin Instructions. , Normal, Disp-100 Strip, R-3      Lancing Device with Lancets (ACCU-CHEK SOFT DEV LANCETS) kit by Does Not Apply route., NormalDiagnosis Diagnosis E11.9Disp-1 Kit, R-11      chlorpheniramine-HYDROcodone (TUSSIONEX) 10-8 mg/5 mL suspension Take 5 mL by mouth every twelve (12) hours as needed for Cough. Max Daily Amount: 10 mL., Print, Disp-115 mL, R-0      albuterol (PROAIR HFA) 90 mcg/actuation inhaler Take 2 Puffs by inhalation every four (4) hours as needed for Shortness of Breath. Usually uses at least once daily. , Normal, Disp-1 Inhaler, R-11      oxyCODONE (OXYIR) 5 mg capsule Take 1 Cap by mouth every four (4) hours as needed.  Max Daily Amount: 30 mg., Print, Disp-20 Cap, R-0      albuterol-ipratropium (DUO-NEB) 2.5 mg-0.5 mg/3 ml nebu USE 1 VIAL VIA NEBULIZER EVERY 4 HOURS AS NEEDED, Normal**Patient requests 90 days supply**Disp-1620 mL, R-1      budesonide-formoterol (SYMBICORT) 80-4.5 mcg/actuation HFAA Take 2 Puffs by inhalation two (2) times a day., Normal, Disp-1 Inhaler, R-11      sulfacetamide (BLEPH-10) 10 % ophthalmic solution Administer 1 Drop to left eye every three (3) hours. , Normal, Disp-1 Bottle, R-1      losartan (COZAAR) 50 mg tablet TAKE 1 TABLET BY MOUTH DAILY, Normal**Patient requests 90 days supply**Disp-90 Tab, R-11      mirtazapine (REMERON) 30 mg tablet TAKE 1 TABLET BY MOUTH EVERY DAY AT BEDTIME FOR APPETITE SLEEP AND ANXIETY, Normal**Patient requests 90 days supply**Disp-90 Tab, R-6      loratadine (CLARITIN) 10 mg tablet TAKE 1 TABLET BY MOUTH EVERY DAY, Normal, Disp-90 Tab, R-3      BD INSULIN SYRINGE ULTRA-FINE 1 mL 31 gauge x 5/16 syrg USE AS DIRECTED TWICE A DAY, Normal, Disp-200 Syringe, R-0      NOVOLIN N NPH U-100 INSULIN 100 unit/mL injection INJECT 20 UNITS SUBCUTANEOUSLY TWICE A DAY, Normal, Disp-40 mL, R-2      cetirizine (ZYRTEC) 10 mg tablet Take 10 mg by mouth nightly., Historical Med      Cholecalciferol, Vitamin D3, 50,000 unit cap Take 1 Cap by mouth Every Thursday., Historical Med      omeprazole (PRILOSEC) 20 mg capsule Take 20 mg by mouth daily. , Historical Med      metFORMIN (GLUCOPHAGE) 1,000 mg tablet Take 1,000 mg by mouth two (2) times daily (with meals). Patient unsure of dose or frequency - Rx Query states filled 2/18 #60 for 30 day supply, Historical Med           2. Follow-up Information     Follow up With Specialties Details Why Contact Maribel Perez MD Internal Medicine Schedule an appointment as soon as possible for a visit  [address] [place] [address]  [phone]          Return to ED if worse     Diagnosis     Clinical Impression:   1.  Neuropathic pain of both feet        Attestations:    Cirilo Pacheco M.D.

## 2019-03-29 ENCOUNTER — PATIENT OUTREACH (OUTPATIENT)
Dept: INTERNAL MEDICINE CLINIC | Age: 69
End: 2019-03-29

## 2019-04-01 DIAGNOSIS — E11.9 TYPE 2 DIABETES MELLITUS WITHOUT COMPLICATION, WITH LONG-TERM CURRENT USE OF INSULIN (HCC): Primary | ICD-10-CM

## 2019-04-01 DIAGNOSIS — Z79.4 TYPE 2 DIABETES MELLITUS WITHOUT COMPLICATION, WITH LONG-TERM CURRENT USE OF INSULIN (HCC): Primary | ICD-10-CM

## 2019-04-01 RX ORDER — ASPIRIN 325 MG
50000 TABLET, DELAYED RELEASE (ENTERIC COATED) ORAL
Qty: 4 CAP | Refills: 5 | Status: SHIPPED | OUTPATIENT
Start: 2019-04-04 | End: 2020-01-10

## 2019-04-01 RX ORDER — LANCING DEVICE/LANCETS
KIT MISCELLANEOUS
Qty: 1 KIT | Refills: 11 | Status: SHIPPED | OUTPATIENT
Start: 2019-04-01

## 2019-04-01 NOTE — PROGRESS NOTES
Goals  Prepare patients and caregivers for end of life decisions (ie. need for hospice, pain management, symptom relief, advance directives etc.)   
  03/19/19 ACP: Spoke to patient today. Patient does not have ACP on file, patient accepted education and is interested in ACP. ACP information packet mailed to patients home address. Patient/family to review and make list of any questions to review w/ NN. NN to f/u 1 week. AR  
 
04/01/19 ACP: Spoke to patient today. Patient has not received ACP information. Patient to check mail and speak to roommates and if it does not arrive NN will resend at next encounter. NN to f/u 1 week. AR 
 
  
  Smoking cessation. 03/19/19 Educated patient on smoking cessation. Patient states that she has not smoked since before she went to the hospital and would like to quit. Encouraged patient on choosing new hobby to replace smoking. Patient states that she hasn't had the desire to smoke. Advised patient to contact NN if she gets urge to smoke before next call. NN to f/u 1 week. AR 
 
04/01/19 Spoke to patient today, patient states that she has not smoked since prior to hospitalization. She states she does not even have cigarettes in the home. She states she has not really had the urge to smoke. Provided positive feedback to patient and encouraged her to keep up what she is doing to avoid smoking. NN to f/u 1 week. AR 
  
  Supportive resources in place to maintain patient in the community (ie. Home Health, DME equipment, refer to, medication assistant plan, etc.)   
  03/19/19 Patient attended f/u with Dr. Kavita Grace yesterday. Was told to contact cardiology regarding numbness and was advised to go to ER. Patient was dx with neuropathy and started on Gabapentin and K+ for K level of 3.1 Patient reported that she had those medications from the pharmacy.  Discussed cardiac and diabetic diet, provided education and mailed information. Glucose levels reported today as 121 - patient reports that she checks it BID 
 
- Make rheumatology appt - Make psych appt  
- Dr. Kenton Gray f/u - scheduled in 2 weeks per patient. - Monitor PO intake, start glucerna shake for extra nutrition - Review diabetic and cardiac dietary information mailed to patient - BID glucose checks and keep log Patient verbalized understanding of the above plan. NN to f/u 1 week. AR 
 
04/01/19 Spoke to patient today. Patient reported her glucose levels at  the last week. Patient continues to take gabapentin and states that it has helped her numbness in her feet but she still has it some. Encouraged patient to inspect feet daily for cuts, bruises, lacerations, wear shoes at all times when OOB, use caution when cutting toe nails etc. Patient states that she made the above appts but didn't have her calendar in front of her. She reports her eating has gotten better since she quit smoking. Patient has not received dietary information mailed yet. - rheumatology appt - psych appt  
- Dr. Kenton Gray f/u - scheduled in 1 weeks per patient. - Monitor PO intake, start glucerna shake for extra nutrition - Review diabetic and cardiac dietary information mailed to patient - BID glucose checks and keep log Patient verbalized understanding. NN to f/u 1 week. AR 
  
  Understands red flags post discharge. 03/19/19 Spoke to patient today. Reviewed red flag s/s with patient, nausea, vomiting, inability to pass urine/stool, SOB, mental status change, chest pain, fever. Patient verbalized understanding and will contact NN/MD if red flag s/s arise. NN to f/u 1 week. AR 
 
04/01/19 Reviewed red flag s/s with patient. Patient denies experiencing any at this time. Reminded patient of red flag s/s, signs of CVA and FAST. NN to f/u 1 week.  AR

## 2019-04-12 ENCOUNTER — PATIENT OUTREACH (OUTPATIENT)
Dept: INTERNAL MEDICINE CLINIC | Age: 69
End: 2019-04-12

## 2019-04-12 NOTE — PROGRESS NOTES
04/15/19 Due to the inability to communicate with the patient episode resolved at this time. No further follow up scheduled by this NN. Unable to perform post PORTILLO assessment due to inability to reach patient. To this NN knowledge patient has not had any unplanned utilization at this time.

## 2019-05-02 NOTE — TELEPHONE ENCOUNTER
Pt is requesting a refill on her one touch ultra 2 glucose test strips, and that the pricker device (broken) to be sent over to [facility] (on file).  Phone: [phone]       Message received & copied from [facility]

## 2019-05-03 RX ORDER — LANCING DEVICE
EACH MISCELLANEOUS
Qty: 1 EACH | Refills: 0 | Status: SHIPPED | OUTPATIENT
Start: 2019-05-03 | End: 2019-07-03 | Stop reason: SDUPTHER

## 2019-05-03 NOTE — TELEPHONE ENCOUNTER
PCP: Kiet Solares MD    Last appt: 3/18/2019  No future appointments. Requested Prescriptions     Pending Prescriptions Disp Refills    Lancing Device (LANCING DEVICE WITH LANCETS) misc 1 Each 0     Sig: Approved insurance lancet device. Use as directed to check blood sugar twice daily E11.9    glucose blood VI test strips (ASCENSIA AUTODISC VI, ONE TOUCH ULTRA TEST VI) strip 100 Strip 11     Sig: one touch ultra 2 strips.  Use as directed to check blood sugar twice daily E11.9

## 2019-07-05 RX ORDER — LANCETS 33 GAUGE
EACH MISCELLANEOUS
Qty: 100 LANCET | Refills: 0 | Status: SHIPPED | OUTPATIENT
Start: 2019-07-05 | End: 2019-10-10 | Stop reason: SDUPTHER

## 2019-07-25 ENCOUNTER — TELEPHONE (OUTPATIENT)
Dept: INTERNAL MEDICINE CLINIC | Age: 69
End: 2019-07-25

## 2019-07-25 DIAGNOSIS — R32 INCONTINENCE OF URINE IN FEMALE: Primary | ICD-10-CM

## 2019-07-25 NOTE — TELEPHONE ENCOUNTER
Patient states she needs a call back before the weekend in reference to being able to make it to the bathroom & Urinary retention. Patient is requesting to get a Bedside commode order so she can not have to clean up behind herself when she can't make it to the bathroom. Patient states she also needs to discuss getting a medication for her depression     Please call to discuss.  Thank you

## 2019-07-26 NOTE — TELEPHONE ENCOUNTER
I'm okay with the commode. Re depression: She is on cymbalta and remeron. We have referred her before to psychiatry. Perhaps we could get her in with Dr. Shawnee Raya.      BJF

## 2019-07-29 NOTE — TELEPHONE ENCOUNTER
Left a message for patient to contact this office. Patient has not been seen since March and no documentation for urinary incontinence in last  Office note which is needed to send  For bedside commode request. Patient needs to make an appointment  To address this.

## 2019-07-30 ENCOUNTER — TELEPHONE (OUTPATIENT)
Dept: INTERNAL MEDICINE CLINIC | Age: 69
End: 2019-07-30

## 2019-07-30 NOTE — TELEPHONE ENCOUNTER
Message was left for patient  To contact this office , mailbox was full. [facility] does not carry DME, [employer] needs a letter from the provider stating the need for  The commode and that patient is restricted to one room with no bathroom. She will  Need an appointment because her incontinence issues were not addressed at last office visit.

## 2019-07-30 NOTE — TELEPHONE ENCOUNTER
#[phone] pt states the insurance approved her commode. Please call pt as she states she has been waiting on a call back. Pt states she called this morning. Pt also needs pads that go on the bed.

## 2019-07-30 NOTE — LETTER
2019 5:17 PM 
 
Ms. Fabricio Fischer  [address] [id number] To Whom it may concern: 
 
 
 
 
I am witting on behalf of [address] Shayna Disha. Patient is requiring a commode because she has 
 
 limited mobility and unable to walk to  Her bathroom  Which is on the other side of the home. Patient' s incontinence has increased  In past month. Patient reports that she is wetting the 
 
 floor trying to get to the bathroom. Patient is also needing pads to go on her bed to keep from 
 
 soaking her sheet and mattress at night when she can't get out of bed in time. Dx T58.47 Sincerely Dr. Ema Warren

## 2019-07-31 NOTE — TELEPHONE ENCOUNTER
Monday, August 05, 2019 08:45 AM was scheduled  With Dr. Corey Fu to evaluate incontinence issues and not being able to ambulate. patient will call back if she can't ge transportation.

## 2019-07-31 NOTE — TELEPHONE ENCOUNTER
Pt is checking on the status of her prescriptions for anxiety and pain and for a commode chair. Pt can be reached at [phone].        Message received & copied from [facility]

## 2019-08-07 ENCOUNTER — TELEPHONE (OUTPATIENT)
Dept: INTERNAL MEDICINE CLINIC | Age: 69
End: 2019-08-07

## 2019-08-07 DIAGNOSIS — R32 URINARY INCONTINENCE, UNSPECIFIED TYPE: Primary | ICD-10-CM

## 2019-08-07 NOTE — TELEPHONE ENCOUNTER
Pt requesting commode and bed padding, but [facility] is requesting a letter of medical necessity. The phone number is [phone]. Pt is requesting deliver today if possible.        Message received & copied from [facility]

## 2019-08-09 NOTE — TELEPHONE ENCOUNTER
Spoke with Fabricio the commode is ready for , and [employer] does not  Carry disposable pads. Order faxed to [facility] for review.

## 2019-08-12 ENCOUNTER — TELEPHONE (OUTPATIENT)
Dept: INTERNAL MEDICINE CLINIC | Age: 69
End: 2019-08-12

## 2019-08-12 NOTE — TELEPHONE ENCOUNTER
Rafael with [address] is calling in regarding a request sent for incontinence supplies. He would like to discuss alternative supplies and also assist with other diabetic supplies needed. Best contact phone number is [phone] extension [phone].        Message received & copied from [facility]

## 2019-08-13 NOTE — TELEPHONE ENCOUNTER
Call placed to Jasvir Diez at [address] [place] [address] and message was left to contact this office for questions.

## 2019-09-17 ENCOUNTER — TELEPHONE (OUTPATIENT)
Dept: INTERNAL MEDICINE CLINIC | Age: 69
End: 2019-09-17

## 2019-10-08 DIAGNOSIS — E08.3213 DIABETES MELLITUS DUE TO UNDERLYING CONDITION WITH BOTH EYES AFFECTED BY MILD NONPROLIFERATIVE RETINOPATHY AND MACULAR EDEMA, WITHOUT LONG-TERM CURRENT USE OF INSULIN (HCC): Primary | ICD-10-CM

## 2019-10-08 DIAGNOSIS — E11.9 TYPE 2 DIABETES MELLITUS WITHOUT COMPLICATION, WITH LONG-TERM CURRENT USE OF INSULIN (HCC): ICD-10-CM

## 2019-10-08 DIAGNOSIS — Z79.4 TYPE 2 DIABETES MELLITUS WITHOUT COMPLICATION, WITH LONG-TERM CURRENT USE OF INSULIN (HCC): ICD-10-CM

## 2019-10-08 DIAGNOSIS — R52 PAIN: ICD-10-CM

## 2019-10-08 RX ORDER — BLOOD-GLUCOSE METER
EACH MISCELLANEOUS
Qty: 1 EACH | Refills: 0 | Status: SHIPPED | OUTPATIENT
Start: 2019-10-08

## 2019-10-08 NOTE — TELEPHONE ENCOUNTER
#[phone] pt states she needs something called in for pain. She can take tylenol #2 if you can call those in for her to     This is joint pain due to arthritis. Phone into [employer] [facility] [address] (on file)     Pt is asking for a call back today yet as she needs this to take to ease the pain.

## 2019-10-09 NOTE — TELEPHONE ENCOUNTER
Pt states the wrong glucose meter was sent to her. She wants the One Touch Ultra sent and also strips to go with that. Pt hasn't checked blood sugar and needs to as she has numbness in fingertips. Pt is asking about the tylenol she requested as well? Please call pt to let her know when this has all been done or if you have questions as well.

## 2019-10-10 ENCOUNTER — TELEPHONE (OUTPATIENT)
Dept: INTERNAL MEDICINE CLINIC | Age: 69
End: 2019-10-10

## 2019-10-10 NOTE — ASSESSMENT & PLAN NOTE
Key Antihyperglycemic Medications             insulin NPH (NOVOLIN N NPH U-100 INSULIN) 100 unit/mL injection INJECT 20 UNITS SUBCUTANEOUSLY TWICE A DAY    metFORMIN (GLUCOPHAGE) 1,000 mg tablet Take 1,000 mg by mouth two (2) times daily (with meals). Patient unsure of dose or frequency - Rx Query states filled 2/18 #60 for 30 day supply        Other Key Diabetic Medications             gabapentin (NEURONTIN) 300 mg capsule Take 1 Cap by mouth three (3) times daily. atorvastatin (LIPITOR) 40 mg tablet Take 1 Tab by mouth nightly.     losartan (COZAAR) 50 mg tablet TAKE 1 TABLET BY MOUTH DAILY        Lab Results   Component Value Date/Time    Hemoglobin A1c 6.5 10/29/2018 12:13 PM    Hemoglobin A1c (POC) 6.4 03/18/2019 03:08 PM    Glucose 169 03/18/2019 04:28 PM    Creatinine 0.84 03/18/2019 04:28 PM    Cholesterol, total 190 10/29/2018 12:13 PM    HDL Cholesterol 62 10/29/2018 12:13 PM    LDL, calculated 75 10/29/2018 12:13 PM    Triglyceride 264 10/29/2018 12:13 PM     Diabetic Foot and Eye Exam HM Status   Topic Date Due    Diabetic Foot Care  07/16/1960    Eye Exam  07/16/1960

## 2019-10-10 NOTE — TELEPHONE ENCOUNTER
[facility] Front Office Pool             Level 1/Escalated Issue       Caller's first and last name and relationship (if not the patient):       Best contact number(s):   [phone]     What are the symptoms:   Hot and cold, numb fingers, leg pain     Transfer successful - yes/no (include outcome):   No, told to send msg     Transfer declined - yes/no (include reason):   Yes, told to send msg     Was caller advised to seek appropriate level of care - yes/no:   Yes     Details to clarify the request:   Pt states she has not received her needles for for one touch so she has not been able to check her sugar. Pt also states she has been experiencing the symptoms listed above.        Khoire received & copied from [facility]

## 2019-10-11 RX ORDER — ACETAMINOPHEN AND CODEINE PHOSPHATE 300; 15 MG/1; MG/1
1 TABLET ORAL
Qty: 30 TAB | Refills: 0 | Status: SHIPPED | OUTPATIENT
Start: 2019-10-11 | End: 2019-10-14

## 2019-10-11 RX ORDER — INSULIN PUMP SYRINGE, 3 ML
EACH MISCELLANEOUS
Qty: 1 KIT | Refills: 0 | Status: SHIPPED | OUTPATIENT
Start: 2019-10-11

## 2019-10-11 RX ORDER — LANCETS 33 GAUGE
EACH MISCELLANEOUS
Qty: 100 LANCET | Refills: 0 | Status: SHIPPED | OUTPATIENT
Start: 2019-10-11 | End: 2019-11-30 | Stop reason: SDUPTHER

## 2019-10-11 NOTE — TELEPHONE ENCOUNTER
The orders for Diabetic Supplies were escribed to [facility]. Identified patient 2 identifiers verified. Patient aware.

## 2019-10-11 NOTE — TELEPHONE ENCOUNTER
Pt states that she has not checked her glucose levels in a while because she does not have her meter and needs on as soon as possible. Pt also states that she has been having numbness in her hands. Please call.

## 2019-10-11 NOTE — TELEPHONE ENCOUNTER
Medication below has been phoned in to [facility].    acetaminophen-codeine  acetaminophen-codeine (TYLENOL #2) 300-15 mg tab  Take 1 Tab by mouth every four (4) hours as needed for Pain for up to 3 days. Max Daily Amount: 6 Tabs., Print, Disp-30 Tab, R-0   Dispense: 30 Tab   Refills: 0 ordered   Pharmacy: [facility]/pharmacy #[id number] (Ph: [phone])   Order Details  Ordered on: 10/11/19   Associated Dx: Pain   End date: 10/14/19   Authorizing provider: Ava Tuttle MD

## 2019-10-31 NOTE — TELEPHONE ENCOUNTER
Spoke with Heron Rg the authorization for the One Touch Meter is in  review and this office will be faxed a decision.

## 2019-10-31 NOTE — TELEPHONE ENCOUNTER
Patient states she needs a call back in reference to her One Touch Ultra Meter that was done on 10/11/19 & is having a Hard time getting New Meter. Patient states a call needs to be made to the pharmacy for Override approval. Please call the patient if any questions or when this has been done because patient states she has not been able to test for over a week.  Thank you

## 2019-12-02 RX ORDER — LANCETS 33 GAUGE
EACH MISCELLANEOUS
Qty: 100 LANCET | Refills: 0 | Status: SHIPPED | OUTPATIENT
Start: 2019-12-02

## 2019-12-21 DIAGNOSIS — E11.9 TYPE 2 DIABETES MELLITUS WITHOUT COMPLICATION, WITH LONG-TERM CURRENT USE OF INSULIN (HCC): ICD-10-CM

## 2019-12-21 DIAGNOSIS — Z79.4 TYPE 2 DIABETES MELLITUS WITHOUT COMPLICATION, WITH LONG-TERM CURRENT USE OF INSULIN (HCC): ICD-10-CM

## 2020-01-06 RX ORDER — GUAIFENESIN 100 MG/5ML
LIQUID (ML) ORAL
Qty: 90 TAB | Refills: 2 | Status: SHIPPED | OUTPATIENT
Start: 2020-01-06

## 2020-01-10 RX ORDER — ASPIRIN 325 MG
50000 TABLET, DELAYED RELEASE (ENTERIC COATED) ORAL
Qty: 4 CAP | Refills: 5 | Status: SHIPPED | OUTPATIENT
Start: 2020-01-16 | End: 2020-11-05 | Stop reason: CLARIF

## 2020-04-03 ENCOUNTER — NURSE TRIAGE (OUTPATIENT)
Dept: OTHER | Facility: CLINIC | Age: 70
End: 2020-04-03

## 2020-04-03 ENCOUNTER — VIRTUAL VISIT (OUTPATIENT)
Dept: INTERNAL MEDICINE CLINIC | Age: 70
End: 2020-04-03

## 2020-04-03 ENCOUNTER — TELEPHONE (OUTPATIENT)
Dept: INTERNAL MEDICINE CLINIC | Age: 70
End: 2020-04-03

## 2020-04-03 DIAGNOSIS — E11.9 TYPE 2 DIABETES MELLITUS WITHOUT COMPLICATION, WITH LONG-TERM CURRENT USE OF INSULIN (HCC): ICD-10-CM

## 2020-04-03 DIAGNOSIS — M06.9 RHEUMATOID ARTHRITIS, INVOLVING UNSPECIFIED SITE, UNSPECIFIED RHEUMATOID FACTOR PRESENCE: ICD-10-CM

## 2020-04-03 DIAGNOSIS — J45.909 ASTHMA, UNSPECIFIED ASTHMA SEVERITY, UNSPECIFIED WHETHER COMPLICATED, UNSPECIFIED WHETHER PERSISTENT: ICD-10-CM

## 2020-04-03 DIAGNOSIS — J20.9 ACUTE BRONCHITIS, UNSPECIFIED ORGANISM: Primary | ICD-10-CM

## 2020-04-03 DIAGNOSIS — J44.9 CHRONIC OBSTRUCTIVE PULMONARY DISEASE, UNSPECIFIED COPD TYPE (HCC): ICD-10-CM

## 2020-04-03 DIAGNOSIS — F41.9 ANXIETY: ICD-10-CM

## 2020-04-03 DIAGNOSIS — Z79.4 TYPE 2 DIABETES MELLITUS WITHOUT COMPLICATION, WITH LONG-TERM CURRENT USE OF INSULIN (HCC): ICD-10-CM

## 2020-04-03 RX ORDER — GUAIFENESIN AND DEXTROMETHORPHAN HYDROBROMIDE 1200; 60 MG/1; MG/1
1 TABLET, EXTENDED RELEASE ORAL 2 TIMES DAILY
Qty: 30 TAB | Refills: 1 | Status: SHIPPED | OUTPATIENT
Start: 2020-04-03

## 2020-04-03 RX ORDER — NEBULIZER AND COMPRESSOR
EACH MISCELLANEOUS
Qty: 1 EACH | Refills: 0 | Status: SHIPPED | OUTPATIENT
Start: 2020-04-03 | End: 2020-04-17 | Stop reason: SDUPTHER

## 2020-04-03 RX ORDER — IPRATROPIUM BROMIDE AND ALBUTEROL SULFATE 2.5; .5 MG/3ML; MG/3ML
SOLUTION RESPIRATORY (INHALATION)
Qty: 1620 ML | Refills: 1 | Status: SHIPPED | OUTPATIENT
Start: 2020-04-03 | End: 2020-04-17 | Stop reason: SDUPTHER

## 2020-04-03 RX ORDER — HYDROXYZINE 25 MG/1
25 TABLET, FILM COATED ORAL
Qty: 30 TAB | Refills: 5 | Status: SHIPPED | OUTPATIENT
Start: 2020-04-03 | End: 2020-04-13

## 2020-04-03 RX ORDER — ONDANSETRON 4 MG/1
4 TABLET, ORALLY DISINTEGRATING ORAL
Qty: 30 TAB | Refills: 1 | Status: SHIPPED | OUTPATIENT
Start: 2020-04-03

## 2020-04-03 RX ORDER — ATORVASTATIN CALCIUM 40 MG/1
40 TABLET, FILM COATED ORAL
Qty: 30 TAB | Refills: 12 | Status: SHIPPED | OUTPATIENT
Start: 2020-04-03

## 2020-04-03 RX ORDER — CALCIUM CARB/VITAMIN D3/VIT K1 500-100-40
TABLET,CHEWABLE ORAL
Qty: 200 SYRINGE | Refills: 0 | Status: SHIPPED | OUTPATIENT
Start: 2020-04-03

## 2020-04-03 RX ORDER — DOXYCYCLINE 100 MG/1
100 CAPSULE ORAL 2 TIMES DAILY
Qty: 14 CAP | Refills: 0 | Status: SHIPPED | OUTPATIENT
Start: 2020-04-03 | End: 2020-04-10

## 2020-04-03 NOTE — TELEPHONE ENCOUNTER
Caller's first and last name:Jaleesa Maguire       Reason for call:pt wants to discuss an anxiety medication. Please call to advise. Callback required yes/no and why:yes       Best contact number(s):[phone]       Details to clarify the request:2019 coronavirus symptoms-cough.  Transferred to nurse at [place] [address]

## 2020-04-03 NOTE — PROGRESS NOTES
Valerie Livingston is a 81 y.o. female evaluated via telephone on 4/3/2020. Consent:  She and/or health care decision maker is aware that that she may receive a bill for this telephone service, depending on her insurance coverage, and has provided verbal consent to proceed: Yes      Documentation:    SUBJECTIVE  Ms. Valerie Livingston presents today acutely for     Chief Complaint   Patient presents with    Depression     pt c.o depression and anxiety - pt states that she lost her friend on 12/2019 that she has been togther for 38 years; pt states she cries everyday    Sore Throat     pt c.o cough with mucous, horseness, and sore throat (pt has garled with salt/water) and using cough drops ; pt do not want any med with codeine    New Order     pt wants an order for a new nebulizer machine    Medication Evaluation     pt wants to disucss her heart medication    Weight Loss     pt want order for ensure    Medication Evaluation     pt wants to discuss brilinta     Cough--productive of yellow mucous. +Hoarseness, sore throat. She has a little bit runny nose. No fever. She awoke with a \"blood clot in my eye. \" Her left eye had a red spot noticed in mirror. No pain. No vision change. She lost her common law , Dec 12, 2019. Still feels depressed, grieving, cries all the time. She took left over alprazolam helped. Everything else I mentioned that isn't a controlled medication she says \"Oh yes, that one made me feel terrible. \"     She is now in pain management with Dr. Maria Ines Palma. \"I have COPD. \"  She hasn't yet been to see Pulmonology. Has a chronic productive cough. She has two inhalers. Takes 2 puffs albuterol. Needs nebulizer    Weight loss: Now on remeron for this. Wt Readings from Last 3 Encounters:   03/18/19 137 lb 12.6 oz (62.5 kg)   03/18/19 138 lb 9.6 oz (62.9 kg)   03/14/19 133 lb (60.3 kg)         DM: On insulin. 90s-low 100s. Needs syringes.      It appears she was admitted over 2 years ago for LLQ abdom pain and BRBPR; CT showed diverticulosis with no diverticulitis. Anxiety: We noted 2019:  \"I be depressed. I don't feel well. I have to make myself do things. \"  She is anxious \"all the time. \"  Trouble sleeping at night. She used to be on benzodiazepines, when she was seeing a psychiatrist \"at the [facility] place\"; office closed down. Dr. Angel Bai showed up for an appointment and they was gone. \"  Now not seeing a psychiatrist.    At this time, she is otherwise doing well and has brought no other complaints to my attention today. For a list of the medical issues addressed today, see the assessment and plan below. PMH:   Past Medical History:   Diagnosis Date    Anxiety     Asthma     COPD     Diabetes ([facility]     Dyspepsia and other specified disorders of function of stomach     Hemorrhoids     Hypertension     Rheumatoid arthritis ([facility] 5/8/2018       Past Surgical History:   Procedure Laterality Date    HX BREAST BIOPSY Left     benign    HX GYN      Thermal Ablation    HX HEART CATHETERIZATION      exploration    HX LAPAROTOMY      HX ORTHOPAEDIC      right knee    HX OTHER SURGICAL      Colonoscopy    HX TUBAL LIGATION         All: is allergic to buspirone; codeine; tylenol-codeine #3 [acetaminophen-codeine]; and zoloft [sertraline]. Current Outpatient Medications   Medication Sig    atorvastatin (LIPITOR) 40 mg tablet Take 1 Tab by mouth nightly.  ondansetron (Zofran ODT) 4 mg disintegrating tablet Take 1 Tab by mouth every six (6) hours as needed for Nausea.  doxycycline (MONODOX) 100 mg capsule Take 1 Cap by mouth two (2) times a day for 7 days.  dextromethorphan-guaiFENesin (Mucinex DM) 60-1,200 mg Tb12 Take 1 Each by mouth two (2) times a day.  cholecalciferol (VITAMIN D3) (50,000 UNITS /1250 MCG) capsule TAKE 1 CAP BY MOUTH EVERY THURSDAY.     aspirin 81 mg chewable tablet TAKE 1 TABLET BY MOUTH EVERY DAY    insulin NPH (NOVOLIN N NPH U-100 INSULIN) 100 unit/mL injection INJECT 20 UNITS SUBCUTANEOUSLY TWICE A DAY    ONETOUCH DELICA PLUS LANCET 33 gauge misc USE AS DIRECTED TWICE A DAY    Blood-Glucose Meter monitoring kit One Touch Ultra Meter. Patient to test Blood Glucose twice per day. Patient use Insulin. Dx E11.9    glucose blood VI test strips (ASCENSIA AUTODISC VI, ONE TOUCH ULTRA TEST VI) strip Check bid as directed    Blood-Glucose Meter (ACCU-CHEK KRISTIE PLUS METER) misc by Does Not Apply route.  glucose blood VI test strips (ASCENSIA AUTODISC VI, ONE TOUCH ULTRA TEST VI) strip one touch ultra 2 strips. Use as directed to check blood sugar twice daily E11.9    nut.tx.gluc.intol,lac-free,soy (GLUCERNA SHAKE) liqd Take 1 Can by mouth two (2) times a day.  Lancing Device with Lancets (ACCU-CHEK SOFT DEV LANCETS) kit by Does Not Apply route.  glucose blood VI test strips (ACCU-CHEK KRISTIE PLUS TEST STRP) strip by Does Not Apply route See Admin Instructions.  metoprolol tartrate (LOPRESSOR) 25 mg tablet Take 1 Tab by mouth two (2) times a day.  gabapentin (NEURONTIN) 300 mg capsule Take 1 Cap by mouth three (3) times daily.  potassium chloride SR (KLOR-CON 10) 10 mEq tablet Take 2 Tabs by mouth two (2) times a day.  ticagrelor (BRILINTA) 90 mg tablet Take 1 Tab by mouth every twelve (12) hours every twelve (12) hours.  albuterol (PROAIR HFA) 90 mcg/actuation inhaler Take 2 Puffs by inhalation every four (4) hours as needed for Shortness of Breath. Usually uses at least once daily.  oxyCODONE (OXYIR) 5 mg capsule Take 1 Cap by mouth every four (4) hours as needed. Max Daily Amount: 30 mg.    albuterol-ipratropium (DUO-NEB) 2.5 mg-0.5 mg/3 ml nebu USE 1 VIAL VIA NEBULIZER EVERY 4 HOURS AS NEEDED    budesonide-formoterol (SYMBICORT) 80-4.5 mcg/actuation HFAA Take 2 Puffs by inhalation two (2) times a day.     BD INSULIN SYRINGE ULTRA-FINE 1 mL 31 gauge x 5/16 syrg USE AS DIRECTED TWICE A DAY    metFORMIN (GLUCOPHAGE) 1,000 mg tablet Take 1,000 mg by mouth two (2) times daily (with meals). Patient unsure of dose or frequency - Rx Query states filled 2/18 #60 for 30 day supply     No current facility-administered medications for this visit. FH: Her family history includes Cancer in her sister; Heart Disease in her father. SH:  reports that she has been smoking. She has a 9.50 pack-year smoking history. She has never used smokeless tobacco. She reports current alcohol use of about 1.7 standard drinks of alcohol per week. She reports that she does not use drugs. ROS: See above; Complete ROS otherwise negative. OBJECTIVE:   Vitals: There were no vitals taken for this visit. No exam.     Lab Results   Component Value Date/Time    Sodium 138 03/18/2019 04:28 PM    Potassium 3.1 (L) 03/18/2019 04:28 PM    Chloride 106 03/18/2019 04:28 PM    CO2 23 03/18/2019 04:28 PM    Anion gap 9 03/18/2019 04:28 PM    Glucose 169 (H) 03/18/2019 04:28 PM    BUN 7 03/18/2019 04:28 PM    Creatinine 0.84 03/18/2019 04:28 PM    BUN/Creatinine ratio 8 (L) 03/18/2019 04:28 PM    GFR est AA >60 03/18/2019 04:28 PM    GFR est non-AA >60 03/18/2019 04:28 PM    Calcium 8.4 (L) 03/18/2019 04:28 PM    Bilirubin, total 0.6 03/14/2019 04:52 PM    ALT (SGPT) 22 03/14/2019 04:52 PM    AST (SGOT) 19 03/14/2019 04:52 PM    Alk. phosphatase 114 03/14/2019 04:52 PM    Protein, total 7.6 03/14/2019 04:52 PM    Albumin 3.6 03/14/2019 04:52 PM    Globulin 4.0 03/14/2019 04:52 PM    A-G Ratio 0.9 (L) 03/14/2019 04:52 PM        Lab Results   Component Value Date/Time    Hemoglobin A1c 6.5 (H) 10/29/2018 12:13 PM       Lab Results   Component Value Date/Time    WBC 7.6 03/18/2019 04:28 PM    HGB 9.7 (L) 03/18/2019 04:28 PM    HCT 28.4 (L) 03/18/2019 04:28 PM    PLATELET 638 75/05/5729 04:28 PM    MCV 96.9 03/18/2019 04:28 PM         ASSESSMENT/ PLAN: Diagnoses and all orders for this visit:    1. Bronchitis: acute. Rx doxycycline and mucinex DM. A  2. Chronic obstructive pulmonary disease, unspecified COPD type [facility]): As far as I can tell, didin't pursue PFT or referral to pulmonology. We'll hold off now in the midst of the pandemic. Rx for nebulizer and supplies to use at home. 3. Type 2 diabetes mellitus without complication, with long-term current use of insulin ([address]): Refill syringes. -     METABOLIC PANEL, COMPREHENSIVE  -     LIPID PANEL  -     HEMOGLOBIN A1C WITH EAG  -     MICROALBUMIN, UR, RAND  4. Chronic pain:She is now in pain clinic. 5. Anxiety and depression: Stable. Refer again to psychiatry. Might try hydroxyzine. 6. Rheumatoid arthritis, involving unspecified site, unspecified rheumatoid factor presence (HCC)  -     SED RATE (ESR)  -     RHEUMATOID FACTOR, QL  -     REFERRAL TO RHEUMATOLOGY       Follow-up and Dispositions    · Return in about 2 months (around 6/3/2020) for follow up anxiety. I have reviewed the patient's medications and risks/side effects/benefits were discussed. Diagnosis(-es) explained to patient and questions answered. Literature provided where appropriate. I affirm this is a Patient Initiated Episode with an Established Patient who has not had a related appointment within my department in the past 7 days or scheduled within the next 24 hours.     Total Time: minutes: 11-20 minutes    Note: not billable if this call serves to triage the patient into an appointment for the relevant concern      Ananda Guardado MD

## 2020-04-03 NOTE — TELEPHONE ENCOUNTER
Identified patient 2 identifiers verified. Patient loss her significant other in December and has increased Depression and Anxiety. Patient had issues with  Trazodone and Cymbalta and stopped  Taking these. Patient placed on schedule for virtual visit.

## 2020-04-03 NOTE — TELEPHONE ENCOUNTER
Reason for Disposition   Symptoms of anxiety or panic and has not been evaluated for this by physician    Protocols used: ANXIETY AND PANIC ATTACK-ADULT-[place]    Caller is reporting anxiety and panic attacks since December. Caller reports the death of a friend around this time. Caller is also scared for her family at this time. Patient does have some medication refill requests at this time as well. Caller would like to speak to PCP.  Thank you  Please advise: [phone]

## 2020-04-03 NOTE — PATIENT INSTRUCTIONS
Office Policies Phone calls/patient messages: Please allow up to 24 hours for someone in the office to contact you about your call or message. Be mindful your provider may be out of the office or your message may require further review. We encourage you to use [employer] for your messages as this is a faster, more efficient way to communicate with our office Medication Refills: 
         
Prescription medications require 48-72 business hours to process. We encourage you to use [employer] for your refills. For controlled medications: Please allow 72 business hours to process. Certain medications may require you to  a written prescription at our office. NO narcotic/controlled medications will be prescribed after 4pm Monday through Friday or on weekends Form/Paperwork Completion: 
         
Please note a $25 fee may incur for all paperwork for completed by our providers. We ask that you allow 7-10 business days. Pre-payment is due prior to picking up/faxing the completed form. You may also download your forms to [employer] to have your doctor print off. 
 
1. Have you been to the ER, urgent care clinic since your last visit? Hospitalized since your last visit?no 2. Have you seen or consulted any other health care providers outside of the [address] [place] [address] since your last visit? Include any pap smears or colon screening.  no

## 2020-04-06 ENCOUNTER — TELEPHONE (OUTPATIENT)
Dept: INTERNAL MEDICINE CLINIC | Age: 70
End: 2020-04-06

## 2020-04-10 ENCOUNTER — TELEPHONE (OUTPATIENT)
Dept: INTERNAL MEDICINE CLINIC | Age: 70
End: 2020-04-10

## 2020-04-16 ENCOUNTER — TELEPHONE (OUTPATIENT)
Dept: INTERNAL MEDICINE CLINIC | Age: 70
End: 2020-04-16

## 2020-04-16 DIAGNOSIS — J44.9 CHRONIC OBSTRUCTIVE PULMONARY DISEASE, UNSPECIFIED COPD TYPE (HCC): ICD-10-CM

## 2020-04-16 DIAGNOSIS — J45.909 ASTHMA, UNSPECIFIED ASTHMA SEVERITY, UNSPECIFIED WHETHER COMPLICATED, UNSPECIFIED WHETHER PERSISTENT: ICD-10-CM

## 2020-04-16 NOTE — TELEPHONE ENCOUNTER
[facility]   Phone Number: [phone]             Caller's first and last name: N/A   Reason for call: Pt would like a call from doctor or nurse to discuss prescription error. Pt received prescription order to  from pharmacy but is confused because she normally has prescriptions mailed to her. Pt would like confirmation that prescription for Nebulizer  will be delivered to home.    Callback required yes/no and why: Yes   Best contact number(s): [phone]   Details to clarify the request: Pt also wanted to discuss receiving face mask for protection during COVID-19     Copy/Paste  eNVERA

## 2020-04-17 RX ORDER — IPRATROPIUM BROMIDE AND ALBUTEROL SULFATE 2.5; .5 MG/3ML; MG/3ML
SOLUTION RESPIRATORY (INHALATION)
Qty: 1620 ML | Refills: 1 | Status: SHIPPED | OUTPATIENT
Start: 2020-04-17 | End: 2020-10-19

## 2020-04-17 RX ORDER — NEBULIZER AND COMPRESSOR
EACH MISCELLANEOUS
Qty: 1 EACH | Refills: 0 | Status: SHIPPED | OUTPATIENT
Start: 2020-04-17

## 2020-04-22 ENCOUNTER — VIRTUAL VISIT (OUTPATIENT)
Dept: INTERNAL MEDICINE CLINIC | Age: 70
End: 2020-04-22

## 2020-04-22 ENCOUNTER — TELEPHONE (OUTPATIENT)
Dept: INTERNAL MEDICINE CLINIC | Age: 70
End: 2020-04-22

## 2020-04-22 DIAGNOSIS — F41.9 ANXIETY: Primary | ICD-10-CM

## 2020-04-22 RX ORDER — VILAZODONE HYDROCHLORIDE 20 MG/1
20 TABLET ORAL DAILY
Qty: 30 TAB | Refills: 5 | Status: SHIPPED | OUTPATIENT
Start: 2020-04-22

## 2020-04-22 NOTE — PATIENT INSTRUCTIONS
Office Policies Phone calls/patient messages: Please allow up to 24 hours for someone in the office to contact you about your call or message. Be mindful your provider may be out of the office or your message may require further review. We encourage you to use [employer] for your messages as this is a faster, more efficient way to communicate with our office Medication Refills: 
         
Prescription medications require 48-72 business hours to process. We encourage you to use [employer] for your refills. For controlled medications: Please allow 72 business hours to process. Certain medications may require you to  a written prescription at our office. NO narcotic/controlled medications will be prescribed after 4pm Monday through Friday or on weekends Form/Paperwork Completion: 
         
Please note a $25 fee may incur for all paperwork for completed by our providers. We ask that you allow 7-10 business days. Pre-payment is due prior to picking up/faxing the completed form. You may also download your forms to [employer] to have your doctor print off. 
 
1. Have you been to the ER, urgent care clinic since your last visit? Hospitalized since your last visit? no 
 
2. Have you seen or consulted any other health care providers outside of the [address] [place] [address] since your last visit? Include any pap smears or colon screening.  no

## 2020-04-22 NOTE — TELEPHONE ENCOUNTER
[facility] Office Pool             Caller's first and last name: Dolphus Bence   Reason for call:   f/up on nebulizer call from earlier.  Has place that she needs it faxed to [phone] required yes/no and why: yes   Best contact number(s):  [phone]   Details to clarify the request:     Copy/Paste ENVERA

## 2020-04-22 NOTE — PROGRESS NOTES
Barbara Calderon is a 81 y.o. female evaluated via telephone on 4/22/2020. Consent:  She and/or health care decision maker is aware that that she may receive a bill for this telephone service, depending on her insurance coverage, and has provided verbal consent to proceed: Yes      Documentation:    SUBJECTIVE  Ms. Barbara Calderon presents today acutely for     Chief Complaint   Patient presents with    Dizziness     pt c.o dizziness for a few days; pt states that she has been stress and depressed - pt states that she did get a letter fr Dr Novoa Forward to contact a psychiatrist    Medication Evaluation     pt states that mirtazapine 15mg at night makes her not able to get up     She sees Dr. Kendrick Mae, for pain management. She thinks he may have prescribed the mirtazepine. She is on mirtazepine now, and \"I can't even get out of bed I'm so groggy. \"     Feels depressed and anxious. We noted at last visit   She lost her common law , Dec 12, 2019. Still feels depressed, grieving, cries all the time. She took left over alprazolam helped. Everything else I mentioned that isn't a controlled medication she says \"Oh yes, that one made me feel terrible. \"     She is eager to get back on valium, \"because it worked for me. \"     She has family members smoking weed around her and that exasperates     OBJECTIVE  No exam.    ASSESSMENT / PLAN    ICD-10-CM ICD-9-CM    1. Anxiety F41.9 300.00 vilazodone (VIIBRYD) 20 mg tab tablet      REFERRAL TO PSYCHIATRY     Stop mirtazepine. She is not sure if she is taking hydroxyzine--reminded her that we prescribed that as an anxiolytic to take as neede. I have reviewed with the patient details of the assessment and plan and all questions were answered. Relevant patient education was performed. Follow-up and Dispositions    · Return in about 2 months (around 6/22/2020) for followup anxieyt.              I affirm this is a Patient Initiated Episode with an Established Patient who has not had a related appointment within my department in the past 7 days or scheduled within the next 24 hours.     Total Time: minutes: 5-10 minutes    Note: not billable if this call serves to triage the patient into an appointment for the relevant concern      Lee Kamara MD

## 2020-04-22 NOTE — TELEPHONE ENCOUNTER
Identified patient 2 identifiers verified. Taking Remeron at bedtime. Patient is complaining of being nervous after waking up in the morning since taking Remeron. Patient reports that she has panic and anxiety attacks. Wants medication evaluation.

## 2020-04-23 NOTE — TELEPHONE ENCOUNTER
Orders were sent to [place] [address] for Nebulizer and nebulizer medications. And nebulizer accessories per verbal read back from Dr. Yolanda Pena.

## 2020-07-12 ENCOUNTER — HOSPITAL ENCOUNTER (EMERGENCY)
Age: 70
Discharge: HOME | End: 2020-07-12
Attending: EMERGENCY MEDICINE
Payer: MEDICARE

## 2020-07-12 ENCOUNTER — APPOINTMENT (OUTPATIENT)
Dept: CT IMAGING | Age: 70
End: 2020-07-12
Attending: EMERGENCY MEDICINE
Payer: MEDICARE

## 2020-07-12 ENCOUNTER — APPOINTMENT (OUTPATIENT)
Dept: GENERAL RADIOLOGY | Age: 70
End: 2020-07-12
Attending: EMERGENCY MEDICINE
Payer: MEDICARE

## 2020-07-12 VITALS
OXYGEN SATURATION: 99 % | TEMPERATURE: 97.3 F | SYSTOLIC BLOOD PRESSURE: 117 MMHG | HEART RATE: 79 BPM | DIASTOLIC BLOOD PRESSURE: 64 MMHG | BODY MASS INDEX: 23.52 KG/M2 | RESPIRATION RATE: 18 BRPM | WEIGHT: 137 LBS

## 2020-07-12 DIAGNOSIS — E86.0 DEHYDRATION: ICD-10-CM

## 2020-07-12 DIAGNOSIS — E16.2 HYPOGLYCEMIA: Primary | ICD-10-CM

## 2020-07-12 DIAGNOSIS — R42 LIGHTHEADEDNESS: ICD-10-CM

## 2020-07-12 LAB
ALBUMIN SERPL-MCNC: 3.6 G/DL (ref 3.5–5)
ALBUMIN/GLOB SERPL: 0.9 {RATIO} (ref 1.1–2.2)
ALP SERPL-CCNC: 97 U/L (ref 45–117)
ALT SERPL-CCNC: 28 U/L (ref 12–78)
ANION GAP SERPL CALC-SCNC: 11 MMOL/L (ref 5–15)
APPEARANCE UR: CLEAR
AST SERPL-CCNC: 25 U/L (ref 15–37)
BACTERIA URNS QL MICRO: NEGATIVE /HPF
BASOPHILS # BLD: 0.1 K/UL (ref 0–0.1)
BASOPHILS NFR BLD: 1 % (ref 0–1)
BILIRUB SERPL-MCNC: 1 MG/DL (ref 0.2–1)
BILIRUB UR QL: NEGATIVE
BUN SERPL-MCNC: 12 MG/DL (ref 6–20)
BUN/CREAT SERPL: 14 (ref 12–20)
CALCIUM SERPL-MCNC: 8.5 MG/DL (ref 8.5–10.1)
CHLORIDE SERPL-SCNC: 107 MMOL/L (ref 97–108)
CO2 SERPL-SCNC: 20 MMOL/L (ref 21–32)
COLOR UR: NORMAL
CREAT SERPL-MCNC: 0.85 MG/DL (ref 0.55–1.02)
DIFFERENTIAL METHOD BLD: ABNORMAL
EOSINOPHIL # BLD: 0.1 K/UL (ref 0–0.4)
EOSINOPHIL NFR BLD: 1 % (ref 0–7)
EPITH CASTS URNS QL MICRO: NORMAL /LPF
ERYTHROCYTE [DISTWIDTH] IN BLOOD BY AUTOMATED COUNT: 11.7 % (ref 11.5–14.5)
GLOBULIN SER CALC-MCNC: 4 G/DL (ref 2–4)
GLUCOSE BLD STRIP.AUTO-MCNC: 261 MG/DL (ref 65–100)
GLUCOSE BLD STRIP.AUTO-MCNC: 67 MG/DL (ref 65–100)
GLUCOSE BLD STRIP.AUTO-MCNC: 75 MG/DL (ref 65–100)
GLUCOSE BLD STRIP.AUTO-MCNC: 90 MG/DL (ref 65–100)
GLUCOSE SERPL-MCNC: 120 MG/DL (ref 65–100)
GLUCOSE UR STRIP.AUTO-MCNC: NEGATIVE MG/DL
HCT VFR BLD AUTO: 43.4 % (ref 35–47)
HGB BLD-MCNC: 14.6 G/DL (ref 11.5–16)
HGB UR QL STRIP: NEGATIVE
HYALINE CASTS URNS QL MICRO: NORMAL /LPF (ref 0–5)
IMM GRANULOCYTES # BLD AUTO: 0 K/UL (ref 0–0.04)
IMM GRANULOCYTES NFR BLD AUTO: 1 % (ref 0–0.5)
KETONES UR QL STRIP.AUTO: NEGATIVE MG/DL
LEUKOCYTE ESTERASE UR QL STRIP.AUTO: NEGATIVE
LYMPHOCYTES # BLD: 0.9 K/UL (ref 0.8–3.5)
LYMPHOCYTES NFR BLD: 17 % (ref 12–49)
MCH RBC QN AUTO: 32.4 PG (ref 26–34)
MCHC RBC AUTO-ENTMCNC: 33.6 G/DL (ref 30–36.5)
MCV RBC AUTO: 96.2 FL (ref 80–99)
MONOCYTES # BLD: 0.4 K/UL (ref 0–1)
MONOCYTES NFR BLD: 7 % (ref 5–13)
NEUTS SEG # BLD: 3.9 K/UL (ref 1.8–8)
NEUTS SEG NFR BLD: 73 % (ref 32–75)
NITRITE UR QL STRIP.AUTO: NEGATIVE
NRBC # BLD: 0 K/UL (ref 0–0.01)
NRBC BLD-RTO: 0 PER 100 WBC
PH UR STRIP: 5 [PH] (ref 5–8)
PLATELET # BLD AUTO: 186 K/UL (ref 150–400)
PMV BLD AUTO: 10.5 FL (ref 8.9–12.9)
POTASSIUM SERPL-SCNC: 3.7 MMOL/L (ref 3.5–5.1)
PROT SERPL-MCNC: 7.6 G/DL (ref 6.4–8.2)
PROT UR STRIP-MCNC: NEGATIVE MG/DL
RBC # BLD AUTO: 4.51 M/UL (ref 3.8–5.2)
RBC #/AREA URNS HPF: NORMAL /HPF (ref 0–5)
SERVICE CMNT-IMP: ABNORMAL
SERVICE CMNT-IMP: NORMAL
SODIUM SERPL-SCNC: 138 MMOL/L (ref 136–145)
SP GR UR REFRACTOMETRY: 1.01 (ref 1–1.03)
TROPONIN I SERPL-MCNC: <0.05 NG/ML
UA: UC IF INDICATED,UAUC: NORMAL
UROBILINOGEN UR QL STRIP.AUTO: 0.2 EU/DL (ref 0.2–1)
WBC # BLD AUTO: 5.4 K/UL (ref 3.6–11)
WBC URNS QL MICRO: NORMAL /HPF (ref 0–4)

## 2020-07-12 PROCEDURE — 70450 CT HEAD/BRAIN W/O DYE: CPT

## 2020-07-12 PROCEDURE — 96360 HYDRATION IV INFUSION INIT: CPT

## 2020-07-12 PROCEDURE — 74011250636 HC RX REV CODE- 250/636: Performed by: EMERGENCY MEDICINE

## 2020-07-12 PROCEDURE — 81001 URINALYSIS AUTO W/SCOPE: CPT

## 2020-07-12 PROCEDURE — 36415 COLL VENOUS BLD VENIPUNCTURE: CPT

## 2020-07-12 PROCEDURE — 99285 EMERGENCY DEPT VISIT HI MDM: CPT

## 2020-07-12 PROCEDURE — 71045 X-RAY EXAM CHEST 1 VIEW: CPT

## 2020-07-12 PROCEDURE — 82962 GLUCOSE BLOOD TEST: CPT

## 2020-07-12 PROCEDURE — 80053 COMPREHEN METABOLIC PANEL: CPT

## 2020-07-12 PROCEDURE — 85025 COMPLETE CBC W/AUTO DIFF WBC: CPT

## 2020-07-12 PROCEDURE — 84484 ASSAY OF TROPONIN QUANT: CPT

## 2020-07-12 RX ADMIN — SODIUM CHLORIDE 500 ML: 900 INJECTION, SOLUTION INTRAVENOUS at 12:04

## 2020-07-12 NOTE — ED PROVIDER NOTES
EMERGENCY DEPARTMENT HISTORY AND PHYSICAL EXAM      Date: 7/12/2020  Patient Name: Nigel Smith    History of Presenting Illness     Chief Complaint   Patient presents with    Dizziness     Presents to the ED via EMS with c/o sudden onset diaphoresis and feeling \"light headed\" x this morning, after eating breakfast and taking her insulin. Temp 93.7 per EMS. Pt alert and interacting appropriately. History Provided By: Patient    HPI: Nigel Smith, 81 y.o. female  presents to the ED with cc of lightheadedness and dizziness. Patient states she woke up this morning feeling lightheaded and dizzy. She was very diaphoretic and broke out in a sweat. She had a little head discomfort but nothing she would call a headache. She had no chest pain or shortness of breath. No nausea vomiting or diarrhea. She did eat some fruit this morning. EMS states her blood sugar was in the 90s. She has had no leg swelling. No fevers or chills. She states she does not eat very much. No urinary symptoms such as dark urine, dysuria, or frequency. Past History     Past Medical History:  Past Medical History:   Diagnosis Date    Anxiety     Asthma     COPD     Diabetes ([address]     Dyspepsia and other specified disorders of function of stomach     Hemorrhoids     Hypertension     Rheumatoid arthritis ([facility] [address] 5/8/2018       Past Surgical History:  Past Surgical History:   Procedure Laterality Date    HX BREAST BIOPSY Left     benign    HX GYN      Thermal Ablation    HX HEART CATHETERIZATION      exploration    HX LAPAROTOMY      HX ORTHOPAEDIC      right knee    HX OTHER SURGICAL      Colonoscopy    HX TUBAL LIGATION         Medications:  No current facility-administered medications on file prior to encounter. Current Outpatient Medications on File Prior to Encounter   Medication Sig Dispense Refill    food supplemt, lactose-reduced (Ensure Original) liqd Take 237 mL by mouth three (3) times daily.  90 Can 3    Nebulizer Accessories kit Nebulizer Accessories Kit. Diagnosis J45.909 COPD J44.9 1 Kit 3    vilazodone (VIIBRYD) 20 mg tab tablet Take 1 Tab by mouth daily. 30 Tab 5    albuterol-ipratropium (DUO-NEB) 2.5 mg-0.5 mg/3 ml nebu USE 1 VIAL VIA NEBULIZER EVERY 4 HOURS AS NEEDED 1620 mL 1    Nebulizer & Compressor machine Use as directed every 4 hours as needed for wheezing. Diagnosis: J45.909, J44.9. Length of need: 99. 1 Each 0    atorvastatin (LIPITOR) 40 mg tablet Take 1 Tab by mouth nightly. 30 Tab 12    ondansetron (Zofran ODT) 4 mg disintegrating tablet Take 1 Tab by mouth every six (6) hours as needed for Nausea. 30 Tab 1    dextromethorphan-guaiFENesin (Mucinex DM) 60-1,200 mg Tb12 Take 1 Each by mouth two (2) times a day. 30 Tab 1    Insulin Syringe-Needle U-100 (BD Insulin Syringe Ultra-Fine) 1 mL 31 gauge x 5/16 syrg USE AS DIRECTED TWICE A  Syringe 0    cholecalciferol (VITAMIN D3) (50,000 UNITS /1250 MCG) capsule TAKE 1 CAP BY MOUTH EVERY THURSDAY. 4 Cap 5    aspirin 81 mg chewable tablet TAKE 1 TABLET BY MOUTH EVERY DAY 90 Tab 2    insulin NPH (NOVOLIN N NPH U-100 INSULIN) 100 unit/mL injection INJECT 20 UNITS SUBCUTANEOUSLY TWICE A DAY 40 mL 2    ONETOUCH DELICA PLUS LANCET 33 gauge misc USE AS DIRECTED TWICE A  Lancet 0    Blood-Glucose Meter monitoring kit One Touch Ultra Meter. Patient to test Blood Glucose twice per day. Patient use Insulin. Dx E11.9 1 Kit 0    glucose blood VI test strips (ASCENSIA AUTODISC VI, ONE TOUCH ULTRA TEST VI) strip Check bid as directed 100 Strip 3    Blood-Glucose Meter (ACCU-CHEK KRISTIE PLUS METER) misc by Does Not Apply route. 1 Each 0    glucose blood VI test strips (ASCENSIA AUTODISC VI, ONE TOUCH ULTRA TEST VI) strip one touch ultra 2 strips. Use as directed to check blood sugar twice daily E11.9 100 Strip 11    nut.tx.gluc.intol,lac-free,soy (GLUCERNA SHAKE) liqd Take 1 Can by mouth two (2) times a day.  60 Can 5    Lancing Device with Lancets (ACCU-CHEK SOFT DEV LANCETS) kit by Does Not Apply route. 1 Kit 11    glucose blood VI test strips (ACCU-CHEK KRISTIE PLUS TEST STRP) strip by Does Not Apply route See Admin Instructions. 100 Strip 3    metoprolol tartrate (LOPRESSOR) 25 mg tablet Take 1 Tab by mouth two (2) times a day. 180 Tab 3    gabapentin (NEURONTIN) 300 mg capsule Take 1 Cap by mouth three (3) times daily. 30 Cap 0    potassium chloride SR (KLOR-CON 10) 10 mEq tablet Take 2 Tabs by mouth two (2) times a day. 30 Tab 0    ticagrelor (BRILINTA) 90 mg tablet Take 1 Tab by mouth every twelve (12) hours every twelve (12) hours. 60 Tab 12    albuterol (PROAIR HFA) 90 mcg/actuation inhaler Take 2 Puffs by inhalation every four (4) hours as needed for Shortness of Breath. Usually uses at least once daily. 1 Inhaler 11    oxyCODONE (OXYIR) 5 mg capsule Take 1 Cap by mouth every four (4) hours as needed. Max Daily Amount: 30 mg. 20 Cap 0    budesonide-formoterol (SYMBICORT) 80-4.5 mcg/actuation HFAA Take 2 Puffs by inhalation two (2) times a day. 1 Inhaler 11    metFORMIN (GLUCOPHAGE) 1,000 mg tablet Take 1,000 mg by mouth two (2) times daily (with meals). Patient unsure of dose or frequency - Rx Query states filled 2/18 #60 for 30 day supply         Family History:  Family History   Problem Relation Age of Onset    Heart Disease Father     Cancer Sister        Social History:  Social History     Tobacco Use    Smoking status: Current Every Day Smoker     Packs/day: 0.25     Years: 38.00     Pack years: 9.50    Smokeless tobacco: Never Used   Substance Use Topics    Alcohol use: Yes     Alcohol/week: 1.7 standard drinks     Types: 2 Glasses of wine per week     Comment: occasionally    Drug use: No       Allergies:   Allergies   Allergen Reactions    Buspirone Other (comments)     jittery    Codeine Itching    Cymbalta [Duloxetine] Other (comments)     Didn't like how it made her feel    Mirtazapine Drowsiness     She has fallen when trying to get up in the morning due to grogginess    Trazodone Other (comments)     Didn't like how it made her feel      Tylenol-Codeine #3 [Acetaminophen-Codeine] Itching     Patient states she cannot tolerate APAP    Wellbutrin [Bupropion Hcl] Other (comments)     Insomnia    Zoloft [Sertraline] Other (comments)     \"Felt crazy\"       All the above components of the past  history are auto-populated from the electronic record. They have been reviewed and the patient has been interviewed for any pertinent past history that pertains to the patient's chief complaint and reason for visit. Not all pre-populated components may be accurate at the time this note was generated. Review of Systems   Review of Systems   Constitutional: Positive for diaphoresis. Negative for chills and fever. HENT: Negative for congestion, ear pain, rhinorrhea, sore throat and trouble swallowing. Eyes: Negative for visual disturbance. Respiratory: Negative for cough, chest tightness and shortness of breath. Cardiovascular: Negative for chest pain and palpitations. Gastrointestinal: Negative for abdominal pain, blood in stool, constipation, diarrhea, nausea and vomiting. Genitourinary: Negative for decreased urine volume, difficulty urinating, dysuria and frequency. Musculoskeletal: Negative for back pain and neck pain. Skin: Negative for color change and rash. Neurological: Positive for light-headedness. Negative for dizziness, weakness and headaches. Physical Exam   Physical Exam  Vitals signs and nursing note reviewed. Constitutional:       General: She is not in acute distress. Appearance: She is well-developed. She is not ill-appearing. Eyes:      Conjunctiva/sclera: Conjunctivae normal.   Neck:      Musculoskeletal: Neck supple. Cardiovascular:      Rate and Rhythm: Normal rate and regular rhythm.    Pulmonary:      Effort: Pulmonary effort is normal. No accessory muscle usage or respiratory distress. Breath sounds: Normal breath sounds. Abdominal:      General: There is no distension. Palpations: Abdomen is soft. Tenderness: There is no abdominal tenderness. Lymphadenopathy:      Cervical: No cervical adenopathy. Skin:     General: Skin is warm and dry. Neurological:      Mental Status: She is alert and oriented to person, place, and time. Cranial Nerves: No cranial nerve deficit. Sensory: No sensory deficit. Diagnostic Study Results     Labs -     Recent Results (from the past 24 hour(s))   GLUCOSE, POC    Collection Time: 07/12/20  9:01 AM   Result Value Ref Range    Glucose (POC) 67 65 - 100 mg/dL    Performed by Pamela Bhardwaj    GLUCOSE, POC    Collection Time: 07/12/20  9:28 AM   Result Value Ref Range    Glucose (POC) 75 65 - 100 mg/dL    Performed by Mima UPTON (PCT)    CBC WITH AUTOMATED DIFF    Collection Time: 07/12/20  9:37 AM   Result Value Ref Range    WBC 5.4 3.6 - 11.0 K/uL    RBC 4.51 3.80 - 5.20 M/uL    HGB 14.6 11.5 - 16.0 g/dL    HCT 43.4 35.0 - 47.0 %    MCV 96.2 80.0 - 99.0 FL    MCH 32.4 26.0 - 34.0 PG    MCHC 33.6 30.0 - 36.5 g/dL    RDW 11.7 11.5 - 14.5 %    PLATELET 776 465 - 761 K/uL    MPV 10.5 8.9 - 12.9 FL    NRBC 0.0 0  WBC    ABSOLUTE NRBC 0.00 0.00 - 0.01 K/uL    NEUTROPHILS 73 32 - 75 %    LYMPHOCYTES 17 12 - 49 %    MONOCYTES 7 5 - 13 %    EOSINOPHILS 1 0 - 7 %    BASOPHILS 1 0 - 1 %    IMMATURE GRANULOCYTES 1 (H) 0.0 - 0.5 %    ABS. NEUTROPHILS 3.9 1.8 - 8.0 K/UL    ABS. LYMPHOCYTES 0.9 0.8 - 3.5 K/UL    ABS. MONOCYTES 0.4 0.0 - 1.0 K/UL    ABS. EOSINOPHILS 0.1 0.0 - 0.4 K/UL    ABS. BASOPHILS 0.1 0.0 - 0.1 K/UL    ABS. IMM.  GRANS. 0.0 0.00 - 0.04 K/UL    DF AUTOMATED     METABOLIC PANEL, COMPREHENSIVE    Collection Time: 07/12/20  9:37 AM   Result Value Ref Range    Sodium 138 136 - 145 mmol/L    Potassium 3.7 3.5 - 5.1 mmol/L    Chloride 107 97 - 108 mmol/L    CO2 20 (L) 21 - 32 mmol/L    Anion gap 11 5 - 15 mmol/L    Glucose 120 (H) 65 - 100 mg/dL    BUN 12 6 - 20 MG/DL    Creatinine 0.85 0.55 - 1.02 MG/DL    BUN/Creatinine ratio 14 12 - 20      GFR est AA >60 >60 ml/min/1.73m2    GFR est non-AA >60 >60 ml/min/1.73m2    Calcium 8.5 8.5 - 10.1 MG/DL    Bilirubin, total 1.0 0.2 - 1.0 MG/DL    ALT (SGPT) 28 12 - 78 U/L    AST (SGOT) 25 15 - 37 U/L    Alk. phosphatase 97 45 - 117 U/L    Protein, total 7.6 6.4 - 8.2 g/dL    Albumin 3.6 3.5 - 5.0 g/dL    Globulin 4.0 2.0 - 4.0 g/dL    A-G Ratio 0.9 (L) 1.1 - 2.2     TROPONIN I    Collection Time: 07/12/20  9:37 AM   Result Value Ref Range    Troponin-I, Qt. <0.05 <0.05 ng/mL   URINALYSIS W/ REFLEX CULTURE    Collection Time: 07/12/20  9:48 AM    Specimen: Urine   Result Value Ref Range    Color YELLOW/STRAW      Appearance CLEAR CLEAR      Specific gravity 1.012 1.003 - 1.030      pH (UA) 5.0 5.0 - 8.0      Protein Negative NEG mg/dL    Glucose Negative NEG mg/dL    Ketone Negative NEG mg/dL    Bilirubin Negative NEG      Blood Negative NEG      Urobilinogen 0.2 0.2 - 1.0 EU/dL    Nitrites Negative NEG      Leukocyte Esterase Negative NEG      WBC 0-4 0 - 4 /hpf    RBC 0-5 0 - 5 /hpf    Epithelial cells FEW FEW /lpf    Bacteria Negative NEG /hpf    UA:UC IF INDICATED CULTURE NOT INDICATED BY UA RESULT CNI      Hyaline cast 0-2 0 - 5 /lpf   GLUCOSE, POC    Collection Time: 07/12/20  9:49 AM   Result Value Ref Range    Glucose (POC) 90 65 - 100 mg/dL    Performed by Claudean Ghee    GLUCOSE, POC    Collection Time: 07/12/20 11:29 AM   Result Value Ref Range    Glucose (POC) 261 (H) 65 - 100 mg/dL    Performed by Giovanny Lee EMT        Radiologic Studies -   CT HEAD WO CONT   Final Result   IMPRESSION:       1. No acute intracranial abnormality. 2. Microvascular ischemic, old ischemic and other age-related change. XR CHEST PORT   Final Result   IMPRESSION:   No acute cardiopulmonary disease radiographically. .  .            CT Results  (Last 48 hours) 07/12/20 1001  CT HEAD WO CONT Final result    Impression:  IMPRESSION:        1. No acute intracranial abnormality. 2. Microvascular ischemic, old ischemic and other age-related change. Narrative:  EXAM: CT HEAD WO CONT       INDICATION: lightheaded       COMPARISON: None. CONTRAST: None. TECHNIQUE: Unenhanced CT of the head was performed using 5 mm images. Brain and   bone windows were generated. Coronal and sagittal reformats. CT dose reduction   was achieved through use of a standardized protocol tailored for this   examination and automatic exposure control for dose modulation. FINDINGS:   Generalized prominence of cerebral sulci and ventricles is mildly increased,   upper normal for age. . Mild periventricular white matter low-density is noted,   extending into the basal ganglia regions, with a focal subcentimeters small low   density in the right basal ganglia region. . There is no intracranial hemorrhage,   extra-axial collection, or mass effect. The basilar cisterns are open. No CT   evidence of acute infarct. The bone windows demonstrate no abnormalities. The visualized portions of the   paranasal sinuses and mastoid air cells are clear. CXR Results  (Last 48 hours)               07/12/20 0921  XR CHEST PORT Final result    Impression:  IMPRESSION:   No acute cardiopulmonary disease radiographically. .  . Narrative:  INDICATION:  cardiac workup        EXAM: Chest single view. COMPARISON: 3/14/2019. FINDINGS: A single frontal view of the chest at 0912 hours shows clear lungs. The heart, mediastinum and pulmonary vasculature are stable . The bony thorax   is unremarkable for age. .                   Medical Decision Making     I reviewed the vital signs, available nursing notes, past medical history, past surgical history, family history and social history. Vital Signs-Reviewed the patient's vital signs.   Patient Vitals for the past 24 hrs:   Temp Pulse Resp BP SpO2   07/12/20 1212  79 18 117/64 99 %   07/12/20 1055 97.3 °F (36.3 °C) 70 19  99 %   07/12/20 0945  70 22 (!) 175/93 100 %   07/12/20 0845 (!) 93.7 °F (34.3 °C) 66  (!) 152/99 100 %         Records Reviewed: Nursing notes for today's visit have been reviewed. I have also reviewed most recent medical records pertinent to today's complaints, if available in our medical record system. I have also reviewed all labs and imaging results from previous results in comparison to results obtained today. If an EKG was obtained today, it has been compared to previous EKGs, if available. If arriving via EMS, the EMS report has been reviewed if made available to us within the patient's time in the emergency department. Provider Notes (Medical Decision Making):   Patient presented for lightheadedness and diaphoresis. She also was hypothermic. Initial glucose was 67 here in the emergency department. The patient began feeling bad after she took her insulin at home. I believe her symptoms are mostly due to hypoglycemia. She is also been having some numbness in her arms and legs but this is been ongoing for months and is currently being seen by her primary care doctor. Patient's blood sugar was corrected here in the emergency department. Her basic chemistries noted that she may be a little bit acidotic therefore I was given her some fluids to help with some dehydration. Glucose level stabilized after oral carbohydrates and a breakfast tray. ED Course:   Initial assessment performed. The patients presenting problems have been discussed, and they are in agreement with the care plan formulated and outlined with them. I have encouraged them to ask questions as they arise throughout their visit.          Orders Placed This Encounter    XR CHEST PORT    CT HEAD WO CONT    CBC WITH AUTOMATED DIFF    METABOLIC PANEL, COMPREHENSIVE    TROPONIN I    URINALYSIS W/ REFLEX CULTURE    B/P LYING/SIT/STANDING    EKG NOTEWRITER(ASAP ONLY)    GLUCOSE, POC    GLUCOSE, POC    GLUCOSE, POC    GLUCOSE, POC    EKG, 12 LEAD, INITIAL    sodium chloride 0.9 % bolus infusion 500 mL       EKG    Date/Time: 7/12/2020 8:41 AM  Performed by: Valentín Hunter MD  Authorized by: Valentín Hunter MD     ECG reviewed by ED Physician in the absence of a cardiologist: yes    Rate:     ECG rate:  62    ECG rate assessment: normal    Rhythm:     Rhythm: sinus rhythm    Ectopy:     Ectopy: none    QRS:     QRS axis:  Normal    QRS intervals:  Normal  Conduction:     Conduction: normal    ST segments:     ST segments:  Non-specific  T waves:     T waves: non-specific            Critical Care Time:   0    Disposition:  Discharge    The patient's emergency department evaluation is now complete. I have reviewed all labs, imaging, and pertinent information. I have discussed all results with the patient and/or family. Based on our evaluation today I do believe that the patient is safe to be discharged home. The patient has been provided with at home instructions that are pertinent to their complaint today, although these may not be specific to the exact diagnosis. I have reviewed the patient's home medications and attempted to reconcile if not already done so by pharmacy or nursing staff. I have discussed all new prescriptions with the patient. The patient has been encouraged to follow-up with primary care doctor and/or specialist, and these have been discussed with the patient. The patient has been advised that they may return to the emergency department if they have any worsening symptoms and or new symptoms that are of concern to them. Verbal discharge instructions may have also been provided to the patient that may not be specifically contained in the written discharge instructions. The patient has been given opportunity to ask questions prior to discharge. PLAN:  1.    Current Discharge Medication List        2. Follow-up Information     Follow up With Specialties Details Why Contact Safia Cifuentes MD Internal Medicine Schedule an appointment as soon as possible for a visit   [address] [facility]  [phone]          Return to ED if worse     Diagnosis     Clinical Impression:   1. Hypoglycemia    2. Lightheadedness    3. Dehydration            This note will not be viewable in MyChart.

## 2020-07-12 NOTE — DISCHARGE INSTRUCTIONS
Thank you for visiting our emergency department today. We all do hope that we were able to assist you in your emergent needs today. Please read over your discharge instructions as these contain pertinent information to help you in the healing process. These instructions include a list of prescriptions you were given today. Follow-up information is also noted on your discharge papers. There are attached instructions and information pertaining to the reason why you were seen in the emergency department today. These discharge instructions may not be for exactly why you were here, but may be the closest available instructions that we have. These include important advice for things that you can do at home to feel better, and reasons to return to the emergency department. The evaluation and treatment you received in the emergency department is not always definitive care. If follow-up with your primary care doctor or specialist was recommended, it is important that you make these appointments for follow-up care. You may need further testing, procedures, and/or medications to help you feel better. Further tests may be required that are not available in the emergency department. Failure to make these follow-up appointments may jeopardize your health. The emergency department is here for emergent stabilization and evaluation of life and limb threatening illness and/or injuries. Further care through a specialist or primary care doctor may be required to assist in your healing and complete your treatment and/or evaluation. We may not always be able to make a diagnosis in the emergency department, or things may change that will alter your diagnosis. Our primary goal is to ensure that nothing serious is occurring and that you are stable to continue your treatment and evaluation at home as an outpatient.   Of course, if things change, and you feel worse, you are always encouraged to return to the emergency department for re-evaluation. Lab Results Today:  Recent Results (from the past 8 hour(s))   GLUCOSE, POC    Collection Time: 07/12/20  9:01 AM   Result Value Ref Range    Glucose (POC) 67 65 - 100 mg/dL    Performed by Capo Oglesby    GLUCOSE, POC    Collection Time: 07/12/20  9:28 AM   Result Value Ref Range    Glucose (POC) 75 65 - 100 mg/dL    Performed by Manny UPTON (PCT)    CBC WITH AUTOMATED DIFF    Collection Time: 07/12/20  9:37 AM   Result Value Ref Range    WBC 5.4 3.6 - 11.0 K/uL    RBC 4.51 3.80 - 5.20 M/uL    HGB 14.6 11.5 - 16.0 g/dL    HCT 43.4 35.0 - 47.0 %    MCV 96.2 80.0 - 99.0 FL    MCH 32.4 26.0 - 34.0 PG    MCHC 33.6 30.0 - 36.5 g/dL    RDW 11.7 11.5 - 14.5 %    PLATELET 193 338 - 758 K/uL    MPV 10.5 8.9 - 12.9 FL    NRBC 0.0 0  WBC    ABSOLUTE NRBC 0.00 0.00 - 0.01 K/uL    NEUTROPHILS 73 32 - 75 %    LYMPHOCYTES 17 12 - 49 %    MONOCYTES 7 5 - 13 %    EOSINOPHILS 1 0 - 7 %    BASOPHILS 1 0 - 1 %    IMMATURE GRANULOCYTES 1 (H) 0.0 - 0.5 %    ABS. NEUTROPHILS 3.9 1.8 - 8.0 K/UL    ABS. LYMPHOCYTES 0.9 0.8 - 3.5 K/UL    ABS. MONOCYTES 0.4 0.0 - 1.0 K/UL    ABS. EOSINOPHILS 0.1 0.0 - 0.4 K/UL    ABS. BASOPHILS 0.1 0.0 - 0.1 K/UL    ABS. IMM. GRANS. 0.0 0.00 - 0.04 K/UL    DF AUTOMATED     METABOLIC PANEL, COMPREHENSIVE    Collection Time: 07/12/20  9:37 AM   Result Value Ref Range    Sodium 138 136 - 145 mmol/L    Potassium 3.7 3.5 - 5.1 mmol/L    Chloride 107 97 - 108 mmol/L    CO2 20 (L) 21 - 32 mmol/L    Anion gap 11 5 - 15 mmol/L    Glucose 120 (H) 65 - 100 mg/dL    BUN 12 6 - 20 MG/DL    Creatinine 0.85 0.55 - 1.02 MG/DL    BUN/Creatinine ratio 14 12 - 20      GFR est AA >60 >60 ml/min/1.73m2    GFR est non-AA >60 >60 ml/min/1.73m2    Calcium 8.5 8.5 - 10.1 MG/DL    Bilirubin, total 1.0 0.2 - 1.0 MG/DL    ALT (SGPT) 28 12 - 78 U/L    AST (SGOT) 25 15 - 37 U/L    Alk.  phosphatase 97 45 - 117 U/L    Protein, total 7.6 6.4 - 8.2 g/dL    Albumin 3.6 3.5 - 5.0 g/dL    Globulin 4.0 2.0 - 4.0 g/dL    A-G Ratio 0.9 (L) 1.1 - 2.2     TROPONIN I    Collection Time: 07/12/20  9:37 AM   Result Value Ref Range    Troponin-I, Qt. <0.05 <0.05 ng/mL   URINALYSIS W/ REFLEX CULTURE    Collection Time: 07/12/20  9:48 AM    Specimen: Urine   Result Value Ref Range    Color YELLOW/STRAW      Appearance CLEAR CLEAR      Specific gravity 1.012 1.003 - 1.030      pH (UA) 5.0 5.0 - 8.0      Protein Negative NEG mg/dL    Glucose Negative NEG mg/dL    Ketone Negative NEG mg/dL    Bilirubin Negative NEG      Blood Negative NEG      Urobilinogen 0.2 0.2 - 1.0 EU/dL    Nitrites Negative NEG      Leukocyte Esterase Negative NEG      WBC 0-4 0 - 4 /hpf    RBC 0-5 0 - 5 /hpf    Epithelial cells FEW FEW /lpf    Bacteria Negative NEG /hpf    UA:UC IF INDICATED CULTURE NOT INDICATED BY UA RESULT CNI      Hyaline cast 0-2 0 - 5 /lpf   GLUCOSE, POC    Collection Time: 07/12/20  9:49 AM   Result Value Ref Range    Glucose (POC) 90 65 - 100 mg/dL    Performed by Ryan Dominguez    GLUCOSE, POC    Collection Time: 07/12/20 11:29 AM   Result Value Ref Range    Glucose (POC) 261 (H) 65 - 100 mg/dL    Performed by Erick Lee EMT         Radiology Results Today:  Ct Head Wo Cont    Result Date: 7/12/2020  IMPRESSION: 1. No acute intracranial abnormality. 2. Microvascular ischemic, old ischemic and other age-related change. Xr Chest Port    Result Date: 7/12/2020  IMPRESSION: No acute cardiopulmonary disease radiographically. .  .

## 2020-07-12 NOTE — ED NOTES
0901: BG<80 - 4 ounces of juice given    0928: BG <80 - 4 ounces of juice given    0950: BG > 80 - meal provided

## 2020-07-16 ENCOUNTER — VIRTUAL VISIT (OUTPATIENT)
Dept: INTERNAL MEDICINE CLINIC | Age: 70
End: 2020-07-16

## 2020-07-30 ENCOUNTER — HOSPITAL ENCOUNTER (EMERGENCY)
Age: 70
End: 2020-07-30

## 2020-07-30 ENCOUNTER — HOSPITAL ENCOUNTER (EMERGENCY)
Age: 70
Discharge: HOME | End: 2020-07-30
Attending: EMERGENCY MEDICINE
Payer: MEDICARE

## 2020-07-30 VITALS
TEMPERATURE: 98.1 F | SYSTOLIC BLOOD PRESSURE: 130 MMHG | DIASTOLIC BLOOD PRESSURE: 80 MMHG | HEART RATE: 80 BPM | HEIGHT: 65 IN | BODY MASS INDEX: 23.95 KG/M2 | RESPIRATION RATE: 18 BRPM | WEIGHT: 143.74 LBS | OXYGEN SATURATION: 100 %

## 2020-07-30 DIAGNOSIS — E16.2 HYPOGLYCEMIA: Primary | ICD-10-CM

## 2020-07-30 DIAGNOSIS — E87.6 HYPOKALEMIA: ICD-10-CM

## 2020-07-30 LAB
ALBUMIN SERPL-MCNC: 3.8 G/DL (ref 3.5–5)
ALBUMIN/GLOB SERPL: 0.8 {RATIO} (ref 1.1–2.2)
ALP SERPL-CCNC: 107 U/L (ref 45–117)
ALT SERPL-CCNC: 33 U/L (ref 12–78)
ANION GAP SERPL CALC-SCNC: 6 MMOL/L (ref 5–15)
AST SERPL-CCNC: 46 U/L (ref 15–37)
BASOPHILS # BLD: 0 K/UL (ref 0–0.1)
BASOPHILS NFR BLD: 0 % (ref 0–1)
BILIRUB SERPL-MCNC: 1.2 MG/DL (ref 0.2–1)
BUN SERPL-MCNC: 8 MG/DL (ref 6–20)
BUN/CREAT SERPL: 10 (ref 12–20)
CALCIUM SERPL-MCNC: 9.2 MG/DL (ref 8.5–10.1)
CHLORIDE SERPL-SCNC: 101 MMOL/L (ref 97–108)
CO2 SERPL-SCNC: 31 MMOL/L (ref 21–32)
CREAT SERPL-MCNC: 0.78 MG/DL (ref 0.55–1.02)
DIFFERENTIAL METHOD BLD: ABNORMAL
EOSINOPHIL # BLD: 0 K/UL (ref 0–0.4)
EOSINOPHIL NFR BLD: 0 % (ref 0–7)
ERYTHROCYTE [DISTWIDTH] IN BLOOD BY AUTOMATED COUNT: 12 % (ref 11.5–14.5)
GLOBULIN SER CALC-MCNC: 4.5 G/DL (ref 2–4)
GLUCOSE BLD STRIP.AUTO-MCNC: 98 MG/DL (ref 65–100)
GLUCOSE SERPL-MCNC: 123 MG/DL (ref 65–100)
HCT VFR BLD AUTO: 51.7 % (ref 35–47)
HGB BLD-MCNC: 17.3 G/DL (ref 11.5–16)
IMM GRANULOCYTES # BLD AUTO: 0.1 K/UL (ref 0–0.04)
IMM GRANULOCYTES NFR BLD AUTO: 1 % (ref 0–0.5)
LYMPHOCYTES # BLD: 0.8 K/UL (ref 0.8–3.5)
LYMPHOCYTES NFR BLD: 7 % (ref 12–49)
MAGNESIUM SERPL-MCNC: 1.6 MG/DL (ref 1.6–2.4)
MCH RBC QN AUTO: 32.4 PG (ref 26–34)
MCHC RBC AUTO-ENTMCNC: 33.5 G/DL (ref 30–36.5)
MCV RBC AUTO: 96.8 FL (ref 80–99)
MONOCYTES # BLD: 0.6 K/UL (ref 0–1)
MONOCYTES NFR BLD: 5 % (ref 5–13)
NEUTS SEG # BLD: 10 K/UL (ref 1.8–8)
NEUTS SEG NFR BLD: 87 % (ref 32–75)
NRBC # BLD: 0 K/UL (ref 0–0.01)
NRBC BLD-RTO: 0 PER 100 WBC
PLATELET # BLD AUTO: 234 K/UL (ref 150–400)
PMV BLD AUTO: 11 FL (ref 8.9–12.9)
POTASSIUM SERPL-SCNC: 3.1 MMOL/L (ref 3.5–5.1)
PROT SERPL-MCNC: 8.3 G/DL (ref 6.4–8.2)
RBC # BLD AUTO: 5.34 M/UL (ref 3.8–5.2)
RBC MORPH BLD: ABNORMAL
SERVICE CMNT-IMP: NORMAL
SODIUM SERPL-SCNC: 138 MMOL/L (ref 136–145)
WBC # BLD AUTO: 11.5 K/UL (ref 3.6–11)

## 2020-07-30 PROCEDURE — 93005 ELECTROCARDIOGRAM TRACING: CPT

## 2020-07-30 PROCEDURE — 80053 COMPREHEN METABOLIC PANEL: CPT

## 2020-07-30 PROCEDURE — 82962 GLUCOSE BLOOD TEST: CPT

## 2020-07-30 PROCEDURE — 85025 COMPLETE CBC W/AUTO DIFF WBC: CPT

## 2020-07-30 PROCEDURE — 36415 COLL VENOUS BLD VENIPUNCTURE: CPT

## 2020-07-30 PROCEDURE — 83735 ASSAY OF MAGNESIUM: CPT

## 2020-07-30 PROCEDURE — 99285 EMERGENCY DEPT VISIT HI MDM: CPT

## 2020-07-30 PROCEDURE — 74011250637 HC RX REV CODE- 250/637: Performed by: EMERGENCY MEDICINE

## 2020-07-30 RX ORDER — POTASSIUM CHLORIDE 750 MG/1
10 TABLET, FILM COATED, EXTENDED RELEASE ORAL DAILY
Qty: 7 TAB | Refills: 0 | Status: SHIPPED | OUTPATIENT
Start: 2020-07-30

## 2020-07-30 RX ORDER — POTASSIUM CHLORIDE 750 MG/1
40 TABLET, FILM COATED, EXTENDED RELEASE ORAL
Status: COMPLETED | OUTPATIENT
Start: 2020-07-30 | End: 2020-07-30

## 2020-07-30 RX ADMIN — POTASSIUM CHLORIDE 40 MEQ: 750 TABLET, FILM COATED, EXTENDED RELEASE ORAL at 16:53

## 2020-07-30 NOTE — DISCHARGE INSTRUCTIONS
Patient Education        Hypoglycemia: Care Instructions  Your Care Instructions     Hypoglycemia means that your blood sugar is low and your body is not getting enough fuel. Some people get low blood sugar from not eating often enough. Some medicines to treat diabetes can cause low blood sugar. People who have had surgery on their stomachs or intestines may get hypoglycemia. Problems with the pancreas, kidneys, or liver also can cause low blood sugar. A snack or drink with sugar in it will raise your blood sugar and should ease your symptoms right away. Your doctor may recommend that you change or stop your medicines until you can get your blood sugar levels under control. In the long run, you may need to change your diet and eating habits so that you get enough fuel for your body throughout the day. Follow-up care is a key part of your treatment and safety. Be sure to make and go to all appointments, and call your doctor if you are having problems. It's also a good idea to know your test results and keep a list of the medicines you take. How can you care for yourself at home? · Learn to recognize the early signs of low blood sugar. Signs include:  ? Nausea. ? Hunger. ? Feeling nervous, irritable, or shaky. ? Cold, clammy, wet skin. ? Sweating (when you are not exercising). ? A fast heartbeat.  ? Numbness or tingling of the fingertips or lips. · If you feel an episode of low blood sugar coming on, eat or drink a quick-sugar food. Some examples of quick-sugar foods are glucose tablets, table sugar, hard candy (such as Life Savers), fruit juice, and regular (not diet) soda. · Eat small, frequent meals so that you do not get too hungry between meals. · Balance extra exercise with eating more. · Keep a written record of your low blood sugar episodes, including when you last ate and what you ate, so that you can learn what causes your blood sugar to drop.   · Make sure your family, friends, and coworkers know the symptoms of low blood sugar and know what to do to get your sugar level up. · Wear medical alert jewelry that lists your condition. You can buy this at most drugstores. When should you call for help? [id number] anytime you think you may need emergency care. For example, call if:  · You passed out (lost consciousness). · You are confused or cannot think clearly. · Your blood sugar is very high or very low. Watch closely for changes in your health, and be sure to contact your doctor if:  · Your blood sugar stays outside the level your doctor set for you. · You have any problems. Where can you learn more? Go to http://www.flood.com/  Enter R955 in the search box to learn more about \"Hypoglycemia: Care Instructions. \"  Current as of: December 20, 2019               Content Version: 12.5  © 7892-4648 [employer]. Care instructions adapted under license by [employer] (which disclaims liability or warranty for this information). If you have questions about a medical condition or this instruction, always ask your healthcare professional. [address] any warranty or liability for your use of this information. Patient Education        Hypokalemia: Care Instructions  Your Care Instructions     Hypokalemia (say \"cq-df-jxg-ESTEPHANIA-cherie-uh\") is a low level of potassium. The heart, muscles, kidneys, and nervous system all need potassium to work well. This problem has many different causes. Kidney problems, diet, and medicines like diuretics and laxatives can cause it. So can vomiting or diarrhea. In some cases, cancer is the cause. Your doctor may do tests to find the cause of your low potassium levels. You may need medicines to bring your potassium levels back to normal. You may also need regular blood tests to check your potassium. If you have very low potassium, you may need intravenous (IV) medicines.  You also may need tests to check the electrical activity of your heart. Heart problems caused by low potassium levels can be very serious. Follow-up care is a key part of your treatment and safety. Be sure to make and go to all appointments, and call your doctor if you are having problems. It's also a good idea to know your test results and keep a list of the medicines you take. How can you care for yourself at home? · If your doctor recommends it, eat foods that have a lot of potassium. These include fresh fruits, juices, and vegetables. They also include nuts, beans, and milk. · Be safe with medicines. If your doctor prescribes medicines or potassium supplements, take them exactly as directed. Call your doctor if you have any problems with your medicines. · Get your potassium levels tested as often as your doctor tells you. When should you call for help? [id number] anytime you think you may need emergency care. For example, call if:  · You feel like your heart is missing beats. Heart problems caused by low potassium can cause death. · You passed out (lost consciousness). · You have a seizure. Call your doctor now or seek immediate medical care if:  · You feel weak or unusually tired. · You have severe arm or leg cramps. · You have tingling or numbness. · You feel sick to your stomach, or you vomit. · You have belly cramps. · You feel bloated or constipated. · You have to urinate a lot. · You feel very thirsty most of the time. · You are dizzy or lightheaded, or you feel like you may faint. · You feel depressed, or you lose touch with reality. Watch closely for changes in your health, and be sure to contact your doctor if:  · You do not get better as expected. Where can you learn more? Go to http://shakir-ros.info/  Enter G358 in the search box to learn more about \"Hypokalemia: Care Instructions. \"  Current as of: July 29, 2019               Content Version: 12.5  © 6027-0456 [employer]. Care instructions adapted under license by [employer] (which disclaims liability or warranty for this information). If you have questions about a medical condition or this instruction, always ask your healthcare professional. [address] any warranty or liability for your use of this information.

## 2020-07-30 NOTE — ED PROVIDER NOTES
EMERGENCY DEPARTMENT HISTORY AND PHYSICAL EXAM      Date: 7/30/2020  Patient Name: Imelda Farah    History of Presenting Illness     Chief Complaint   Patient presents with    Altered mental status     pt from home for ams. bs upon ems arrival was 47 and D10 was administered with a repeat bs of 115. pt reported that she took her insulin but didn't eat. pt alert and oriented, gcs 15. History Provided By: Patient and EMS    HPI: Imelda Farah, 78 y.o. female presents to the ED with cc of hypoglycemia/altered mental status. Patient was brought in by EMS. According to them, her glucose was 47 upon arrival.  She received D10 via EMS. Repete blood sugar was 115. Patient states that she took her insulin but had not eaten. She denies any recent illness other than dehydration. She states she was seen in ER last week for that problem. She denies nausea, vomiting, diarrhea, chest pain, cough, fever, chills or shortness of breath. She also denies abdominal pain or headache. There are no other complaints, changes, or physical findings at this time. PCP: Marivel Haines MD    No current facility-administered medications on file prior to encounter. Current Outpatient Medications on File Prior to Encounter   Medication Sig Dispense Refill    food supplemt, lactose-reduced (Ensure Original) liqd Take 237 mL by mouth three (3) times daily. 90 Can 3    Nebulizer Accessories kit Nebulizer Accessories Kit. Diagnosis J45.909 COPD J44.9 1 Kit 3    vilazodone (VIIBRYD) 20 mg tab tablet Take 1 Tab by mouth daily. 30 Tab 5    albuterol-ipratropium (DUO-NEB) 2.5 mg-0.5 mg/3 ml nebu USE 1 VIAL VIA NEBULIZER EVERY 4 HOURS AS NEEDED 1620 mL 1    Nebulizer & Compressor machine Use as directed every 4 hours as needed for wheezing. Diagnosis: J45.909, J44.9. Length of need: 99. 1 Each 0    atorvastatin (LIPITOR) 40 mg tablet Take 1 Tab by mouth nightly.  30 Tab 12    ondansetron (Zofran ODT) 4 mg disintegrating tablet Take 1 Tab by mouth every six (6) hours as needed for Nausea. 30 Tab 1    dextromethorphan-guaiFENesin (Mucinex DM) 60-1,200 mg Tb12 Take 1 Each by mouth two (2) times a day. 30 Tab 1    Insulin Syringe-Needle U-100 (BD Insulin Syringe Ultra-Fine) 1 mL 31 gauge x 5/16 syrg USE AS DIRECTED TWICE A  Syringe 0    cholecalciferol (VITAMIN D3) (50,000 UNITS /1250 MCG) capsule TAKE 1 CAP BY MOUTH EVERY THURSDAY. 4 Cap 5    aspirin 81 mg chewable tablet TAKE 1 TABLET BY MOUTH EVERY DAY 90 Tab 2    insulin NPH (NOVOLIN N NPH U-100 INSULIN) 100 unit/mL injection INJECT 20 UNITS SUBCUTANEOUSLY TWICE A DAY 40 mL 2    ONETOUCH DELICA PLUS LANCET 33 gauge misc USE AS DIRECTED TWICE A  Lancet 0    Blood-Glucose Meter monitoring kit One Touch Ultra Meter. Patient to test Blood Glucose twice per day. Patient use Insulin. Dx E11.9 1 Kit 0    glucose blood VI test strips (ASCENSIA AUTODISC VI, ONE TOUCH ULTRA TEST VI) strip Check bid as directed 100 Strip 3    Blood-Glucose Meter (ACCU-CHEK KRISTIE PLUS METER) misc by Does Not Apply route. 1 Each 0    glucose blood VI test strips (ASCENSIA AUTODISC VI, ONE TOUCH ULTRA TEST VI) strip one touch ultra 2 strips. Use as directed to check blood sugar twice daily E11.9 100 Strip 11    nut.tx.gluc.intol,lac-free,soy (GLUCERNA SHAKE) liqd Take 1 Can by mouth two (2) times a day. 60 Can 5    Lancing Device with Lancets (ACCU-CHEK SOFT DEV LANCETS) kit by Does Not Apply route. 1 Kit 11    glucose blood VI test strips (ACCU-CHEK KRISTIE PLUS TEST STRP) strip by Does Not Apply route See Admin Instructions. 100 Strip 3    metoprolol tartrate (LOPRESSOR) 25 mg tablet Take 1 Tab by mouth two (2) times a day. 180 Tab 3    gabapentin (NEURONTIN) 300 mg capsule Take 1 Cap by mouth three (3) times daily. 30 Cap 0    potassium chloride SR (KLOR-CON 10) 10 mEq tablet Take 2 Tabs by mouth two (2) times a day.  30 Tab 0    ticagrelor (BRILINTA) 90 mg tablet Take 1 Tab by mouth every twelve (12) hours every twelve (12) hours. 60 Tab 12    albuterol (PROAIR HFA) 90 mcg/actuation inhaler Take 2 Puffs by inhalation every four (4) hours as needed for Shortness of Breath. Usually uses at least once daily. 1 Inhaler 11    oxyCODONE (OXYIR) 5 mg capsule Take 1 Cap by mouth every four (4) hours as needed. Max Daily Amount: 30 mg. 20 Cap 0    budesonide-formoterol (SYMBICORT) 80-4.5 mcg/actuation HFAA Take 2 Puffs by inhalation two (2) times a day. 1 Inhaler 11    metFORMIN (GLUCOPHAGE) 1,000 mg tablet Take 1,000 mg by mouth two (2) times daily (with meals). Patient unsure of dose or frequency - Rx Query states filled 2/18 #60 for 30 day supply         Past History     Past Medical History:  Past Medical History:   Diagnosis Date    Anxiety     Asthma     COPD     Diabetes ([facility] [address]     Dyspepsia and other specified disorders of function of stomach     Hemorrhoids     Hypertension     Rheumatoid arthritis ([facility] [address] 5/8/2018       Past Surgical History:  Past Surgical History:   Procedure Laterality Date    HX BREAST BIOPSY Left     benign    HX GYN      Thermal Ablation    HX HEART CATHETERIZATION      exploration    HX LAPAROTOMY      HX ORTHOPAEDIC      right knee    HX OTHER SURGICAL      Colonoscopy    HX TUBAL LIGATION         Family History:  Family History   Problem Relation Age of Onset    Heart Disease Father     Cancer Sister        Social History:  Social History     Tobacco Use    Smoking status: Current Every Day Smoker     Packs/day: 0.25     Years: 38.00     Pack years: 9.50    Smokeless tobacco: Never Used   Substance Use Topics    Alcohol use: Yes     Alcohol/week: 1.7 standard drinks     Types: 2 Glasses of wine per week     Comment: occasionally    Drug use: No       Allergies:   Allergies   Allergen Reactions    Buspirone Other (comments)     jittery    Codeine Itching    Cymbalta [Duloxetine] Other (comments)     Didn't like how it made her feel    Mirtazapine Drowsiness     She has fallen when trying to get up in the morning due to grogginess    Trazodone Other (comments)     Didn't like how it made her feel      Tylenol-Codeine #3 [Acetaminophen-Codeine] Itching     Patient states she cannot tolerate APAP    Wellbutrin [Bupropion Hcl] Other (comments)     Insomnia    Zoloft [Sertraline] Other (comments)     \"Felt crazy\"         Review of Systems   Review of Systems   Constitutional: Negative for fever. HENT: Negative for congestion. Eyes: Negative. Cardiovascular: Negative for chest pain. Gastrointestinal: Negative for abdominal pain. Endocrine: Negative for heat intolerance. Genitourinary: Negative for dysuria. Musculoskeletal: Negative for back pain. Skin: Negative for rash. Allergic/Immunologic: Negative for immunocompromised state. Neurological: Negative for dizziness. Hematological: Does not bruise/bleed easily. Psychiatric/Behavioral: Negative. All other systems reviewed and are negative. Physical Exam   Physical Exam  Vitals signs and nursing note reviewed. Constitutional:       General: She is not in acute distress. Appearance: She is well-developed. HENT:      Head: Normocephalic and atraumatic. Eyes:      Pupils: Pupils are equal, round, and reactive to light. Neck:      Musculoskeletal: Normal range of motion. Cardiovascular:      Rate and Rhythm: Normal rate and regular rhythm. Heart sounds: Normal heart sounds. Pulmonary:      Effort: Pulmonary effort is normal. No respiratory distress. Breath sounds: Normal breath sounds. Abdominal:      General: Bowel sounds are normal.      Palpations: Abdomen is soft. There is no mass. Tenderness: There is no abdominal tenderness. Musculoskeletal: Normal range of motion. Skin:     General: Skin is warm and dry. Neurological:      Mental Status: She is alert and oriented to person, place, and time.       Coordination: Coordination normal.   Psychiatric:         Mood and Affect: Mood normal.         Behavior: Behavior normal.         Diagnostic Study Results     Labs -     Recent Results (from the past 12 hour(s))   GLUCOSE, POC    Collection Time: 07/30/20  1:20 PM   Result Value Ref Range    Glucose (POC) 98 65 - 100 mg/dL    Performed by Crystal Cosme (traveller)    EKG, 12 LEAD, INITIAL    Collection Time: 07/30/20  1:28 PM   Result Value Ref Range    Ventricular Rate 83 BPM    Atrial Rate 83 BPM    P-R Interval 188 ms    QRS Duration 86 ms    Q-T Interval 422 ms    QTC Calculation (Bezet) 495 ms    Calculated P Axis 58 degrees    Calculated R Axis 63 degrees    Calculated T Axis 56 degrees    Diagnosis       Normal sinus rhythm  Nonspecific ST abnormality  Prolonged QT  When compared with ECG of 12-JUL-2020 08:41,  Nonspecific T wave abnormality no longer evident in Lateral leads     CBC WITH AUTOMATED DIFF    Collection Time: 07/30/20  2:11 PM   Result Value Ref Range    WBC 11.5 (H) 3.6 - 11.0 K/uL    RBC 5.34 (H) 3.80 - 5.20 M/uL    HGB 17.3 (H) 11.5 - 16.0 g/dL    HCT 51.7 (H) 35.0 - 47.0 %    MCV 96.8 80.0 - 99.0 FL    MCH 32.4 26.0 - 34.0 PG    MCHC 33.5 30.0 - 36.5 g/dL    RDW 12.0 11.5 - 14.5 %    PLATELET 300 494 - 229 K/uL    MPV 11.0 8.9 - 12.9 FL    NRBC 0.0 0  WBC    ABSOLUTE NRBC 0.00 0.00 - 0.01 K/uL    NEUTROPHILS 87 (H) 32 - 75 %    LYMPHOCYTES 7 (L) 12 - 49 %    MONOCYTES 5 5 - 13 %    EOSINOPHILS 0 0 - 7 %    BASOPHILS 0 0 - 1 %    IMMATURE GRANULOCYTES 1 (H) 0.0 - 0.5 %    ABS. NEUTROPHILS 10.0 (H) 1.8 - 8.0 K/UL    ABS. LYMPHOCYTES 0.8 0.8 - 3.5 K/UL    ABS. MONOCYTES 0.6 0.0 - 1.0 K/UL    ABS. EOSINOPHILS 0.0 0.0 - 0.4 K/UL    ABS. BASOPHILS 0.0 0.0 - 0.1 K/UL    ABS. IMM.  GRANS. 0.1 (H) 0.00 - 0.04 K/UL    DF SMEAR SCANNED      RBC COMMENTS NORMOCYTIC, NORMOCHROMIC     METABOLIC PANEL, COMPREHENSIVE    Collection Time: 07/30/20  3:13 PM   Result Value Ref Range    Sodium 138 136 - 145 mmol/L Potassium 3.1 (L) 3.5 - 5.1 mmol/L    Chloride 101 97 - 108 mmol/L    CO2 31 21 - 32 mmol/L    Anion gap 6 5 - 15 mmol/L    Glucose 123 (H) 65 - 100 mg/dL    BUN 8 6 - 20 MG/DL    Creatinine 0.78 0.55 - 1.02 MG/DL    BUN/Creatinine ratio 10 (L) 12 - 20      GFR est AA >60 >60 ml/min/1.73m2    GFR est non-AA >60 >60 ml/min/1.73m2    Calcium 9.2 8.5 - 10.1 MG/DL    Bilirubin, total 1.2 (H) 0.2 - 1.0 MG/DL    ALT (SGPT) 33 12 - 78 U/L    AST (SGOT) 46 (H) 15 - 37 U/L    Alk. phosphatase 107 45 - 117 U/L    Protein, total 8.3 (H) 6.4 - 8.2 g/dL    Albumin 3.8 3.5 - 5.0 g/dL    Globulin 4.5 (H) 2.0 - 4.0 g/dL    A-G Ratio 0.8 (L) 1.1 - 2.2     MAGNESIUM    Collection Time: 07/30/20  3:13 PM   Result Value Ref Range    Magnesium 1.6 1.6 - 2.4 mg/dL       Radiologic Studies -   No orders to display     CT Results  (Last 48 hours)    None        CXR Results  (Last 48 hours)    None          Medical Decision Making   I am the first provider for this patient. I reviewed the vital signs, available nursing notes, past medical history, past surgical history, family history and social history. Vital Signs-Reviewed the patient's vital signs. Patient Vitals for the past 12 hrs:   Temp Pulse Resp BP SpO2   07/30/20 1323 98.1 °F (36.7 °C) 84 18 151/87 100 %       EKG interpretation: (Preliminary)  Rhythm: normal sinus rhythm; and regular . Rate (approx.): 83; Axis: normal; PA interval: normal; QRS interval: normal ; ST/T wave: non-specific changes; Other findings: unchanged from previous ekg. Records Reviewed: Nursing Notes, Old Medical Records, Previous electrocardiograms, Ambulance Run Sheet, Previous Radiology Studies and Previous Laboratory Studies    Provider Notes (Medical Decision Making):   Hypoglycemia, dehydration, electrolyte abnormality    ED Course:   Initial assessment performed.  The patients presenting problems have been discussed, and they are in agreement with the care plan formulated and outlined with them.  I have encouraged them to ask questions as they arise throughout their visit. Progress note: The patient is feeling better. Her results were reviewed. She is advised to follow-up and return to ER if worse       Critical Care Time:   0      Disposition:  home      DISCHARGE PLAN:  1. Discharge Medication List as of 7/30/2020  4:37 PM      CONTINUE these medications which have CHANGED    Details   potassium chloride SR (KLOR-CON 10) 10 mEq tablet Take 1 Tab by mouth daily. , Normal, Disp-7 Tab,R-0         CONTINUE these medications which have NOT CHANGED    Details   food supplemt, lactose-reduced (Ensure Original) liqd Take 237 mL by mouth three (3) times daily. , Normal, Disp-90 Can,R-3      Nebulizer Accessories kit Nebulizer Accessories Kit. Diagnosis J45.909 COPD J44.9, Print, Disp-1 Kit,R-3      vilazodone (VIIBRYD) 20 mg tab tablet Take 1 Tab by mouth daily. , Normal, Disp-30 Tab,R-5      albuterol-ipratropium (DUO-NEB) 2.5 mg-0.5 mg/3 ml nebu USE 1 VIAL VIA NEBULIZER EVERY 4 HOURS AS NEEDED, Print, Disp-1620 mL, R-1**Patient requests 90 days supply**      Nebulizer & Compressor machine Use as directed every 4 hours as needed for wheezing. Diagnosis: J45.909, J44.9.   Length of need: 99., Print, Disp-1 Each, R-0      atorvastatin (LIPITOR) 40 mg tablet Take 1 Tab by mouth nightly., Normal, Disp-30 Tab, R-12      ondansetron (Zofran ODT) 4 mg disintegrating tablet Take 1 Tab by mouth every six (6) hours as needed for Nausea., Normal, Disp-30 Tab, R-1      dextromethorphan-guaiFENesin (Mucinex DM) 60-1,200 mg Tb12 Take 1 Each by mouth two (2) times a day., Normal, Disp-30 Tab, R-1      Insulin Syringe-Needle U-100 (BD Insulin Syringe Ultra-Fine) 1 mL 31 gauge x 5/16 syrg USE AS DIRECTED TWICE A DAY, Normal, Disp-200 Syringe, R-0      cholecalciferol (VITAMIN D3) (50,000 UNITS /1250 MCG) capsule TAKE 1 CAP BY MOUTH EVERY THURSDAY., Normal, Disp-4 Cap, R-5      aspirin 81 mg chewable tablet TAKE 1 TABLET BY MOUTH EVERY DAY, Normal, Disp-90 Tab, R-2      insulin NPH (NOVOLIN N NPH U-100 INSULIN) 100 unit/mL injection INJECT 20 UNITS SUBCUTANEOUSLY TWICE A DAY, Normal, Disp-40 mL, R-2DX Code Needed  . ONETOUCH DELICA PLUS LANCET 33 gauge misc USE AS DIRECTED TWICE A DAY, Normal, Disp-100 Lancet, R-0      Blood-Glucose Meter monitoring kit One Touch Ultra Meter. Patient to test Blood Glucose twice per day. Patient use Insulin. Dx E11.9, Normal, Disp-1 Kit, R-0      !! glucose blood VI test strips (ASCENSIA AUTODISC VI, ONE TOUCH ULTRA TEST VI) strip Check bid as directed, Normal, Disp-100 Strip, R-3      Blood-Glucose Meter (ACCU-CHEK KRISTIE PLUS METER) misc by Does Not Apply route., NormalDiagnosis: E11.9Disp-1 Each, R-0      !! glucose blood VI test strips (ASCENSIA AUTODISC VI, ONE TOUCH ULTRA TEST VI) strip one touch ultra 2 strips. Use as directed to check blood sugar twice daily E11.9, Normal, Disp-100 Strip, R-11      nut.tx.gluc.intol,lac-free,soy (GLUCERNA SHAKE) liqd Take 1 Can by mouth two (2) times a day., Normal, Disp-60 Can, R-5      Lancing Device with Lancets (ACCU-CHEK SOFT DEV LANCETS) kit by Does Not Apply route., NormalDiagnosis Diagnosis E11.9Disp-1 Kit, R-11      !! glucose blood VI test strips (ACCU-CHEK KRISTIE PLUS TEST STRP) strip by Does Not Apply route See Admin Instructions. , Normal, Disp-100 Strip, R-3      metoprolol tartrate (LOPRESSOR) 25 mg tablet Take 1 Tab by mouth two (2) times a day., Normal, Disp-180 Tab, R-3      gabapentin (NEURONTIN) 300 mg capsule Take 1 Cap by mouth three (3) times daily. , Normal, Disp-30 Cap, R-0      ticagrelor (BRILINTA) 90 mg tablet Take 1 Tab by mouth every twelve (12) hours every twelve (12) hours. , Print, Disp-60 Tab, R-12      albuterol (PROAIR HFA) 90 mcg/actuation inhaler Take 2 Puffs by inhalation every four (4) hours as needed for Shortness of Breath. Usually uses at least once daily. , Normal, Disp-1 Inhaler, R-11      oxyCODONE (OXYIR) 5 mg capsule Take 1 Cap by mouth every four (4) hours as needed. Max Daily Amount: 30 mg., Print, Disp-20 Cap, R-0      budesonide-formoterol (SYMBICORT) 80-4.5 mcg/actuation HFAA Take 2 Puffs by inhalation two (2) times a day., Normal, Disp-1 Inhaler, R-11      metFORMIN (GLUCOPHAGE) 1,000 mg tablet Take 1,000 mg by mouth two (2) times daily (with meals). Patient unsure of dose or frequency - Rx Query states filled 2/18 #60 for 30 day supply, Historical Med       !! - Potential duplicate medications found. Please discuss with provider. 2.   Follow-up Information     Follow up With Specialties Details Why Contact Info    Christopher Hagen MD Internal Medicine In 4 days  [address] [facility]  [phone] [address] [facility] EMERGENCY DEPT Emergency Medicine  If symptoms worsen [address]  [address] [facility]  [phone]        3. Return to ED if worse     Diagnosis     Clinical Impression:   1. Hypoglycemia    2. Hypokalemia        Attestations:    Maico Sparks MD    Please note that this dictation was completed with [email or web address], the computer voice recognition software. Quite often unanticipated grammatical, syntax, homophones, and other interpretive errors are inadvertently transcribed by the computer software. Please disregard these errors. Please excuse any errors that have escaped final proofreading. Thank you.

## 2020-07-30 NOTE — ED NOTES
Spoke with [facility] regarding them locking patient's apt- they state that the apt manager and maintence locked her apt- upon her arrival they will let her in- patient's son not intown

## 2020-07-30 NOTE — ED NOTES
Assumed care of patient. Patient arrives via ems for ams. EMS reports patient's neighbors heard her yelling which is why they called ems. Upon ems arrival, patient was lying on the floor. Blood glucose 47. D10 administered by ems with repeat bs 115. Patient reported that she took her insulin but didn't eat. Upon arrival, patient alert and oriented. GCS 15, NIH 0. BS 98. Per Dr. Nikhil Higgins, okay for patient to eat. Dysphagia screening performed and passed. Patient placed on cardiac, bp, and pulse ox monitoring. NAD. Food provided to patient. Will continue to monitor. 1730 - Patient reports missing shoes. Patient noted to be without shoes upon arrival.     1752 - Discharge instructions provided to patient. Verbalized understanding. Alert and oriented. IV locks removed. Escorted out of ED via w/c to wait for ride.

## 2020-07-31 ENCOUNTER — PATIENT OUTREACH (OUTPATIENT)
Dept: CASE MANAGEMENT | Age: 70
End: 2020-07-31

## 2020-07-31 LAB
ATRIAL RATE: 83 BPM
CALCULATED P AXIS, ECG09: 58 DEGREES
CALCULATED R AXIS, ECG10: 63 DEGREES
CALCULATED T AXIS, ECG11: 56 DEGREES
DIAGNOSIS, 93000: NORMAL
P-R INTERVAL, ECG05: 188 MS
Q-T INTERVAL, ECG07: 422 MS
QRS DURATION, ECG06: 86 MS
QTC CALCULATION (BEZET), ECG08: 495 MS
VENTRICULAR RATE, ECG03: 83 BPM

## 2020-08-03 NOTE — PROGRESS NOTES
08/03/20 Attempted patient outreach for COVID follow up call per protocol. Unable to contact patient, not active on MyChart. Resolving PORTILLO episode.  GABBY

## 2020-10-17 DIAGNOSIS — J45.909 ASTHMA, UNSPECIFIED ASTHMA SEVERITY, UNSPECIFIED WHETHER COMPLICATED, UNSPECIFIED WHETHER PERSISTENT: ICD-10-CM

## 2020-10-17 DIAGNOSIS — J44.9 CHRONIC OBSTRUCTIVE PULMONARY DISEASE, UNSPECIFIED COPD TYPE (HCC): ICD-10-CM

## 2020-10-19 RX ORDER — IPRATROPIUM BROMIDE AND ALBUTEROL SULFATE 2.5; .5 MG/3ML; MG/3ML
SOLUTION RESPIRATORY (INHALATION)
Qty: 1620 ML | Refills: 1 | Status: SHIPPED | OUTPATIENT
Start: 2020-10-19

## 2020-11-03 ENCOUNTER — TELEPHONE (OUTPATIENT)
Dept: INTERNAL MEDICINE CLINIC | Age: 70
End: 2020-11-03

## 2020-11-03 DIAGNOSIS — E55.9 VITAMIN D DEFICIENCY: Primary | ICD-10-CM

## 2020-11-03 NOTE — TELEPHONE ENCOUNTER
----- Message from Allyson Rivas sent at 11/3/2020  3:42 PM EST -----  Regarding: Dr. Mee Castellanos  General Message/Vendor Calls    Caller's first and last name: [facility]      Reason for call: medication clarification      Callback required yes/no and why: yes, clarify Vitamin D script      Best contact number(s): [phone]      Details to clarify the request: [facility] requesting a call back from office to discuss pt's script for Vitamin D.      Message from [employer]

## 2020-11-04 RX ORDER — ASPIRIN 325 MG
50000 TABLET, DELAYED RELEASE (ENTERIC COATED) ORAL
Qty: 12 CAP | Refills: 1 | Status: CANCELLED | OUTPATIENT
Start: 2020-11-05

## 2020-11-05 RX ORDER — ERGOCALCIFEROL 1.25 MG/1
50000 CAPSULE ORAL
Qty: 4 CAP | Refills: 11 | Status: SHIPPED | OUTPATIENT
Start: 2020-11-05

## 2021-11-18 ENCOUNTER — TELEPHONE (OUTPATIENT)
Dept: INTERNAL MEDICINE CLINIC | Age: 71
End: 2021-11-18

## 2021-11-18 NOTE — TELEPHONE ENCOUNTER
Pt is scheduled for next available appt. Pt is on cancellation list if sooner pops up.  For now, I have noting I can do as Dr. Isabella Stack has noting sooner

## 2021-11-18 NOTE — TELEPHONE ENCOUNTER
#[phone]  Pt states she has many things going on with her that she and her family are concerned about. Pt did not go into any detail. Pt wants a call back as soon as possible to get an appt. Pt is scheduled for 12-6-21 I advised there are no sooner appts. Pt states she needs to be seen and talk with Dr. Kristin Fuentes to let him know what is going on. Again, pt would not give any reason to be seen. Please call to see how you can assist pt.

## 2021-11-22 NOTE — TELEPHONE ENCOUNTER
Patient called again, transferred from [address] [place] [address],[address]. States that she has swelling and numbness and tingling in legs and fingers. Pt requested an appt. No appts available. This psr referred pt to urgent care for more  immediate treatment and pt provided with the contact info for [facility]. Pt states will go to urgent care.       Please be advised

## 2021-11-23 ENCOUNTER — NURSE TRIAGE (OUTPATIENT)
Dept: OTHER | Facility: CLINIC | Age: 71
End: 2021-11-23

## 2021-11-23 ENCOUNTER — TELEPHONE (OUTPATIENT)
Dept: INTERNAL MEDICINE CLINIC | Age: 71
End: 2021-11-23

## 2021-11-23 NOTE — TELEPHONE ENCOUNTER
Identified patient 2 identifiers verified. Patient has numbness in both feet for a month now and not feeling well. Patient reports being depressed, no energy. Patient was placed on wait list for sooner appointment. Patient reports that she lives alone in a [employer] [place] and is concerned  about numbness and increased anxiety. Patient is living in an apartment that is being work on, she has no ceiling in her living room and bedroom and little heat. Patient reports taking all of her medications. Patient requesting order for labs.

## 2021-11-23 NOTE — TELEPHONE ENCOUNTER
Patient needs a call back in reference to getting an appt by this Friday from call with Nurse Triage Line. Please call patient @ [phone].  Thank you

## 2021-11-23 NOTE — TELEPHONE ENCOUNTER
Received call from Raulito at [facility] with [employer]. Brief description of triage:   Pt reports numbness in her feet, decreased appetite and fatigue. Pt would like to be seen. Care advice provided, patient verbalizes understanding; denies any other questions or concerns; instructed to call back for any new or worsening symptoms. Writer provided warm transfer to [employer] at [facility] for appointment scheduling. Attention Provider: Thank you for allowing me to participate in the care of your patient. The patient was connected to triage in response to information provided to the [facility]. Please do not respond through this encounter as the response is not directed to a shared pool. Reason for Disposition   Patient wants to be seen    Answer Assessment - Initial Assessment Questions  1. SYMPTOM: \"What is the main symptom you are concerned about? \" (e.g., weakness, numbness)      Pt reports numbness in her both feet    2. ONSET: \"When did this start? \" (minutes, hours, days; while sleeping)      3 months ago    3. LAST NORMAL: \"When was the last time you were normal (no symptoms)? \"      3 months ago    4. PATTERN \"Does this come and go, or has it been constant since it started? \"  \"Is it present now? \"      Constant     5. CARDIAC SYMPTOMS: \"Have you had any of the following symptoms: chest pain, difficulty breathing, palpitations? \"      Denies     6. NEUROLOGIC SYMPTOMS: \"Have you had any of the following symptoms: headache, dizziness, vision loss, double vision, changes in speech, unsteady on your feet? \"      She does report unsteadiness on her feet    7. OTHER SYMPTOMS: \"Do you have any other symptoms? \"      Loss of appetite, weight loss and fatigue    8. PREGNANCY: \"Is there any chance you are pregnant? \" \"When was your last menstrual period? \"      N/a    Protocols used: NEUROLOGIC DEFICIT-ADULT-[place]

## 2022-03-07 ENCOUNTER — HOSPITAL ENCOUNTER (EMERGENCY)
Age: 72
Discharge: HOME | End: 2022-03-07
Attending: EMERGENCY MEDICINE
Payer: MEDICARE

## 2022-03-07 ENCOUNTER — APPOINTMENT (OUTPATIENT)
Dept: VASCULAR SURGERY | Age: 72
End: 2022-03-07
Attending: EMERGENCY MEDICINE
Payer: MEDICARE

## 2022-03-07 VITALS
BODY MASS INDEX: 27.49 KG/M2 | HEIGHT: 65 IN | SYSTOLIC BLOOD PRESSURE: 121 MMHG | DIASTOLIC BLOOD PRESSURE: 84 MMHG | OXYGEN SATURATION: 100 % | HEART RATE: 102 BPM | RESPIRATION RATE: 18 BRPM | TEMPERATURE: 99.3 F | WEIGHT: 165 LBS

## 2022-03-07 DIAGNOSIS — R60.1 GENERALIZED EDEMA: Primary | ICD-10-CM

## 2022-03-07 DIAGNOSIS — R20.2 PARESTHESIA OF FOOT, BILATERAL: ICD-10-CM

## 2022-03-07 DIAGNOSIS — M17.11 PRIMARY OSTEOARTHRITIS OF RIGHT KNEE: ICD-10-CM

## 2022-03-07 LAB
ALBUMIN SERPL-MCNC: 2.5 G/DL (ref 3.5–5)
ALBUMIN/GLOB SERPL: 0.5 {RATIO} (ref 1.1–2.2)
ALP SERPL-CCNC: 90 U/L (ref 45–117)
ALT SERPL-CCNC: 10 U/L (ref 12–78)
ANION GAP SERPL CALC-SCNC: 12 MMOL/L (ref 5–15)
AST SERPL-CCNC: 18 U/L (ref 15–37)
BASOPHILS # BLD: 0 K/UL (ref 0–0.1)
BASOPHILS NFR BLD: 0 % (ref 0–1)
BILIRUB SERPL-MCNC: 2.1 MG/DL (ref 0.2–1)
BNP SERPL-MCNC: 427 PG/ML (ref 0–125)
BUN SERPL-MCNC: 14 MG/DL (ref 6–20)
BUN/CREAT SERPL: 14 (ref 12–20)
CALCIUM SERPL-MCNC: 9.1 MG/DL (ref 8.5–10.1)
CHLORIDE SERPL-SCNC: 97 MMOL/L (ref 97–108)
CO2 SERPL-SCNC: 25 MMOL/L (ref 21–32)
CREAT SERPL-MCNC: 1.02 MG/DL (ref 0.55–1.02)
DIFFERENTIAL METHOD BLD: ABNORMAL
EOSINOPHIL # BLD: 0 K/UL (ref 0–0.4)
EOSINOPHIL NFR BLD: 0 % (ref 0–7)
ERYTHROCYTE [DISTWIDTH] IN BLOOD BY AUTOMATED COUNT: 11.8 % (ref 11.5–14.5)
GLOBULIN SER CALC-MCNC: 4.7 G/DL (ref 2–4)
GLUCOSE BLD STRIP.AUTO-MCNC: 136 MG/DL (ref 65–117)
GLUCOSE SERPL-MCNC: 130 MG/DL (ref 65–100)
HCT VFR BLD AUTO: 41.3 % (ref 35–47)
HGB BLD-MCNC: 14 G/DL (ref 11.5–16)
IMM GRANULOCYTES # BLD AUTO: 0 K/UL (ref 0–0.04)
IMM GRANULOCYTES NFR BLD AUTO: 0 % (ref 0–0.5)
LYMPHOCYTES # BLD: 1 K/UL (ref 0.8–3.5)
LYMPHOCYTES NFR BLD: 11 % (ref 12–49)
MAGNESIUM SERPL-MCNC: 2 MG/DL (ref 1.6–2.4)
MCH RBC QN AUTO: 32.4 PG (ref 26–34)
MCHC RBC AUTO-ENTMCNC: 33.9 G/DL (ref 30–36.5)
MCV RBC AUTO: 95.6 FL (ref 80–99)
MONOCYTES # BLD: 1.1 K/UL (ref 0–1)
MONOCYTES NFR BLD: 12 % (ref 5–13)
NEUTS SEG # BLD: 6.8 K/UL (ref 1.8–8)
NEUTS SEG NFR BLD: 77 % (ref 32–75)
NRBC # BLD: 0 K/UL (ref 0–0.01)
NRBC BLD-RTO: 0 PER 100 WBC
PLATELET # BLD AUTO: 217 K/UL (ref 150–400)
PMV BLD AUTO: 10.5 FL (ref 8.9–12.9)
POTASSIUM SERPL-SCNC: 3.9 MMOL/L (ref 3.5–5.1)
PROT SERPL-MCNC: 7.2 G/DL (ref 6.4–8.2)
RBC # BLD AUTO: 4.32 M/UL (ref 3.8–5.2)
SERVICE CMNT-IMP: ABNORMAL
SODIUM SERPL-SCNC: 134 MMOL/L (ref 136–145)
WBC # BLD AUTO: 8.9 K/UL (ref 3.6–11)

## 2022-03-07 PROCEDURE — 83735 ASSAY OF MAGNESIUM: CPT

## 2022-03-07 PROCEDURE — 80053 COMPREHEN METABOLIC PANEL: CPT

## 2022-03-07 PROCEDURE — 82962 GLUCOSE BLOOD TEST: CPT

## 2022-03-07 PROCEDURE — 36415 COLL VENOUS BLD VENIPUNCTURE: CPT

## 2022-03-07 PROCEDURE — 83880 ASSAY OF NATRIURETIC PEPTIDE: CPT

## 2022-03-07 PROCEDURE — 93971 EXTREMITY STUDY: CPT | Performed by: INTERNAL MEDICINE

## 2022-03-07 PROCEDURE — 93971 EXTREMITY STUDY: CPT

## 2022-03-07 PROCEDURE — 99284 EMERGENCY DEPT VISIT MOD MDM: CPT

## 2022-03-07 PROCEDURE — 85025 COMPLETE CBC W/AUTO DIFF WBC: CPT

## 2022-03-07 NOTE — ED PROVIDER NOTES
EMERGENCY DEPARTMENT HISTORY AND PHYSICAL EXAM      Date: 3/7/2022  Patient Name: Carrie Clemons    History of Presenting Illness     Chief Complaint   Patient presents with    Leg Pain     History Provided By: Patient and Patient's Daughter    HPI: Carrie Clemosn, 73 y.o. female with past medical history significant for anxiety, asthma, COPD, diabetes, GERD, hypertension, and rheumatoid arthritis who presents via private vehicle accompanied by her daughter to the ED with cc of bilateral feet numbness, ankle swelling, and right knee pain. Patient states the swelling has been intermittent in nature but progressively worsening over the past 2 weeks. She reports that the right foot and ankle seems to swell more than the left. She denies any recent falls, trauma, or injury. As for the feet numbness, she initially described it as a pain with ambulating but now states it is more of a numbness. Her daughter reports that it bothers her so much sometimes that she does not want to go to the doctors or emergency department. She does have a history of diabetes and neuropathy which she believes may be contributing to her symptoms. She was taken off her diabetes medications when she lost a significant amount of weight a year ago and has not checked her sugar since. Finally, she complains of a throbbing/aching pain to the medial aspect of her right knee that is worse with weightbearing. She denies any radiation of the pain. She was followed by a pain management doctor, but her daughter does not like that particular provider so she stopped going. PMHx: Anxiety, asthma, COPD, diabetes, GERD, hypertension, and rheumatoid arthritis  Social Hx: Smokes 1/4 pack/day, occasional alcohol use, denies illegal drug use    PCP: Katie Halsted, MD    There are no other complaints, changes, or physical findings at this time. No current facility-administered medications on file prior to encounter.      Current Outpatient Medications on File Prior to Encounter   Medication Sig Dispense Refill    ergocalciferol (ERGOCALCIFEROL) 1,250 mcg (50,000 unit) capsule Take 1 Cap by mouth every seven (7) days. 4 Cap 11    OneTouch Ultra Blue Test Strip strip CHECK BLOOD GLUCOSE TWICE A DAY AS DIRECTED 100 Strip 3    albuterol-ipratropium (DUO-NEB) 2.5 mg-0.5 mg/3 ml nebu INHALE 1 VIAL VIA NEBULIZER EVERY 4 HOURS AS NEEDED 1620 mL 1    potassium chloride SR (KLOR-CON 10) 10 mEq tablet Take 1 Tab by mouth daily. 7 Tab 0    food supplemt, lactose-reduced (Ensure Original) liqd Take 237 mL by mouth three (3) times daily. 90 Can 3    Nebulizer Accessories kit Nebulizer Accessories Kit. Diagnosis J45.909 COPD J44.9 1 Kit 3    vilazodone (VIIBRYD) 20 mg tab tablet Take 1 Tab by mouth daily. 30 Tab 5    Nebulizer & Compressor machine Use as directed every 4 hours as needed for wheezing. Diagnosis: J45.909, J44.9. Length of need: 99. 1 Each 0    atorvastatin (LIPITOR) 40 mg tablet Take 1 Tab by mouth nightly. 30 Tab 12    ondansetron (Zofran ODT) 4 mg disintegrating tablet Take 1 Tab by mouth every six (6) hours as needed for Nausea. 30 Tab 1    dextromethorphan-guaiFENesin (Mucinex DM) 60-1,200 mg Tb12 Take 1 Each by mouth two (2) times a day. 30 Tab 1    Insulin Syringe-Needle U-100 (BD Insulin Syringe Ultra-Fine) 1 mL 31 gauge x 5/16 syrg USE AS DIRECTED TWICE A  Syringe 0    aspirin 81 mg chewable tablet TAKE 1 TABLET BY MOUTH EVERY DAY 90 Tab 2    insulin NPH (NOVOLIN N NPH U-100 INSULIN) 100 unit/mL injection INJECT 20 UNITS SUBCUTANEOUSLY TWICE A DAY 40 mL 2    ONETOUCH DELICA PLUS LANCET 33 gauge misc USE AS DIRECTED TWICE A  Lancet 0    Blood-Glucose Meter monitoring kit One Touch Ultra Meter. Patient to test Blood Glucose twice per day. Patient use Insulin. Dx E11.9 1 Kit 0    Blood-Glucose Meter (ACCU-CHEK KRISTIE PLUS METER) misc by Does Not Apply route.  1 Each 0    glucose blood VI test strips (ASCENSIA AUTODISC VI, ONE TOUCH ULTRA TEST VI) strip one touch ultra 2 strips. Use as directed to check blood sugar twice daily E11.9 100 Strip 11    nut.tx.gluc.intol,lac-free,soy (GLUCERNA SHAKE) liqd Take 1 Can by mouth two (2) times a day. 60 Can 5    Lancing Device with Lancets (ACCU-CHEK SOFT DEV LANCETS) kit by Does Not Apply route. 1 Kit 11    glucose blood VI test strips (ACCU-CHEK KRISTIE PLUS TEST STRP) strip by Does Not Apply route See Admin Instructions. 100 Strip 3    metoprolol tartrate (LOPRESSOR) 25 mg tablet Take 1 Tab by mouth two (2) times a day. 180 Tab 3    gabapentin (NEURONTIN) 300 mg capsule Take 1 Cap by mouth three (3) times daily. 30 Cap 0    ticagrelor (BRILINTA) 90 mg tablet Take 1 Tab by mouth every twelve (12) hours every twelve (12) hours. 60 Tab 12    albuterol (PROAIR HFA) 90 mcg/actuation inhaler Take 2 Puffs by inhalation every four (4) hours as needed for Shortness of Breath. Usually uses at least once daily. 1 Inhaler 11    oxyCODONE (OXYIR) 5 mg capsule Take 1 Cap by mouth every four (4) hours as needed. Max Daily Amount: 30 mg. 20 Cap 0    budesonide-formoterol (SYMBICORT) 80-4.5 mcg/actuation HFAA Take 2 Puffs by inhalation two (2) times a day. 1 Inhaler 11    metFORMIN (GLUCOPHAGE) 1,000 mg tablet Take 1,000 mg by mouth two (2) times daily (with meals).  Patient unsure of dose or frequency - Rx Query states filled 2/18 #60 for 30 day supply       Past History     Past Medical History:  Past Medical History:   Diagnosis Date    Anxiety     Asthma     COPD     Diabetes ([address]     Dyspepsia and other specified disorders of function of stomach     Hemorrhoids     Hypertension     Rheumatoid arthritis ([facility] [address] 5/8/2018     Past Surgical History:  Past Surgical History:   Procedure Laterality Date    HX BREAST BIOPSY Left     benign    HX GYN      Thermal Ablation    HX HEART CATHETERIZATION      exploration    HX LAPAROTOMY      HX ORTHOPAEDIC      right knee    HX OTHER SURGICAL      Colonoscopy    HX TUBAL LIGATION       Family History:  Family History   Problem Relation Age of Onset    Heart Disease Father     Cancer Sister      Social History:  Social History     Tobacco Use    Smoking status: Current Some Day Smoker     Packs/day: 0.25     Years: 38.00     Pack years: 9.50    Smokeless tobacco: Never Used   Substance Use Topics    Alcohol use: Yes     Alcohol/week: 1.7 standard drinks     Types: 2 Glasses of wine per week     Comment: occasionally    Drug use: No     Allergies: Allergies   Allergen Reactions    Buspirone Other (comments)     jittery    Codeine Itching    Cymbalta [Duloxetine] Other (comments)     Didn't like how it made her feel    Mirtazapine Drowsiness     She has fallen when trying to get up in the morning due to grogginess    Trazodone Other (comments)     Didn't like how it made her feel      Tylenol-Codeine #3 [Acetaminophen-Codeine] Itching     Patient states she cannot tolerate APAP    Wellbutrin [Bupropion Hcl] Other (comments)     Insomnia    Zoloft [Sertraline] Other (comments)     \"Felt crazy\"     Review of Systems   Review of Systems   Constitutional: Negative for chills and fever. HENT: Negative for congestion, rhinorrhea, sneezing and sore throat. Eyes: Negative for redness and visual disturbance. Respiratory: Negative for shortness of breath. Cardiovascular: Positive for leg swelling. Gastrointestinal: Negative for abdominal pain, nausea and vomiting. Genitourinary: Negative for difficulty urinating and frequency. Musculoskeletal: Positive for arthralgias. Negative for back pain, myalgias and neck stiffness. Skin: Negative for rash. Neurological: Positive for numbness. Negative for dizziness, syncope, weakness and headaches. Hematological: Negative for adenopathy. All other systems reviewed and are negative. Physical Exam   Physical Exam  Vitals and nursing note reviewed.    Constitutional: Appearance: Normal appearance. She is well-developed. HENT:      Head: Normocephalic and atraumatic. Eyes:      Conjunctiva/sclera: Conjunctivae normal.   Cardiovascular:      Rate and Rhythm: Regular rhythm. Tachycardia present. Pulses: Normal pulses. Heart sounds: Normal heart sounds, S1 normal and S2 normal.      Comments: Feet are warm to the touch bilaterally  Pulmonary:      Effort: Pulmonary effort is normal. No respiratory distress. Breath sounds: Normal breath sounds. No wheezing. Abdominal:      General: Bowel sounds are normal. There is no distension. Palpations: Abdomen is soft. Tenderness: There is no abdominal tenderness. There is no rebound. Musculoskeletal:         General: Normal range of motion. Cervical back: Full passive range of motion without pain, normal range of motion and neck supple. Right knee: Bony tenderness present. Right lower le+ Pitting Edema present. Left lower le+ Pitting Edema present. Legs:    Skin:     General: Skin is warm and dry. Findings: No rash. Neurological:      Mental Status: She is alert and oriented to person, place, and time. Psychiatric:         Speech: Speech normal.         Behavior: Behavior normal.         Thought Content:  Thought content normal.         Judgment: Judgment normal.       Diagnostic Study Results   Labs -     Recent Results (from the past 12 hour(s))   GLUCOSE, POC    Collection Time: 22  2:43 PM   Result Value Ref Range    Glucose (POC) 136 (H) 65 - 117 mg/dL    Performed by Catarina Weir RN    CBC WITH AUTOMATED DIFF    Collection Time: 22  2:56 PM   Result Value Ref Range    WBC 8.9 3.6 - 11.0 K/uL    RBC 4.32 3.80 - 5.20 M/uL    HGB 14.0 11.5 - 16.0 g/dL    HCT 41.3 35.0 - 47.0 %    MCV 95.6 80.0 - 99.0 FL    MCH 32.4 26.0 - 34.0 PG    MCHC 33.9 30.0 - 36.5 g/dL    RDW 11.8 11.5 - 14.5 %    PLATELET 750 689 - 467 K/uL    MPV 10.5 8.9 - 12.9 FL NRBC 0.0 0  WBC    ABSOLUTE NRBC 0.00 0.00 - 0.01 K/uL    NEUTROPHILS 77 (H) 32 - 75 %    LYMPHOCYTES 11 (L) 12 - 49 %    MONOCYTES 12 5 - 13 %    EOSINOPHILS 0 0 - 7 %    BASOPHILS 0 0 - 1 %    IMMATURE GRANULOCYTES 0 0.0 - 0.5 %    ABS. NEUTROPHILS 6.8 1.8 - 8.0 K/UL    ABS. LYMPHOCYTES 1.0 0.8 - 3.5 K/UL    ABS. MONOCYTES 1.1 (H) 0.0 - 1.0 K/UL    ABS. EOSINOPHILS 0.0 0.0 - 0.4 K/UL    ABS. BASOPHILS 0.0 0.0 - 0.1 K/UL    ABS. IMM. GRANS. 0.0 0.00 - 0.04 K/UL    DF AUTOMATED         Radiologic Studies -   DUPLEX LOWER EXT VENOUS RIGHT   Final Result        No results found. Medical Decision Making   I am the first provider for this patient. I reviewed the vital signs, available nursing notes, past medical history, past surgical history, family history and social history. Vital Signs-Reviewed the patient's vital signs. Patient Vitals for the past 24 hrs:   Temp Pulse Resp BP SpO2   03/07/22 1415 99.3 °F (37.4 °C) (!) 102 18 121/84 100 %     Pulse Oximetry Analysis - 100% on RA (normal)    Records Reviewed: Nursing Notes, Old Medical Records, Previous Radiology Studies and Previous Laboratory Studies    Provider Notes (Medical Decision Making):   72-year-old female presents with multiple vague complaints including bilateral lower extremity numbness, bilateral feet swelling, and right knee pain. She has a history of neuropathy secondary to diabetes which I suspect is the cause for her feet numbness. She does have 2+ pitting edema of the right lower extremity but denies any calf pain. Will obtain a venous duplex of that right lower leg, check labs to rule out electrolyte abnormalities as well as cardiac and renal function, and reassess. ED Course:   Initial assessment performed. The patients presenting problems have been discussed, and they are in agreement with the care plan formulated and outlined with them.   I have encouraged them to ask questions as they arise throughout their visit.    BEDSIDE SIGN OUT:  3:10 PM  Discussed pt's hx, disposition, and available diagnostic and imaging results with Dr. Adrien Camargo. Reviewed care plans. Both providers and patient are in agreement with care plan. Neena Salvador MD is transferring care at this time. Venous duplex and labs are unremarkable with the exception of a slightly elevated proBNP. Will discharge patient with primary care follow-up. Discussed plan of care with patient and her daughter who agree. Progress Note:   Updated pt on all returned results and findings. Discussed the importance of proper follow up as referred below along with return precautions. Pt in agreement with the care plan and expresses agreement with and understanding of all items discussed. Disposition:  Discharge Note:  The pt is ready for discharge. The pt's signs, symptoms, diagnosis, and discharge instructions have been discussed and pt has conveyed their understanding. The pt is to follow up as recommended or return to ER should their symptoms worsen. Plan has been discussed and pt is in agreement. PLAN:  1. Discharge Medication List as of 3/7/2022  5:12 PM        2. Follow-up Information     Follow up With Specialties Details Why Contact Info    Doug Dunn MD Internal Medicine Schedule an appointment as soon as possible for a visit   [address] [facility] [place] [address] [facility]      Adriane Cameron, [address] [place] [address] Internal Medicine Schedule an appointment as soon as possible for a visit   [address] [facility]  [address] [facility] [address]  [phone]      [facility] EMERGENCY DEPT Emergency Medicine  As needed, If symptoms worsen [facility]  [phone]        Return to ED if worse     Diagnosis     Clinical Impression:   1. Generalized edema    2. Paresthesia of foot, bilateral    3.  Primary osteoarthritis of right knee            Please note that this dictation was completed with Dragon, computer voice recognition software. Quite often unanticipated grammatical, syntax, homophones, and other interpretive errors are inadvertently transcribed by the computer software. Please disregard these errors. Additionally, please excuse any errors that have escaped final proofreading.

## 2022-03-07 NOTE — ED NOTES
Emergency Department Nursing Plan of Care       The Nursing Plan of Care is developed from the Nursing assessment and Emergency Department Attending provider initial evaluation. The plan of care may be reviewed in the ED Provider note.     The Plan of Care was developed with the following considerations:   Patient / Family readiness to learn indicated by:verbalized understanding and successful return demonstration  Persons(s) to be included in education: patient  Barriers to Learning/Limitations:No    Signed     Ingris Wild RN    3/7/2022   2:26 PM

## 2022-03-07 NOTE — ED TRIAGE NOTES
C\o bi lateral knee pain x2 weeks pt also reports numbness in her feet for 2 weeks, states she is a diabetic that has not been dx with neuropathy. Pt states she is able to ambulate without assistance.

## 2022-03-07 NOTE — ED NOTES
Patient (s)  given copy of dc instructions and 0 paper script(s) and 0 electronic scripts. Patient (s)  verbalized understanding of instructions and script (s). Patient given a current medication reconciliation form and verbalized understanding of their medications. Patient ( verbalized understanding of the importance of discussing medications with  his or her physician or clinic they will be following up with. Patient alert and oriented and in no acute distress. Patient offered wheelchair from treatment area to hospital entrance, patient denies wheelchair.

## 2022-03-08 ENCOUNTER — NURSE TRIAGE (OUTPATIENT)
Dept: OTHER | Facility: CLINIC | Age: 72
End: 2022-03-08

## 2022-03-08 NOTE — TELEPHONE ENCOUNTER
Pt transferred from [facility] in [facility]. Pt was in ED yesterday for numbness and swelling in both feet. Advised to f/u with PCP. Sx have not worsened or changed. Advised to call back if develops any new sx or worsening sx. Warm transfer to [place] in [facility] to further assist with appt scheduling.      Reason for Disposition   Requesting regular office appointment    Protocols used: INFORMATION ONLY CALL - NO TRIAGE-ADULT-

## 2022-03-09 ENCOUNTER — TELEPHONE (OUTPATIENT)
Dept: INTERNAL MEDICINE CLINIC | Age: 72
End: 2022-03-09

## 2022-03-09 NOTE — TELEPHONE ENCOUNTER
----- Message from [employer] sent at 3/8/2022  4:44 PM EST -----  Subject: Message to Provider    QUESTIONS  Information for Provider? Patient was returning a call from the office. please call patient  ---------------------------------------------------------------------------  --------------  CALL BACK INFO  What is the best way for the office to contact you? OK to leave message on   voicemail  Preferred Call Back Phone Number?  [phone]  ---------------------------------------------------------------------------  --------------  SCRIPT ANSWERS  undefined

## 2022-03-09 NOTE — TELEPHONE ENCOUNTER
Do not see where any one from the office called the patient. Appears that patient spoke with triage nurse and was scheduled to see Dr. Marcia Perez on 03/10/2022.   Yamilex Griffin LPN

## 2022-03-18 PROBLEM — I20.0 UNSTABLE ANGINA (HCC): Status: ACTIVE | Noted: 2019-03-14

## 2022-03-18 PROBLEM — K92.2 GI BLEED: Status: ACTIVE | Noted: 2017-03-06

## 2022-03-19 PROBLEM — Z79.4 TYPE 2 DIABETES MELLITUS WITHOUT COMPLICATION, WITH LONG-TERM CURRENT USE OF INSULIN (HCC): Status: ACTIVE | Noted: 2018-06-29

## 2022-03-19 PROBLEM — E11.9 TYPE 2 DIABETES MELLITUS WITHOUT COMPLICATION, WITH LONG-TERM CURRENT USE OF INSULIN (HCC): Status: ACTIVE | Noted: 2018-06-29

## 2022-03-19 PROBLEM — M06.9 RHEUMATOID ARTHRITIS (HCC): Status: ACTIVE | Noted: 2018-05-08

## 2025-06-24 NOTE — TELEPHONE ENCOUNTER
----- Message from Pradeep Terry LPN sent at 1/93/4510 10:39 AM EDT -----  Regarding: SN nurse  Good morning Dr. Kenya Vyas and Annette Kumari,    I spoke with Mrs. Ricardo Vickers yesterday I was trying to schedule a nurse to come and see pt. However, per patient her sister is not doing well has 3 surgeries in [place] and she is going to be with her sister. Per patient the wound has a scab on it but still a little red around it. Per patient she will let us know when we can come and admit her into homecare. If she will call back and still need home I will let you know.     Thanks  [facility] [Negative] : Heme/Lymph

## (undated) DEVICE — CATH GUID .056IN 6FR 150CM -- GUIDELINER V3

## (undated) DEVICE — CATH GUID COR EB35 7FR 100CM -- LAUNCHER

## (undated) DEVICE — HI-TORQUE VERSACORE FLOPPY GUIDE WIRE SYSTEM 145 CM: Brand: HI-TORQUE VERSACORE

## (undated) DEVICE — CATHETER BLLN SPRINTER LEGEND RX 142CM L12MM DIA2.5MM GWIRE 0.022IN 8ATM

## (undated) DEVICE — CUSTOM KT PTCA INFL DEV K05 00053H

## (undated) DEVICE — GUIDEWIRE VASC L145CM 0.035IN J TIP L3MM PTFE FIX COR NAMIC

## (undated) DEVICE — SYR POWER 150ML 8IN FILL TUBE --

## (undated) DEVICE — NC TREK CORONARY DILATATION CATHETER 2.5 MM X 12 MM / RAPID-EXCHANGE: Brand: NC TREK

## (undated) DEVICE — ANGIO-SEAL VIP VASCULAR CLOSURE DEVICE: Brand: ANGIO-SEAL

## (undated) DEVICE — Device: Brand: PROWATER

## (undated) DEVICE — CATHETER ETER CARD MULTIPAK MULTIPAK 5FR PERFORMA

## (undated) DEVICE — PINNACLE INTRODUCER SHEATH: Brand: PINNACLE

## (undated) DEVICE — DRESSING HEMOSTATIC SFT INTVENT W/O SLT DBL WRP QUIKCLOT LF

## (undated) DEVICE — PACK PROCEDURE SURG HRT CATH

## (undated) DEVICE — CATHETER BLLN SPRINTER OTW 138CM 12MM DIA2.5MM CROSSING PROF

## (undated) DEVICE — CATHETER BLLN  SPRINTER 138CM 12MM DIA1.5MM CROSSING PROF

## (undated) DEVICE — ANGIOGRAPHY KIT CUST [K0910930B] [MERIT MEDICAL SYSTEMS INC]

## (undated) DEVICE — TOWEL,OR,DSP,ST,BLUE,STD,2/PK,40PK/CS: Brand: MEDLINE

## (undated) DEVICE — HI-TORQUE WIGGLE GUIDE WIRE .014 STRAIGHT TIP 2.0 CM X 300 CM: Brand: HI-TORQUE WIGGLE